# Patient Record
Sex: FEMALE | Race: WHITE | NOT HISPANIC OR LATINO | Employment: OTHER | RURAL
[De-identification: names, ages, dates, MRNs, and addresses within clinical notes are randomized per-mention and may not be internally consistent; named-entity substitution may affect disease eponyms.]

---

## 2021-04-20 ENCOUNTER — HISTORICAL (OUTPATIENT)
Dept: ADMINISTRATIVE | Facility: HOSPITAL | Age: 86
End: 2021-04-20

## 2021-04-20 LAB
BACTERIA #/AREA URNS HPF: ABNORMAL /HPF
BILIRUB UR QL STRIP: NEGATIVE MG/DL
CLARITY UR: CLEAR
CLARITY UR: CLEAR
COLOR UR: YELLOW
COLOR UR: YELLOW
GLUCOSE UR STRIP-MCNC: NORMAL MG/DL
KETONES UR STRIP-SCNC: NEGATIVE MG/DL
LEUKOCYTE ESTERASE UR QL STRIP: ABNORMAL LEU/UL
NITRITE UR QL STRIP: NEGATIVE
PH UR STRIP: 6.5 PH UNITS (ref 5–8)
PROT UR QL STRIP: NEGATIVE MG/DL
RBC # UR STRIP: NEGATIVE ERY/UL
RBC #/AREA URNS HPF: ABNORMAL /HPF (ref 0–3)
SP GR UR STRIP: 1.02 (ref 1–1.03)
UROBILINOGEN UR STRIP-ACNC: 0.2 MG/DL
WBC #/AREA URNS HPF: ABNORMAL /HPF (ref 0–5)

## 2021-06-14 ENCOUNTER — OFFICE VISIT (OUTPATIENT)
Dept: FAMILY MEDICINE | Facility: CLINIC | Age: 86
End: 2021-06-14
Payer: MEDICARE

## 2021-06-14 VITALS
SYSTOLIC BLOOD PRESSURE: 132 MMHG | OXYGEN SATURATION: 96 % | BODY MASS INDEX: 32.65 KG/M2 | HEART RATE: 71 BPM | TEMPERATURE: 97 F | DIASTOLIC BLOOD PRESSURE: 80 MMHG | HEIGHT: 65 IN | WEIGHT: 196 LBS

## 2021-06-14 DIAGNOSIS — G47.09 OTHER INSOMNIA: ICD-10-CM

## 2021-06-14 DIAGNOSIS — R30.0 DYSURIA: Primary | ICD-10-CM

## 2021-06-14 DIAGNOSIS — I10 ESSENTIAL HYPERTENSION: ICD-10-CM

## 2021-06-14 DIAGNOSIS — E78.5 HYPERLIPIDEMIA, UNSPECIFIED HYPERLIPIDEMIA TYPE: ICD-10-CM

## 2021-06-14 DIAGNOSIS — M15.9 OSTEOARTHRITIS OF MULTIPLE JOINTS, UNSPECIFIED OSTEOARTHRITIS TYPE: ICD-10-CM

## 2021-06-14 PROBLEM — M19.90 OSTEOARTHRITIS: Status: ACTIVE | Noted: 2021-06-14

## 2021-06-14 LAB
BACTERIA #/AREA URNS HPF: ABNORMAL /HPF
BILIRUB UR QL STRIP: NEGATIVE
CLARITY UR: ABNORMAL
COLOR UR: YELLOW
GLUCOSE UR STRIP-MCNC: NEGATIVE MG/DL
KETONES UR STRIP-SCNC: NEGATIVE MG/DL
LEUKOCYTE ESTERASE UR QL STRIP: ABNORMAL
NITRITE UR QL STRIP: POSITIVE
PH UR STRIP: 6 PH UNITS
PROT UR QL STRIP: NEGATIVE
RBC # UR STRIP: ABNORMAL /UL
RBC #/AREA URNS HPF: ABNORMAL /HPF
SP GR UR STRIP: 1.01
SQUAMOUS #/AREA URNS LPF: ABNORMAL /LPF
UROBILINOGEN UR STRIP-ACNC: 1 MG/DL
WBC #/AREA URNS HPF: ABNORMAL /HPF
YEAST #/AREA URNS HPF: ABNORMAL /HPF

## 2021-06-14 PROCEDURE — 87077 CULTURE AEROBIC IDENTIFY: CPT | Mod: ,,, | Performed by: CLINICAL MEDICAL LABORATORY

## 2021-06-14 PROCEDURE — 81001 URINALYSIS AUTO W/SCOPE: CPT | Mod: ,,, | Performed by: CLINICAL MEDICAL LABORATORY

## 2021-06-14 PROCEDURE — 87186 CULTURE, URINE: ICD-10-PCS | Mod: ,,, | Performed by: CLINICAL MEDICAL LABORATORY

## 2021-06-14 PROCEDURE — 87086 CULTURE, URINE: ICD-10-PCS | Mod: ,,, | Performed by: CLINICAL MEDICAL LABORATORY

## 2021-06-14 PROCEDURE — 81001 URINALYSIS, REFLEX TO URINE CULTURE: ICD-10-PCS | Mod: ,,, | Performed by: CLINICAL MEDICAL LABORATORY

## 2021-06-14 PROCEDURE — 99213 OFFICE O/P EST LOW 20 MIN: CPT | Mod: ,,, | Performed by: FAMILY MEDICINE

## 2021-06-14 PROCEDURE — 87186 SC STD MICRODIL/AGAR DIL: CPT | Mod: ,,, | Performed by: CLINICAL MEDICAL LABORATORY

## 2021-06-14 PROCEDURE — 87086 URINE CULTURE/COLONY COUNT: CPT | Mod: ,,, | Performed by: CLINICAL MEDICAL LABORATORY

## 2021-06-14 PROCEDURE — 99213 PR OFFICE/OUTPT VISIT, EST, LEVL III, 20-29 MIN: ICD-10-PCS | Mod: ,,, | Performed by: FAMILY MEDICINE

## 2021-06-14 PROCEDURE — 87077 CULTURE, URINE: ICD-10-PCS | Mod: ,,, | Performed by: CLINICAL MEDICAL LABORATORY

## 2021-06-14 RX ORDER — CLONAZEPAM 0.5 MG/1
TABLET ORAL
COMMUNITY
Start: 2021-05-18 | End: 2021-09-20 | Stop reason: SDUPTHER

## 2021-06-14 RX ORDER — VALSARTAN 80 MG/1
80 TABLET ORAL DAILY
COMMUNITY
Start: 2021-05-18 | End: 2021-06-16

## 2021-06-14 RX ORDER — CYCLOSPORINE 0.5 MG/ML
EMULSION OPHTHALMIC
COMMUNITY
Start: 2021-03-29 | End: 2023-01-01

## 2021-06-14 RX ORDER — NEBIVOLOL HYDROCHLORIDE 5 MG/1
5 TABLET ORAL DAILY
COMMUNITY
Start: 2021-06-08 | End: 2021-07-13

## 2021-06-14 RX ORDER — EZETIMIBE 10 MG/1
10 TABLET ORAL DAILY
COMMUNITY
Start: 2021-02-12 | End: 2022-05-23

## 2021-06-14 RX ORDER — HYDROCODONE BITARTRATE AND ACETAMINOPHEN 5; 325 MG/1; MG/1
1 TABLET ORAL
COMMUNITY
Start: 2021-02-19 | End: 2021-07-01 | Stop reason: ALTCHOICE

## 2021-06-14 RX ORDER — MONTELUKAST SODIUM 10 MG/1
10 TABLET ORAL NIGHTLY
COMMUNITY
Start: 2021-06-08 | End: 2021-07-13

## 2021-06-14 RX ORDER — FLUTICASONE PROPIONATE 50 MCG
SPRAY, SUSPENSION (ML) NASAL
COMMUNITY
Start: 2021-05-18 | End: 2021-10-11

## 2021-06-14 RX ORDER — CEFDINIR 300 MG/1
300 CAPSULE ORAL 2 TIMES DAILY
Qty: 20 CAPSULE | Refills: 0 | Status: SHIPPED | OUTPATIENT
Start: 2021-06-14 | End: 2021-06-24

## 2021-06-15 ENCOUNTER — TELEPHONE (OUTPATIENT)
Dept: FAMILY MEDICINE | Facility: CLINIC | Age: 86
End: 2021-06-15

## 2021-06-17 LAB
UA COMPLETE W REFLEX CULTURE PNL UR: ABNORMAL
UA COMPLETE W REFLEX CULTURE PNL UR: ABNORMAL

## 2021-06-17 RX ORDER — CIPROFLOXACIN 500 MG/1
500 TABLET ORAL 2 TIMES DAILY
Qty: 20 TABLET | Refills: 0 | Status: SHIPPED | OUTPATIENT
Start: 2021-06-17 | End: 2021-07-12

## 2021-06-28 DIAGNOSIS — N39.0 URINARY TRACT INFECTION WITHOUT HEMATURIA, SITE UNSPECIFIED: Primary | ICD-10-CM

## 2021-06-30 ENCOUNTER — OFFICE VISIT (OUTPATIENT)
Dept: FAMILY MEDICINE | Facility: CLINIC | Age: 86
End: 2021-06-30
Payer: MEDICARE

## 2021-06-30 VITALS
DIASTOLIC BLOOD PRESSURE: 78 MMHG | WEIGHT: 193.38 LBS | HEART RATE: 79 BPM | OXYGEN SATURATION: 96 % | HEIGHT: 65 IN | SYSTOLIC BLOOD PRESSURE: 150 MMHG | RESPIRATION RATE: 18 BRPM | TEMPERATURE: 97 F | BODY MASS INDEX: 32.22 KG/M2

## 2021-06-30 DIAGNOSIS — B02.9 HERPES ZOSTER WITHOUT COMPLICATION: Primary | ICD-10-CM

## 2021-06-30 DIAGNOSIS — I10 ESSENTIAL HYPERTENSION: ICD-10-CM

## 2021-06-30 DIAGNOSIS — M15.9 OSTEOARTHRITIS OF MULTIPLE JOINTS, UNSPECIFIED OSTEOARTHRITIS TYPE: ICD-10-CM

## 2021-06-30 PROCEDURE — 99213 PR OFFICE/OUTPT VISIT, EST, LEVL III, 20-29 MIN: ICD-10-PCS | Mod: ,,, | Performed by: NURSE PRACTITIONER

## 2021-06-30 PROCEDURE — 99213 OFFICE O/P EST LOW 20 MIN: CPT | Mod: ,,, | Performed by: NURSE PRACTITIONER

## 2021-06-30 RX ORDER — MOMETASONE FUROATE 1 MG/G
CREAM TOPICAL DAILY
Qty: 60 G | Refills: 1 | Status: SHIPPED | OUTPATIENT
Start: 2021-06-30 | End: 2021-07-12 | Stop reason: SDUPTHER

## 2021-06-30 RX ORDER — GABAPENTIN 100 MG/1
100 CAPSULE ORAL 3 TIMES DAILY
Qty: 90 CAPSULE | Refills: 1 | Status: SHIPPED | OUTPATIENT
Start: 2021-06-30 | End: 2022-01-18 | Stop reason: SDUPTHER

## 2021-06-30 RX ORDER — PREDNISONE 10 MG/1
10 TABLET ORAL DAILY
Qty: 40 TABLET | Refills: 0 | Status: SHIPPED | OUTPATIENT
Start: 2021-06-30 | End: 2021-07-12

## 2021-06-30 RX ORDER — VALACYCLOVIR HYDROCHLORIDE 1 G/1
1000 TABLET, FILM COATED ORAL 2 TIMES DAILY
Qty: 60 TABLET | Refills: 11 | Status: SHIPPED | OUTPATIENT
Start: 2021-06-30 | End: 2021-09-20

## 2021-07-01 RX ORDER — HYDROCODONE BITARTRATE AND ACETAMINOPHEN 5; 325 MG/1; MG/1
1 TABLET ORAL EVERY 8 HOURS PRN
Qty: 21 TABLET | Refills: 0 | Status: SHIPPED | OUTPATIENT
Start: 2021-07-01 | End: 2021-07-08

## 2021-07-12 ENCOUNTER — OFFICE VISIT (OUTPATIENT)
Dept: FAMILY MEDICINE | Facility: CLINIC | Age: 86
End: 2021-07-12
Payer: MEDICARE

## 2021-07-12 VITALS
BODY MASS INDEX: 32.12 KG/M2 | WEIGHT: 192.81 LBS | TEMPERATURE: 98 F | HEIGHT: 65 IN | HEART RATE: 87 BPM | OXYGEN SATURATION: 96 % | SYSTOLIC BLOOD PRESSURE: 137 MMHG | DIASTOLIC BLOOD PRESSURE: 89 MMHG

## 2021-07-12 DIAGNOSIS — M15.9 OSTEOARTHRITIS OF MULTIPLE JOINTS, UNSPECIFIED OSTEOARTHRITIS TYPE: ICD-10-CM

## 2021-07-12 DIAGNOSIS — I10 ESSENTIAL HYPERTENSION: ICD-10-CM

## 2021-07-12 DIAGNOSIS — B02.9 HERPES ZOSTER WITHOUT COMPLICATION: ICD-10-CM

## 2021-07-12 PROCEDURE — 99213 PR OFFICE/OUTPT VISIT, EST, LEVL III, 20-29 MIN: ICD-10-PCS | Mod: ,,, | Performed by: FAMILY MEDICINE

## 2021-07-12 PROCEDURE — 99213 OFFICE O/P EST LOW 20 MIN: CPT | Mod: ,,, | Performed by: FAMILY MEDICINE

## 2021-07-12 RX ORDER — MOMETASONE FUROATE 1 MG/G
CREAM TOPICAL
Qty: 60 G | Refills: 2 | Status: SHIPPED | OUTPATIENT
Start: 2021-07-12 | End: 2022-01-18

## 2021-07-12 RX ORDER — HYDROCODONE BITARTRATE AND ACETAMINOPHEN 5; 325 MG/1; MG/1
TABLET ORAL
Qty: 20 TABLET | Refills: 0 | Status: SHIPPED | OUTPATIENT
Start: 2021-07-12 | End: 2021-09-20

## 2021-07-12 RX ORDER — CAMPHOR 0.45 %
GEL (GRAM) TOPICAL
Qty: 118 ML | Refills: 2 | Status: SHIPPED | OUTPATIENT
Start: 2021-07-12 | End: 2022-01-18

## 2021-08-09 ENCOUNTER — OFFICE VISIT (OUTPATIENT)
Dept: FAMILY MEDICINE | Facility: CLINIC | Age: 86
End: 2021-08-09
Payer: MEDICARE

## 2021-08-09 DIAGNOSIS — M15.9 OSTEOARTHRITIS OF MULTIPLE JOINTS, UNSPECIFIED OSTEOARTHRITIS TYPE: ICD-10-CM
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
## 2021-08-09 DIAGNOSIS — R53.1 WEAKNESS: ICD-10-CM

## 2021-08-09 DIAGNOSIS — I10 ESSENTIAL HYPERTENSION: ICD-10-CM

## 2021-08-09 DIAGNOSIS — B02.9 HERPES ZOSTER WITHOUT COMPLICATION: Primary | ICD-10-CM

## 2021-08-09 PROCEDURE — 99442 PR PHYSICIAN TELEPHONE EVALUATION 11-20 MIN: ICD-10-PCS | Mod: 95,,, | Performed by: FAMILY MEDICINE

## 2021-08-09 PROCEDURE — 99442 PR PHYSICIAN TELEPHONE EVALUATION 11-20 MIN: CPT | Mod: 95,,, | Performed by: FAMILY MEDICINE

## 2021-09-20 ENCOUNTER — OFFICE VISIT (OUTPATIENT)
Dept: FAMILY MEDICINE | Facility: CLINIC | Age: 86
End: 2021-09-20
Payer: MEDICARE

## 2021-09-20 VITALS
WEIGHT: 188.63 LBS | DIASTOLIC BLOOD PRESSURE: 76 MMHG | OXYGEN SATURATION: 96 % | SYSTOLIC BLOOD PRESSURE: 132 MMHG | HEIGHT: 65 IN | HEART RATE: 101 BPM | BODY MASS INDEX: 31.43 KG/M2 | TEMPERATURE: 97 F

## 2021-09-20 DIAGNOSIS — I10 ESSENTIAL HYPERTENSION: ICD-10-CM

## 2021-09-20 DIAGNOSIS — M15.9 OSTEOARTHRITIS OF MULTIPLE JOINTS, UNSPECIFIED OSTEOARTHRITIS TYPE: ICD-10-CM

## 2021-09-20 DIAGNOSIS — E78.5 HYPERLIPIDEMIA, UNSPECIFIED HYPERLIPIDEMIA TYPE: ICD-10-CM

## 2021-09-20 DIAGNOSIS — G47.09 OTHER INSOMNIA: ICD-10-CM

## 2021-09-20 DIAGNOSIS — E55.9 VITAMIN D DEFICIENCY: ICD-10-CM

## 2021-09-20 DIAGNOSIS — R30.0 DYSURIA: Primary | ICD-10-CM

## 2021-09-20 LAB
25(OH)D3 SERPL-MCNC: 22.8 NG/ML
ALBUMIN SERPL BCP-MCNC: 3.8 G/DL (ref 3.5–5)
ALBUMIN/GLOB SERPL: 1 {RATIO}
ALP SERPL-CCNC: 84 U/L (ref 55–142)
ALT SERPL W P-5'-P-CCNC: 71 U/L (ref 13–56)
ANION GAP SERPL CALCULATED.3IONS-SCNC: 13 MMOL/L (ref 7–16)
AST SERPL W P-5'-P-CCNC: 56 U/L (ref 15–37)
BACTERIA #/AREA URNS HPF: ABNORMAL /HPF
BASOPHILS # BLD AUTO: 0.04 K/UL (ref 0–0.2)
BASOPHILS NFR BLD AUTO: 0.5 % (ref 0–1)
BILIRUB SERPL-MCNC: 2 MG/DL (ref 0–1.2)
BILIRUB UR QL STRIP: NEGATIVE
BUN SERPL-MCNC: 17 MG/DL (ref 7–18)
BUN/CREAT SERPL: 18 (ref 6–20)
CALCIUM SERPL-MCNC: 9.5 MG/DL (ref 8.5–10.1)
CAOX CRY #/AREA URNS LPF: ABNORMAL /LPF
CHLORIDE SERPL-SCNC: 101 MMOL/L (ref 98–107)
CHOLEST SERPL-MCNC: 265 MG/DL (ref 0–200)
CHOLEST/HDLC SERPL: 3.7 {RATIO}
CLARITY UR: CLEAR
CO2 SERPL-SCNC: 27 MMOL/L (ref 21–32)
COLOR UR: YELLOW
CREAT SERPL-MCNC: 0.97 MG/DL (ref 0.55–1.02)
DIFFERENTIAL METHOD BLD: ABNORMAL
EOSINOPHIL # BLD AUTO: 0 K/UL (ref 0–0.5)
EOSINOPHIL NFR BLD AUTO: 0 % (ref 1–4)
ERYTHROCYTE [DISTWIDTH] IN BLOOD BY AUTOMATED COUNT: 13.8 % (ref 11.5–14.5)
GLOBULIN SER-MCNC: 3.8 G/DL (ref 2–4)
GLUCOSE SERPL-MCNC: 102 MG/DL (ref 74–106)
GLUCOSE UR STRIP-MCNC: NEGATIVE MG/DL
HCT VFR BLD AUTO: 48.4 % (ref 38–47)
HDLC SERPL-MCNC: 71 MG/DL (ref 40–60)
HGB BLD-MCNC: 17.4 G/DL (ref 12–16)
IMM GRANULOCYTES # BLD AUTO: 0.03 K/UL (ref 0–0.04)
IMM GRANULOCYTES NFR BLD: 0.3 % (ref 0–0.4)
KETONES UR STRIP-SCNC: NEGATIVE MG/DL
LDLC SERPL CALC-MCNC: 167 MG/DL
LDLC/HDLC SERPL: 2.4 {RATIO}
LEUKOCYTE ESTERASE UR QL STRIP: ABNORMAL
LYMPHOCYTES # BLD AUTO: 3.05 K/UL (ref 1–4.8)
LYMPHOCYTES NFR BLD AUTO: 35.5 % (ref 27–41)
MCH RBC QN AUTO: 37.6 PG (ref 27–31)
MCHC RBC AUTO-ENTMCNC: 36 G/DL (ref 32–36)
MCV RBC AUTO: 104.5 FL (ref 80–96)
MONOCYTES # BLD AUTO: 1.07 K/UL (ref 0–0.8)
MONOCYTES NFR BLD AUTO: 12.5 % (ref 2–6)
MPC BLD CALC-MCNC: 10.5 FL (ref 9.4–12.4)
NEUTROPHILS # BLD AUTO: 4.4 K/UL (ref 1.8–7.7)
NEUTROPHILS NFR BLD AUTO: 51.2 % (ref 53–65)
NITRITE UR QL STRIP: NEGATIVE
NONHDLC SERPL-MCNC: 194 MG/DL
NRBC # BLD AUTO: 0 X10E3/UL
NRBC, AUTO (.00): 0 %
PH UR STRIP: 6 PH UNITS
PLATELET # BLD AUTO: 173 K/UL (ref 150–400)
POTASSIUM SERPL-SCNC: 3.9 MMOL/L (ref 3.5–5.1)
PROT SERPL-MCNC: 7.6 G/DL (ref 6.4–8.2)
PROT UR QL STRIP: NEGATIVE
RBC # BLD AUTO: 4.63 M/UL (ref 4.2–5.4)
RBC # UR STRIP: NEGATIVE /UL
RBC #/AREA URNS HPF: ABNORMAL /HPF
SODIUM SERPL-SCNC: 137 MMOL/L (ref 136–145)
SP GR UR STRIP: 1.02
SQUAMOUS #/AREA URNS LPF: ABNORMAL /LPF
TRIGL SERPL-MCNC: 133 MG/DL (ref 35–150)
UROBILINOGEN UR STRIP-ACNC: 1 MG/DL
VLDLC SERPL-MCNC: 27 MG/DL
WBC # BLD AUTO: 8.59 K/UL (ref 4.5–11)
WBC #/AREA URNS HPF: ABNORMAL /HPF

## 2021-09-20 PROCEDURE — 80061 LIPID PANEL: CPT | Mod: ,,, | Performed by: CLINICAL MEDICAL LABORATORY

## 2021-09-20 PROCEDURE — 80053 COMPREHEN METABOLIC PANEL: CPT | Mod: ,,, | Performed by: CLINICAL MEDICAL LABORATORY

## 2021-09-20 PROCEDURE — 80061 LIPID PANEL: ICD-10-PCS | Mod: ,,, | Performed by: CLINICAL MEDICAL LABORATORY

## 2021-09-20 PROCEDURE — 81001 URINALYSIS AUTO W/SCOPE: CPT | Mod: ,,, | Performed by: CLINICAL MEDICAL LABORATORY

## 2021-09-20 PROCEDURE — 99214 PR OFFICE/OUTPT VISIT, EST, LEVL IV, 30-39 MIN: ICD-10-PCS | Mod: ,,, | Performed by: FAMILY MEDICINE

## 2021-09-20 PROCEDURE — 80053 COMPREHENSIVE METABOLIC PANEL: ICD-10-PCS | Mod: ,,, | Performed by: CLINICAL MEDICAL LABORATORY

## 2021-09-20 PROCEDURE — 85025 CBC WITH DIFFERENTIAL: ICD-10-PCS | Mod: ,,, | Performed by: CLINICAL MEDICAL LABORATORY

## 2021-09-20 PROCEDURE — 99214 OFFICE O/P EST MOD 30 MIN: CPT | Mod: ,,, | Performed by: FAMILY MEDICINE

## 2021-09-20 PROCEDURE — 81001 URINALYSIS, REFLEX TO URINE CULTURE: ICD-10-PCS | Mod: ,,, | Performed by: CLINICAL MEDICAL LABORATORY

## 2021-09-20 PROCEDURE — 85025 COMPLETE CBC W/AUTO DIFF WBC: CPT | Mod: ,,, | Performed by: CLINICAL MEDICAL LABORATORY

## 2021-09-20 PROCEDURE — 82306 VITAMIN D 25 HYDROXY: CPT | Mod: ,,, | Performed by: CLINICAL MEDICAL LABORATORY

## 2021-09-20 PROCEDURE — 82306 VITAMIN D: ICD-10-PCS | Mod: ,,, | Performed by: CLINICAL MEDICAL LABORATORY

## 2021-09-20 RX ORDER — CLONAZEPAM 0.5 MG/1
TABLET ORAL
Qty: 30 TABLET | Refills: 5 | Status: SHIPPED | OUTPATIENT
Start: 2021-09-20 | End: 2022-06-16 | Stop reason: ALTCHOICE

## 2021-09-20 RX ORDER — NEBIVOLOL 5 MG/1
5 TABLET ORAL DAILY
Qty: 90 TABLET | Refills: 2 | Status: SHIPPED | OUTPATIENT
Start: 2021-09-20 | End: 2022-06-16 | Stop reason: ALTCHOICE

## 2021-09-20 RX ORDER — VALSARTAN 80 MG/1
80 TABLET ORAL DAILY
Qty: 90 TABLET | Refills: 2 | Status: SHIPPED | OUTPATIENT
Start: 2021-09-20 | End: 2022-05-23 | Stop reason: SDUPTHER

## 2021-09-20 RX ORDER — CEFDINIR 300 MG/1
300 CAPSULE ORAL 2 TIMES DAILY
Qty: 20 CAPSULE | Refills: 0 | Status: SHIPPED | OUTPATIENT
Start: 2021-09-20 | End: 2021-09-30

## 2021-10-07 ENCOUNTER — TELEPHONE (OUTPATIENT)
Dept: FAMILY MEDICINE | Facility: CLINIC | Age: 86
End: 2021-10-07

## 2021-10-20 ENCOUNTER — TELEPHONE (OUTPATIENT)
Dept: FAMILY MEDICINE | Facility: CLINIC | Age: 86
End: 2021-10-20
Payer: MEDICARE

## 2021-10-20 DIAGNOSIS — Z12.31 ENCOUNTER FOR SCREENING MAMMOGRAM FOR MALIGNANT NEOPLASM OF BREAST: Primary | ICD-10-CM

## 2021-10-25 ENCOUNTER — APPOINTMENT (OUTPATIENT)
Dept: RADIOLOGY | Facility: CLINIC | Age: 86
End: 2021-10-25
Attending: FAMILY MEDICINE
Payer: MEDICARE

## 2021-10-25 ENCOUNTER — OFFICE VISIT (OUTPATIENT)
Dept: FAMILY MEDICINE | Facility: CLINIC | Age: 86
End: 2021-10-25
Payer: MEDICARE

## 2021-10-25 VITALS
HEART RATE: 89 BPM | WEIGHT: 187 LBS | DIASTOLIC BLOOD PRESSURE: 62 MMHG | SYSTOLIC BLOOD PRESSURE: 98 MMHG | TEMPERATURE: 97 F | BODY MASS INDEX: 31.12 KG/M2 | OXYGEN SATURATION: 96 %

## 2021-10-25 DIAGNOSIS — M54.50 CHRONIC LOW BACK PAIN WITHOUT SCIATICA, UNSPECIFIED BACK PAIN LATERALITY: ICD-10-CM

## 2021-10-25 DIAGNOSIS — I10 ESSENTIAL HYPERTENSION: ICD-10-CM

## 2021-10-25 DIAGNOSIS — N39.0 URINARY TRACT INFECTION WITHOUT HEMATURIA, SITE UNSPECIFIED: ICD-10-CM

## 2021-10-25 DIAGNOSIS — G89.29 CHRONIC LOW BACK PAIN WITHOUT SCIATICA, UNSPECIFIED BACK PAIN LATERALITY: ICD-10-CM

## 2021-10-25 DIAGNOSIS — R63.4 WEIGHT LOSS: ICD-10-CM

## 2021-10-25 DIAGNOSIS — R53.1 WEAKNESS: ICD-10-CM

## 2021-10-25 DIAGNOSIS — M15.9 OSTEOARTHRITIS OF MULTIPLE JOINTS, UNSPECIFIED OSTEOARTHRITIS TYPE: ICD-10-CM

## 2021-10-25 DIAGNOSIS — R30.0 DYSURIA: Primary | ICD-10-CM

## 2021-10-25 LAB
BACTERIA #/AREA URNS HPF: ABNORMAL /HPF
BILIRUB UR QL STRIP: NEGATIVE
CLARITY UR: ABNORMAL
COLOR UR: YELLOW
GLUCOSE UR STRIP-MCNC: NEGATIVE MG/DL
KETONES UR STRIP-SCNC: NEGATIVE MG/DL
LEUKOCYTE ESTERASE UR QL STRIP: ABNORMAL
MUCOUS THREADS #/AREA URNS HPF: ABNORMAL /HPF
NITRITE UR QL STRIP: NEGATIVE
PH UR STRIP: 6 PH UNITS
PROT UR QL STRIP: 30
RBC # UR STRIP: ABNORMAL /UL
RBC #/AREA URNS HPF: ABNORMAL /HPF
SP GR UR STRIP: 1.02
SQUAMOUS #/AREA URNS LPF: ABNORMAL /LPF
UROBILINOGEN UR STRIP-ACNC: 4 MG/DL
WBC #/AREA URNS HPF: ABNORMAL /HPF

## 2021-10-25 PROCEDURE — 81001 URINALYSIS AUTO W/SCOPE: CPT | Mod: ,,, | Performed by: CLINICAL MEDICAL LABORATORY

## 2021-10-25 PROCEDURE — 87077 CULTURE AEROBIC IDENTIFY: CPT | Mod: ,,, | Performed by: CLINICAL MEDICAL LABORATORY

## 2021-10-25 PROCEDURE — 87086 URINE CULTURE/COLONY COUNT: CPT | Mod: ,,, | Performed by: CLINICAL MEDICAL LABORATORY

## 2021-10-25 PROCEDURE — 87077 CULTURE, URINE: ICD-10-PCS | Mod: ,,, | Performed by: CLINICAL MEDICAL LABORATORY

## 2021-10-25 PROCEDURE — 81001 URINALYSIS, REFLEX TO URINE CULTURE: ICD-10-PCS | Mod: ,,, | Performed by: CLINICAL MEDICAL LABORATORY

## 2021-10-25 PROCEDURE — 87186 SC STD MICRODIL/AGAR DIL: CPT | Mod: ,,, | Performed by: CLINICAL MEDICAL LABORATORY

## 2021-10-25 PROCEDURE — 87186 CULTURE, URINE: ICD-10-PCS | Mod: ,,, | Performed by: CLINICAL MEDICAL LABORATORY

## 2021-10-25 PROCEDURE — 99214 OFFICE O/P EST MOD 30 MIN: CPT | Mod: 25,,, | Performed by: FAMILY MEDICINE

## 2021-10-25 PROCEDURE — 99214 PR OFFICE/OUTPT VISIT, EST, LEVL IV, 30-39 MIN: ICD-10-PCS | Mod: 25,,, | Performed by: FAMILY MEDICINE

## 2021-10-25 PROCEDURE — 96372 PR INJECTION,THERAP/PROPH/DIAG2ST, IM OR SUBCUT: ICD-10-PCS | Mod: ,,, | Performed by: FAMILY MEDICINE

## 2021-10-25 PROCEDURE — 96372 THER/PROPH/DIAG INJ SC/IM: CPT | Mod: ,,, | Performed by: FAMILY MEDICINE

## 2021-10-25 PROCEDURE — 87086 CULTURE, URINE: ICD-10-PCS | Mod: ,,, | Performed by: CLINICAL MEDICAL LABORATORY

## 2021-10-25 PROCEDURE — 71046 X-RAY EXAM CHEST 2 VIEWS: CPT | Mod: TC,RHCUB,FY | Performed by: FAMILY MEDICINE

## 2021-10-25 RX ORDER — CEFTRIAXONE 1 G/1
1 INJECTION, POWDER, FOR SOLUTION INTRAMUSCULAR; INTRAVENOUS
Status: COMPLETED | OUTPATIENT
Start: 2021-10-25 | End: 2021-10-25

## 2021-10-25 RX ORDER — CIPROFLOXACIN 500 MG/1
500 TABLET ORAL 2 TIMES DAILY
Qty: 14 TABLET | Refills: 0 | Status: SHIPPED | OUTPATIENT
Start: 2021-10-25 | End: 2021-11-09

## 2021-10-25 RX ADMIN — CEFTRIAXONE 1 G: 1 INJECTION, POWDER, FOR SOLUTION INTRAMUSCULAR; INTRAVENOUS at 10:10

## 2021-10-26 ENCOUNTER — CLINICAL SUPPORT (OUTPATIENT)
Dept: FAMILY MEDICINE | Facility: CLINIC | Age: 86
End: 2021-10-26
Payer: MEDICARE

## 2021-10-26 DIAGNOSIS — N39.0 URINARY TRACT INFECTION WITHOUT HEMATURIA, SITE UNSPECIFIED: Primary | ICD-10-CM

## 2021-10-26 PROCEDURE — 96372 THER/PROPH/DIAG INJ SC/IM: CPT | Mod: ,,, | Performed by: FAMILY MEDICINE

## 2021-10-26 PROCEDURE — 96372 PR INJECTION,THERAP/PROPH/DIAG2ST, IM OR SUBCUT: ICD-10-PCS | Mod: ,,, | Performed by: FAMILY MEDICINE

## 2021-10-26 RX ORDER — CEFTRIAXONE 1 G/1
1 INJECTION, POWDER, FOR SOLUTION INTRAMUSCULAR; INTRAVENOUS
Status: COMPLETED | OUTPATIENT
Start: 2021-10-26 | End: 2021-10-26

## 2021-10-26 RX ORDER — VALACYCLOVIR HYDROCHLORIDE 1 G/1
1000 TABLET, FILM COATED ORAL 2 TIMES DAILY
COMMUNITY
End: 2023-01-01

## 2021-10-26 RX ADMIN — CEFTRIAXONE 1 G: 1 INJECTION, POWDER, FOR SOLUTION INTRAMUSCULAR; INTRAVENOUS at 10:10

## 2021-10-28 ENCOUNTER — TELEPHONE (OUTPATIENT)
Dept: FAMILY MEDICINE | Facility: CLINIC | Age: 86
End: 2021-10-28
Payer: MEDICARE

## 2021-10-28 LAB — UA COMPLETE W REFLEX CULTURE PNL UR: ABNORMAL

## 2021-10-28 RX ORDER — NITROFURANTOIN 25; 75 MG/1; MG/1
100 CAPSULE ORAL 2 TIMES DAILY
Qty: 20 CAPSULE | Refills: 0 | Status: SHIPPED | OUTPATIENT
Start: 2021-10-28 | End: 2021-11-09 | Stop reason: SDUPTHER

## 2021-11-08 DIAGNOSIS — N39.0 URINARY TRACT INFECTION WITHOUT HEMATURIA, SITE UNSPECIFIED: Primary | ICD-10-CM

## 2021-11-09 RX ORDER — NITROFURANTOIN 25; 75 MG/1; MG/1
100 CAPSULE ORAL 2 TIMES DAILY
Qty: 14 CAPSULE | Refills: 0 | Status: SHIPPED | OUTPATIENT
Start: 2021-11-09 | End: 2022-01-18

## 2021-11-29 DIAGNOSIS — N39.0 URINARY TRACT INFECTION WITHOUT HEMATURIA, SITE UNSPECIFIED: Primary | ICD-10-CM

## 2021-11-30 ENCOUNTER — TELEPHONE (OUTPATIENT)
Dept: FAMILY MEDICINE | Facility: CLINIC | Age: 86
End: 2021-11-30
Payer: MEDICARE

## 2022-01-01 ENCOUNTER — OFFICE VISIT (OUTPATIENT)
Dept: FAMILY MEDICINE | Facility: CLINIC | Age: 87
End: 2022-01-01
Payer: MEDICARE

## 2022-01-01 ENCOUNTER — DOCUMENTATION ONLY (OUTPATIENT)
Dept: FAMILY MEDICINE | Facility: CLINIC | Age: 87
End: 2022-01-01
Payer: MEDICARE

## 2022-01-01 VITALS
WEIGHT: 175 LBS | DIASTOLIC BLOOD PRESSURE: 75 MMHG | SYSTOLIC BLOOD PRESSURE: 143 MMHG | HEART RATE: 78 BPM | OXYGEN SATURATION: 95 % | BODY MASS INDEX: 29.16 KG/M2 | TEMPERATURE: 98 F | HEIGHT: 65 IN

## 2022-01-01 VITALS
HEART RATE: 110 BPM | SYSTOLIC BLOOD PRESSURE: 155 MMHG | TEMPERATURE: 98 F | BODY MASS INDEX: 29.99 KG/M2 | WEIGHT: 180 LBS | HEIGHT: 65 IN | OXYGEN SATURATION: 95 % | DIASTOLIC BLOOD PRESSURE: 95 MMHG

## 2022-01-01 VITALS
OXYGEN SATURATION: 95 % | BODY MASS INDEX: 30.16 KG/M2 | WEIGHT: 181 LBS | TEMPERATURE: 97 F | DIASTOLIC BLOOD PRESSURE: 70 MMHG | HEIGHT: 65 IN | SYSTOLIC BLOOD PRESSURE: 123 MMHG | HEART RATE: 79 BPM

## 2022-01-01 DIAGNOSIS — N39.0 URINARY TRACT INFECTION WITHOUT HEMATURIA, SITE UNSPECIFIED: Primary | ICD-10-CM

## 2022-01-01 DIAGNOSIS — I10 ESSENTIAL HYPERTENSION: ICD-10-CM

## 2022-01-01 DIAGNOSIS — R82.71 ASYMPTOMATIC BACTERIURIA: ICD-10-CM

## 2022-01-01 DIAGNOSIS — R30.0 DYSURIA: Primary | ICD-10-CM

## 2022-01-01 DIAGNOSIS — F32.A DEPRESSION, UNSPECIFIED DEPRESSION TYPE: Primary | ICD-10-CM

## 2022-01-01 LAB
BACTERIA #/AREA URNS HPF: ABNORMAL /HPF
BILIRUB SERPL-MCNC: NEGATIVE MG/DL
BILIRUB SERPL-MCNC: NEGATIVE MG/DL
BILIRUB UR QL STRIP: NEGATIVE
BLOOD URINE, POC: ABNORMAL
BLOOD URINE, POC: NEGATIVE
CLARITY UR: CLEAR
COLOR UR: YELLOW
COLOR, POC UA: YELLOW
COLOR, POC UA: YELLOW
GLUCOSE UR QL STRIP: NEGATIVE
GLUCOSE UR QL STRIP: NEGATIVE
GLUCOSE UR STRIP-MCNC: NORMAL MG/DL
KETONES UR QL STRIP: NEGATIVE
KETONES UR QL STRIP: NEGATIVE
KETONES UR STRIP-SCNC: NEGATIVE MG/DL
LEUKOCYTE ESTERASE UR QL STRIP: ABNORMAL
LEUKOCYTE ESTERASE URINE, POC: ABNORMAL
LEUKOCYTE ESTERASE URINE, POC: NORMAL
MUCOUS, UA: ABNORMAL /LPF
NITRITE UR QL STRIP: NEGATIVE
NITRITE, POC UA: NEGATIVE
NITRITE, POC UA: NEGATIVE
PH UR STRIP: 6 PH UNITS
PH, POC UA: 6
PROT UR QL STRIP: NEGATIVE
PROTEIN, POC: ABNORMAL
PROTEIN, POC: NEGATIVE
RBC # UR STRIP: NEGATIVE /UL
RBC #/AREA URNS HPF: <1 /HPF
SP GR UR STRIP: 1.01
SPECIFIC GRAVITY, POC UA: 1.01
SPECIFIC GRAVITY, POC UA: 1.01
SQUAMOUS #/AREA URNS LPF: ABNORMAL /HPF
UA COMPLETE W REFLEX CULTURE PNL UR: NORMAL
UROBILINOGEN UR STRIP-ACNC: NORMAL MG/DL
UROBILINOGEN, POC UA: 1
UROBILINOGEN, POC UA: 1
WBC #/AREA URNS HPF: 20 /HPF

## 2022-01-01 PROCEDURE — 99215 PR OFFICE/OUTPT VISIT, EST, LEVL V, 40-54 MIN: ICD-10-PCS | Mod: ,,, | Performed by: STUDENT IN AN ORGANIZED HEALTH CARE EDUCATION/TRAINING PROGRAM

## 2022-01-01 PROCEDURE — 81003 URINALYSIS AUTO W/O SCOPE: CPT | Mod: RHCUB | Performed by: NURSE PRACTITIONER

## 2022-01-01 PROCEDURE — 81001 URINALYSIS AUTO W/SCOPE: CPT | Mod: ,,, | Performed by: CLINICAL MEDICAL LABORATORY

## 2022-01-01 PROCEDURE — 81001 URINALYSIS: ICD-10-PCS | Mod: ,,, | Performed by: CLINICAL MEDICAL LABORATORY

## 2022-01-01 PROCEDURE — 87086 CULTURE, URINE: ICD-10-PCS | Mod: ,,, | Performed by: CLINICAL MEDICAL LABORATORY

## 2022-01-01 PROCEDURE — 99213 PR OFFICE/OUTPT VISIT, EST, LEVL III, 20-29 MIN: ICD-10-PCS | Mod: ,,, | Performed by: NURSE PRACTITIONER

## 2022-01-01 PROCEDURE — 87086 URINE CULTURE/COLONY COUNT: CPT | Mod: ,,, | Performed by: CLINICAL MEDICAL LABORATORY

## 2022-01-01 PROCEDURE — 99213 OFFICE O/P EST LOW 20 MIN: CPT | Mod: ,,, | Performed by: STUDENT IN AN ORGANIZED HEALTH CARE EDUCATION/TRAINING PROGRAM

## 2022-01-01 PROCEDURE — 99213 PR OFFICE/OUTPT VISIT, EST, LEVL III, 20-29 MIN: ICD-10-PCS | Mod: ,,, | Performed by: STUDENT IN AN ORGANIZED HEALTH CARE EDUCATION/TRAINING PROGRAM

## 2022-01-01 PROCEDURE — 81003 URINALYSIS AUTO W/O SCOPE: CPT | Mod: RHCUB | Performed by: STUDENT IN AN ORGANIZED HEALTH CARE EDUCATION/TRAINING PROGRAM

## 2022-01-01 PROCEDURE — 99215 OFFICE O/P EST HI 40 MIN: CPT | Mod: ,,, | Performed by: STUDENT IN AN ORGANIZED HEALTH CARE EDUCATION/TRAINING PROGRAM

## 2022-01-01 PROCEDURE — 99213 OFFICE O/P EST LOW 20 MIN: CPT | Mod: ,,, | Performed by: NURSE PRACTITIONER

## 2022-01-01 RX ORDER — CARVEDILOL 12.5 MG/1
12.5 TABLET ORAL 2 TIMES DAILY
Qty: 180 TABLET | Refills: 0 | Status: SHIPPED | OUTPATIENT
Start: 2022-01-01 | End: 2023-01-01

## 2022-01-01 RX ORDER — CLONAZEPAM 0.5 MG/1
0.5 TABLET ORAL
COMMUNITY
End: 2023-01-01

## 2022-01-01 RX ORDER — FLUTICASONE PROPIONATE 50 MCG
SPRAY, SUSPENSION (ML) NASAL
Qty: 48 G | Refills: 3 | Status: SHIPPED | OUTPATIENT
Start: 2022-01-01 | End: 2023-01-01

## 2022-01-01 RX ORDER — NITROFURANTOIN 25; 75 MG/1; MG/1
100 CAPSULE ORAL 2 TIMES DAILY
Qty: 14 CAPSULE | Refills: 0 | Status: SHIPPED | OUTPATIENT
Start: 2022-01-01 | End: 2022-01-01

## 2022-01-18 ENCOUNTER — OFFICE VISIT (OUTPATIENT)
Dept: FAMILY MEDICINE | Facility: CLINIC | Age: 87
End: 2022-01-18
Payer: MEDICARE

## 2022-01-18 ENCOUNTER — TELEPHONE (OUTPATIENT)
Dept: FAMILY MEDICINE | Facility: CLINIC | Age: 87
End: 2022-01-18
Payer: MEDICARE

## 2022-01-18 VITALS
OXYGEN SATURATION: 99 % | TEMPERATURE: 97 F | DIASTOLIC BLOOD PRESSURE: 74 MMHG | HEIGHT: 65 IN | SYSTOLIC BLOOD PRESSURE: 124 MMHG | HEART RATE: 72 BPM | RESPIRATION RATE: 20 BRPM | WEIGHT: 189 LBS | BODY MASS INDEX: 31.49 KG/M2

## 2022-01-18 DIAGNOSIS — I10 ESSENTIAL HYPERTENSION: ICD-10-CM

## 2022-01-18 DIAGNOSIS — B02.9 HERPES ZOSTER WITHOUT COMPLICATION: ICD-10-CM

## 2022-01-18 DIAGNOSIS — M15.9 OSTEOARTHRITIS OF MULTIPLE JOINTS, UNSPECIFIED OSTEOARTHRITIS TYPE: ICD-10-CM

## 2022-01-18 DIAGNOSIS — E55.9 VITAMIN D DEFICIENCY: ICD-10-CM

## 2022-01-18 DIAGNOSIS — E78.5 HYPERLIPIDEMIA, UNSPECIFIED HYPERLIPIDEMIA TYPE: ICD-10-CM

## 2022-01-18 DIAGNOSIS — Z00.00 ENCOUNTER FOR SUBSEQUENT ANNUAL WELLNESS VISIT (AWV) IN MEDICARE PATIENT: Primary | ICD-10-CM

## 2022-01-18 LAB
25(OH)D3 SERPL-MCNC: 70.5 NG/ML
ALBUMIN SERPL BCP-MCNC: 3.2 G/DL (ref 3.5–5)
ALBUMIN/GLOB SERPL: 0.9 {RATIO}
ALP SERPL-CCNC: 69 U/L (ref 55–142)
ALT SERPL W P-5'-P-CCNC: 30 U/L (ref 13–56)
ANION GAP SERPL CALCULATED.3IONS-SCNC: 5 MMOL/L (ref 7–16)
AST SERPL W P-5'-P-CCNC: 27 U/L (ref 15–37)
BACTERIA #/AREA URNS HPF: ABNORMAL /HPF
BASOPHILS # BLD AUTO: 0.02 K/UL (ref 0–0.2)
BASOPHILS NFR BLD AUTO: 0.3 % (ref 0–1)
BILIRUB SERPL-MCNC: 1.8 MG/DL (ref 0–1.2)
BILIRUB UR QL STRIP: NEGATIVE
BUN SERPL-MCNC: 11 MG/DL (ref 7–18)
BUN/CREAT SERPL: 13 (ref 6–20)
CALCIUM SERPL-MCNC: 9.1 MG/DL (ref 8.5–10.1)
CHLORIDE SERPL-SCNC: 103 MMOL/L (ref 98–107)
CHOLEST SERPL-MCNC: 204 MG/DL (ref 0–200)
CHOLEST/HDLC SERPL: 3.6 {RATIO}
CLARITY UR: CLEAR
CO2 SERPL-SCNC: 34 MMOL/L (ref 21–32)
COLOR UR: YELLOW
CREAT SERPL-MCNC: 0.83 MG/DL (ref 0.55–1.02)
DIFFERENTIAL METHOD BLD: ABNORMAL
EOSINOPHIL # BLD AUTO: 0 K/UL (ref 0–0.5)
EOSINOPHIL NFR BLD AUTO: 0 % (ref 1–4)
ERYTHROCYTE [DISTWIDTH] IN BLOOD BY AUTOMATED COUNT: 11.9 % (ref 11.5–14.5)
GLOBULIN SER-MCNC: 3.7 G/DL (ref 2–4)
GLUCOSE SERPL-MCNC: 98 MG/DL (ref 74–106)
GLUCOSE UR STRIP-MCNC: NEGATIVE MG/DL
HCT VFR BLD AUTO: 47.2 % (ref 38–47)
HDLC SERPL-MCNC: 57 MG/DL (ref 40–60)
HGB BLD-MCNC: 16.7 G/DL (ref 12–16)
IMM GRANULOCYTES # BLD AUTO: 0.01 K/UL (ref 0–0.04)
IMM GRANULOCYTES NFR BLD: 0.2 % (ref 0–0.4)
KETONES UR STRIP-SCNC: NEGATIVE MG/DL
LDLC SERPL CALC-MCNC: 124 MG/DL
LDLC/HDLC SERPL: 2.2 {RATIO}
LEUKOCYTE ESTERASE UR QL STRIP: ABNORMAL
LYMPHOCYTES # BLD AUTO: 1.91 K/UL (ref 1–4.8)
LYMPHOCYTES NFR BLD AUTO: 32.4 % (ref 27–41)
MACROCYTES BLD QL SMEAR: ABNORMAL
MCH RBC QN AUTO: 37.8 PG (ref 27–31)
MCHC RBC AUTO-ENTMCNC: 35.4 G/DL (ref 32–36)
MCV RBC AUTO: 106.8 FL (ref 80–96)
MONOCYTES # BLD AUTO: 0.83 K/UL (ref 0–0.8)
MONOCYTES NFR BLD AUTO: 14.1 % (ref 2–6)
MPC BLD CALC-MCNC: 10.6 FL (ref 9.4–12.4)
NEUTROPHILS # BLD AUTO: 3.12 K/UL (ref 1.8–7.7)
NEUTROPHILS NFR BLD AUTO: 53 % (ref 53–65)
NITRITE UR QL STRIP: NEGATIVE
NONHDLC SERPL-MCNC: 147 MG/DL
NRBC # BLD AUTO: 0 X10E3/UL
NRBC, AUTO (.00): 0 %
PH UR STRIP: 6 PH UNITS
PLATELET # BLD AUTO: 137 K/UL (ref 150–400)
PLATELET MORPHOLOGY: ABNORMAL
POTASSIUM SERPL-SCNC: 4.6 MMOL/L (ref 3.5–5.1)
PROT SERPL-MCNC: 6.9 G/DL (ref 6.4–8.2)
PROT UR QL STRIP: NEGATIVE
RBC # BLD AUTO: 4.42 M/UL (ref 4.2–5.4)
RBC # UR STRIP: ABNORMAL /UL
RBC #/AREA URNS HPF: ABNORMAL /HPF
SODIUM SERPL-SCNC: 137 MMOL/L (ref 136–145)
SP GR UR STRIP: 1.01
SQUAMOUS #/AREA URNS LPF: ABNORMAL /LPF
TRIGL SERPL-MCNC: 114 MG/DL (ref 35–150)
UROBILINOGEN UR STRIP-ACNC: 0.2 MG/DL
VLDLC SERPL-MCNC: 23 MG/DL
WBC # BLD AUTO: 5.89 K/UL (ref 4.5–11)
WBC #/AREA URNS HPF: ABNORMAL /HPF

## 2022-01-18 PROCEDURE — 87086 URINE CULTURE/COLONY COUNT: CPT | Mod: GZ,,, | Performed by: CLINICAL MEDICAL LABORATORY

## 2022-01-18 PROCEDURE — 81001 URINALYSIS: ICD-10-PCS | Mod: ,,, | Performed by: CLINICAL MEDICAL LABORATORY

## 2022-01-18 PROCEDURE — 87086 CULTURE, URINE: ICD-10-PCS | Mod: GZ,,, | Performed by: CLINICAL MEDICAL LABORATORY

## 2022-01-18 PROCEDURE — G0439 PPPS, SUBSEQ VISIT: HCPCS | Mod: ,,, | Performed by: FAMILY MEDICINE

## 2022-01-18 PROCEDURE — 82306 VITAMIN D 25 HYDROXY: CPT | Mod: ,,, | Performed by: CLINICAL MEDICAL LABORATORY

## 2022-01-18 PROCEDURE — 85025 COMPLETE CBC W/AUTO DIFF WBC: CPT | Mod: ,,, | Performed by: CLINICAL MEDICAL LABORATORY

## 2022-01-18 PROCEDURE — 81001 URINALYSIS AUTO W/SCOPE: CPT | Mod: ,,, | Performed by: CLINICAL MEDICAL LABORATORY

## 2022-01-18 PROCEDURE — 80053 COMPREHEN METABOLIC PANEL: CPT | Mod: ,,, | Performed by: CLINICAL MEDICAL LABORATORY

## 2022-01-18 PROCEDURE — 85025 CBC WITH DIFFERENTIAL: ICD-10-PCS | Mod: ,,, | Performed by: CLINICAL MEDICAL LABORATORY

## 2022-01-18 PROCEDURE — 80061 LIPID PANEL: ICD-10-PCS | Mod: ,,, | Performed by: CLINICAL MEDICAL LABORATORY

## 2022-01-18 PROCEDURE — 80061 LIPID PANEL: CPT | Mod: ,,, | Performed by: CLINICAL MEDICAL LABORATORY

## 2022-01-18 PROCEDURE — 82306 VITAMIN D: ICD-10-PCS | Mod: ,,, | Performed by: CLINICAL MEDICAL LABORATORY

## 2022-01-18 PROCEDURE — G0439 PR MEDICARE ANNUAL WELLNESS SUBSEQUENT VISIT: ICD-10-PCS | Mod: ,,, | Performed by: FAMILY MEDICINE

## 2022-01-18 PROCEDURE — 80053 COMPREHENSIVE METABOLIC PANEL: ICD-10-PCS | Mod: ,,, | Performed by: CLINICAL MEDICAL LABORATORY

## 2022-01-18 RX ORDER — NITROFURANTOIN 25; 75 MG/1; MG/1
100 CAPSULE ORAL 2 TIMES DAILY
Qty: 14 CAPSULE | Refills: 0 | Status: SHIPPED | OUTPATIENT
Start: 2022-01-18 | End: 2022-03-29

## 2022-01-18 RX ORDER — MONTELUKAST SODIUM 10 MG/1
10 TABLET ORAL NIGHTLY
Qty: 90 TABLET | Refills: 1 | Status: SHIPPED | OUTPATIENT
Start: 2022-01-18 | End: 2023-01-01

## 2022-01-18 RX ORDER — GABAPENTIN 100 MG/1
100 CAPSULE ORAL 3 TIMES DAILY
Qty: 90 CAPSULE | Refills: 1 | Status: SHIPPED | OUTPATIENT
Start: 2022-01-18 | End: 2023-01-01 | Stop reason: ALTCHOICE

## 2022-01-18 NOTE — PROGRESS NOTES
[facility]     PATIENT NAME: Tosin Cortez   : 10/10/1932    AGE: 89 y.o. DATE: 2022   MRN: [id number]        Reason for Visit / Chief Complaint: Medicare AWV (Medicare Subs AWV )        Tosin Cortez presents for a Subsequent Medicare AWV today.     The following components were reviewed and updated:    Medical/Social/Family History:  Past Medical History:   Diagnosis Date    Hypertension         Family History   Problem Relation Age of Onset    Hypertension Mother     Cancer Mother     Heart disease Mother     Diabetes Mother     Diabetes Father     Cancer Sister         Social History     Tobacco Use   Smoking Status Never Smoker   Smokeless Tobacco Never Used      Social History     Substance and Sexual Activity   Alcohol Use Never       Family History   Problem Relation Age of Onset    Hypertension Mother     Cancer Mother     Heart disease Mother     Diabetes Mother     Diabetes Father     Cancer Sister        Past Surgical History:   Procedure Laterality Date    CATARACT EXTRACTION      CHOLECYSTECTOMY      HYSTERECTOMY      JOINT REPLACEMENT           · Allergies and Current Medications   Review of patient's allergies indicates:  No Known Allergies    Current Outpatient Medications:     clonazePAM (KLONOPIN) 0.5 MG tablet, take ONE-HALF TO one tablet BY MOUTH AT BEDTIME AS NEEDED, Disp: 30 tablet, Rfl: 5    fluticasone propionate (FLONASE) 50 mcg/actuation nasal spray, SPRAY one SPRAY in each nostril TWICE DAILY, Disp: 48 g, Rfl: 3    montelukast (SINGULAIR) 10 mg tablet, TAKE ONE TABLET BY MOUTH AT BEDTIME, Disp: 90 tablet, Rfl: 1    nebivoloL (BYSTOLIC) 5 MG Tab, Take 1 tablet (5 mg total) by mouth once daily. (Patient taking differently: Take 5 mg by mouth nightly.), Disp: 90 tablet, Rfl: 2    RESTASIS 0.05 % ophthalmic emulsion, INSTILL ONE DROP BOTH EYES TWICE DAILY, Disp: , Rfl:     valsartan (DIOVAN) 80 MG tablet, Take 1 tablet (80 mg total)  by mouth once daily., Disp: 90 tablet, Rfl: 2    diphenhydrAMINE HCL (BENADRYL) 2 % Gel, Apply to affected area 2-3 times per day (Patient not taking: Reported on 1/18/2022), Disp: 118 mL, Rfl: 2    ezetimibe (ZETIA) 10 mg tablet, Take 10 mg by mouth once daily., Disp: , Rfl:     gabapentin (NEURONTIN) 100 MG capsule, Take 1 capsule (100 mg total) by mouth 3 (three) times daily. (Patient not taking: Reported on 1/18/2022), Disp: 90 capsule, Rfl: 1    mometasone 0.1% (ELOCON) 0.1 % cream, Apply to affected area- 1-2 times per day (Patient not taking: Reported on 1/18/2022), Disp: 60 g, Rfl: 2    nitrofurantoin, macrocrystal-monohydrate, (MACROBID) 100 MG capsule, Take 1 capsule (100 mg total) by mouth 2 (two) times daily. (Patient not taking: Reported on 1/18/2022), Disp: 14 capsule, Rfl: 0    valACYclovir (VALTREX) 1000 MG tablet, Take 1,000 mg by mouth 2 (two) times daily., Disp: , Rfl:     · Health Risk Assessment   Fall Risk: Yes. Education given.   Obesity: BMI 31.45. Education given     Advance Directive:  Does not have an advanced directive. Verbal education and written education included in today's AVS.   Depression: PHQ9 1    HTN:  yes, DASH diet, exercise, weight management, med compliance, home BP monitoring, and follow-up discusse       Tobacco use: Denies  STI: Na   Alcohol misuse: Denies   Statin Use: No      · Health Risk Assessment  What is your age?: 80 or older  Are you male or female?: Female  During the past four weeks, how much have you been bothered by emotional problems such as feeling anxious, depressed, irritable, sad, or downhearted and blue?: Not at all  During the past five weeks, has your physical and/or emotional health limited your social activities with family, friends, neighbors, or groups?: Not at all  During the past four weeks, how much bodily pain have you generally had?: Moderate pain  During the past four weeks, was someone available to help if you needed and wanted help?:  Yes, as much as I wanted  During the past four weeks, what was the hardest physical activity you could do for at least two minutes?: Very light  Can you get to places out of walking distance without help?  (For example, can you travel alone on buses or taxis, or drive your own car?): Yes  Can you go shopping for groceries or clothes without someone's help?: No  Can you prepare your own meals?: Yes  Can you do your own housework without help?: No  Because of any health problems, do you need the help of another person with your personal care needs such as eating, bathing, dressing, or getting around the house?: No  Can you handle your own money without help?: Yes  During the past four weeks, how would you rate your health in general?: Good  How have things been going for you during the past four weeks?: Pretty well  Are you having difficulties driving your car?: No  Do you always fasten your seat belt when you are in a car?: Yes, usually  How often in the past four weeks have you been bothered by falling or dizzy when standing up?: Never  How often in the past four weeks have you been bothered by sexual problems?: Never  How often in the past four weeks have you been bothered by trouble eating well?: Never  How often in the past four weeks have you been bothered by teeth or denture problems?: Never  How often in the past four weeks have you been bothered with problems using the telephone?: Never  How often in the past four weeks have you been bothered by tiredness or fatigue?: Seldom  Have you fallen two or more times in the past year?: No  Are you afraid of falling?: Yes  Are you a smoker?: No  During the past four weeks, how many drinks of wine, beer, or other alcoholic beverages did you have?: No alcohol at all  Do you exercise for about 20 minutes three or more days a week?: No, I usually do not exercise this much  Have you been given any information to help you with hazards in your house that might hurt you?:  No  Have you been given any information to help you with keeping track of your medications?: No  How often do you have trouble taking medicines the way you've been told to take them?: I always take them as prescribed  How confident are you that you can control and manage most of your health problems?: Somewhat confident  What is your race? (Check all that apply.):     · Health Maintenance   Last eye exam: 2021 will request records   Last CV screen with lipids: 09/20/2021   Diabetes screening with fasting glucose or A1c: 09/20/2021   Colonoscopy: 11/17/2017   Flu Vaccine: Will request records   Pneumonia vaccines: Will request records   COVID vaccine: Moderna 01/14/2021 02/12/2021 11/01/2021   Hep B vaccine: NA   DEXA: 12/13/2019   Last pap/pelvic: Pt had hysterectomy   Last Mammogram: 12/15/2021   Last PSA screen: NA   AAA screening: NA (once in lifetime for males 65-75 who have smoked > 100 cigarettes in lifetime)  HIV Screeing: NA  Hepatitis C Screen: NA  Low Dose CT Scan: NA    Health Maintenance Topics with due status: Not Due       Topic Last Completion Date    Colonoscopy 11/17/2017    DEXA SCAN 12/03/2019    Lipid Panel 09/20/2021    Mammogram 12/15/2021     Health Maintenance Due   Topic Date Due    Shingles Vaccine (1 of 2) Never done    Pneumococcal Vaccines (Age 65+) (1 of 1 - PPSV23) Never done    Influenza Vaccine (1) Never done        Incontinence  Bowel: No   Bladder: Yes    Lab results available in Epic or see dates from [facility] above:   Lab Results   Component Value Date    CHOL 265 (H) 09/20/2021     Lab Results   Component Value Date    HDL 71 (H) 09/20/2021     Lab Results   Component Value Date    LDLCALC 167 09/20/2021     Lab Results   Component Value Date    TRIG 133 09/20/2021     Lab Results   Component Value Date    CHOLHDL 3.7 09/20/2021       No results found for: LABA1C, HGBA1C    Sodium   Date Value Ref Range Status   09/20/2021 137 136 - 145 mmol/L Final     Potassium   Date  "Value Ref Range Status   09/20/2021 3.9 3.5 - 5.1 mmol/L Final     Chloride   Date Value Ref Range Status   09/20/2021 101 98 - 107 mmol/L Final     CO2   Date Value Ref Range Status   09/20/2021 27 21 - 32 mmol/L Final     Glucose   Date Value Ref Range Status   09/20/2021 102 74 - 106 mg/dL Final     BUN   Date Value Ref Range Status   09/20/2021 17 7 - 18 mg/dL Final     Creatinine   Date Value Ref Range Status   09/20/2021 0.97 0.55 - 1.02 mg/dL Final     Calcium   Date Value Ref Range Status   09/20/2021 9.5 8.5 - 10.1 mg/dL Final     Total Protein   Date Value Ref Range Status   09/20/2021 7.6 6.4 - 8.2 g/dL Final     Albumin   Date Value Ref Range Status   09/20/2021 3.8 3.5 - 5.0 g/dL Final     Bilirubin, Total   Date Value Ref Range Status   09/20/2021 2.0 (H) >0.0 - 1.2 mg/dL Final     Alk Phos   Date Value Ref Range Status   09/20/2021 84 55 - 142 U/L Final     AST   Date Value Ref Range Status   09/20/2021 56 (H) 15 - 37 U/L Final     ALT   Date Value Ref Range Status   09/20/2021 71 (H) 13 - 56 U/L Final     Anion Gap   Date Value Ref Range Status   09/20/2021 13 7 - 16 mmol/L Final     eGFR   Date Value Ref Range Status   09/20/2021 58 (L) >=60 mL/min/1.73m² Final         No results found for: PSA (Delete this line if female pt)        · Care Team   PCP: Dr. Michelle    Eye specialist: Dr. Obrien         **See Completed Assessments for Annual Wellness visit within the encounter summary    The following assessments were completed & reviewed:  · Depression Screening  · Cognitive function Screening  · Timed Get Up Test  · Whisper Test  · Vision Screen  · Health Risk Assessment  · Checklist of ADLs and IADLs      Objective  Vitals:    01/18/22 1259   BP: 124/74   Pulse: 72   Resp: 20   Temp: 97.1 °F (36.2 °C)   SpO2: 99%   Weight: 85.7 kg (189 lb)   Height: 5' 5" (1.651 m)   PainSc: 0-No pain      Body mass index is 31.45 kg/m².  Ideal body weight: 57 kg (125 lb 10.6 oz)       Physical Exam      Assessment: "     1. Encounter for subsequent annual wellness visit (AWV) in Medicare patient    2. Body mass index 31.0-31.9, adult         Plan:    Referrals:       Advised to call office if does not hear from anyone with referral appt within 2-3 weeks to check on status of referral. Voiced understanding.      Discussed and provided with a screening schedule and personal prevention plan in accordance with USPSTF age appropriate recommendations and Medicare screening guidelines.   Education, counseling, and referrals were provided as needed.  After Visit Summary printed and given to patient which includes written education and a list of any referrals if indicated.     F/u plan for yearly AWV.    Signature: grupo Michelle MD

## 2022-01-18 NOTE — TELEPHONE ENCOUNTER
----- Message from Jerome Michelle MD sent at 1/18/2022  5:32 PM CST -----  Call pt labs results back and it is ok- cholesterol is down but borderline high- cont meds- also she has UTI again- sending meds. Plenty of water.       Patient notified and verbally understand. Yola Knight

## 2022-01-18 NOTE — PATIENT INSTRUCTIONS
"Counseling and Referral of Other Preventative  (Italic type indicates deductible and co-insurance are waived)    Patient Name: Tosin Cortez  Today's Date: 1/18/2022    Health Maintenance       Date Due Completion Date    Shingles Vaccine (1 of 2) Never done ---    Pneumococcal Vaccines (Age 65+) (1 of 1 - PPSV23) Never done ---    Influenza Vaccine (1) Never done ---    TETANUS VACCINE 01/18/2023 (Originally 10/10/1950) ---    Lipid Panel 09/20/2022 9/20/2021    Colonoscopy 11/17/2022 11/17/2017 (Done)    Override on 11/17/2017: Done    DEXA SCAN 12/03/2022 12/3/2019 (Done)    Override on 12/3/2019: Done    Mammogram 12/15/2022 12/15/2021        Orders Placed This Encounter   Procedures    Mammo Digital Diagnostic Bilat with Anthony   Patient Education       Preventive Health Care for Older Adults   The Basics   Written by the doctors and editors at [facility]   What is preventive health care? -- Preventive health care is any medical treatment or test that you have to try to prevent a health problem. Some of the most important preventive steps you can take are listed below.  · Lead a healthy lifestyle - People don't tend to think of lifestyle changes as a form of "treatment." But the truth is, lifestyle changes are often just as effective as, or even more effective than, any medicine you can take. If you exercise often, eat right, maintain a healthy weight, limit alcohol, and avoid smoking, you are less likely to develop all sorts of health problems than people who do not have these habits.  · Get all the right vaccines - Vaccines are shots that help protect you from certain diseases. They help prevent infections such as the flu and pneumonia. If you are 65 or older, you should get:  ? The flu vaccine once a year - Ask about getting the high-dose version, if it is available. In older people, the high-dose version works a little better than the standard dose version. But the standard dose is also helpful.  ? A pneumonia " "vaccine - Some people might need 2 vaccines for this.  ? The shingles vaccine once  ? A tetanus-diphtheria (Td) vaccine booster, probably once every 10 years. (A booster is a dose of a vaccine given to "jog" the immune system after you've gotten the first group of vaccine shots. Some experts think it's OK to have Td boosters less often.)   · Get screened when screening might help - Screening refers to any test that looks for early signs of a disease before the disease has started causing symptoms or problems. The most well-known screening tests are the ones that check for cancer, but other screening tests also exist.  ? All people age 65 and older should ask their doctor which forms of cancer screening might be appropriate for them. As you get older, some screening tests might no longer be needed. If your doctor suggests that you no longer need a screening test, such as a Pap test, mammogram, or colon cancer test, this does not mean that they think you are too old to care about. Rather, since many cancers take a long time to develop, screening as you get older might not be helpful and can even be harmful. That's because screening can lead to unnecessary treatment for findings that will never cause problems for you.  ? Men should get screened for a condition called "abdominal aortic aneurysm" once between the ages of 65 and 75 if they have ever smoked or have a close relative who  from or needed surgery for an abdominal aneurysm. This condition is a swelling of the main blood vessel that feeds the lower half of the body.  ? Women age 65 and older should get screened for osteoporosis (a bone thinning disorder).  · See your doctor or nurse regularly - During each visit, you will likely have several routine tests that are important in keeping track of your health. For instance, the doctor or nurse will probably check your blood pressure, body weight, and ask about your mood. They might also check your cholesterol " level, depending on what other health issues you have. Plus, the doctor or nurse will review your medicines, and might suggest stopping some that are less helpful and that can cause side effects as you get older.  · Take medicines that can prevent problems - Some people need to take medicines to keep from having heart attacks, strokes, or other problems. For example, many people in this age group also take calcium and vitamin D to reduce the risk of breaking a bone.  Why is my age important? -- As people get older, the way their body responds to medical problems changes. For instance, the body cannot fight infections as well. As a result, it is more important to get certain vaccines. The way people respond to medicines can also change as they get older. That's because the liver and kidneys, which break down or get rid of medicines, do not work as well as they once did.  Certain diseases also become more common as people age. Most forms of cancer and heart disease, for example, develop more often in older people than they do in younger people.  Keep in mind, though, that getting older does not have to mean that you get sicker or frail. Always check with your doctor about any changes in your body or your health. That way you can find out if the changes might be a problem, and if there are any treatments that might help.  What else can I do to stay as healthy as possible? -- You should:  · Take care to avoid falling - Here are some things that might help:  ? Make sure that all walkways in your home are well-lit, and clear of clutter, electrical cords, and loose rugs. Tuck electric cords out of the way and secure them to the wall or floor. Check that your loose rugs have nonskid backing, so they won't slip (figure 1).  ? Wear sturdy, comfortable shoes.  ? Try to stay active, because people who do some form of exercise are less likely to fall than people who don't.  ? Review the medicines you take with your doctor. Some  "medicines, such as sleeping pills, can increase your risk of falling and can be unsafe to use as you get older.  · Ask your doctor if it is still safe to drive - This is a tough question to ask, but it's an important one. As people get older, they sometimes have vision and hearing loss, and they react more slowly to things. These changes can increase the risk of car accidents.  · Talk to your doctor or nurse if you have trouble controlling your bladder or bowels - Not being able to control your bladder or bowels is called being "incontinent." If you have this problem, don't be embarrassed to bring it up at the doctor's office. Many treatments are available.  · Keep an up-to-date medicine list - Always bring your medicine list with you when you see the doctor or nurse. You can find an example of this kind of list at the following website: www.fda.gov/drugs/resources-you-drugs/my-medicine-record.  · Get help with bills or meals if you need it - If you cannot afford to pay all your bills or have trouble making meals for yourself, find out about services in your area that can help. This website is a good place to start: www.eldercare.[facility].gov.  · Stay socially connected - Social connection is important to good health. Let your doctor or nurse know if you spend almost all your time alone. They can help you find ways to meet new people and become involved in new activities.  Tell your doctor if you are being hurt, neglected, or cheated -- If one of your family members or someone who is supposed to take care of you is mistreating you, stealing money, or taking advantage of you in any way, tell your doctor. They can get you the help you need.  All topics are updated as new evidence becomes available and our peer review process is complete.  This topic retrieved from [employer] on: Sep 21, 2021.  Topic 03485 Version 20.0  Release: 29.4.2 - C29.263  © 2021 UpToDate, Inc. and/or its affiliates. All rights reserved.  figure 1: How " to avoid falling at home     This picture shows some of the things that can cause a fall in your home. Look around and remove any loose rugs, electrical cords, clutter, or furniture that could trip you.  Graphic 12652 Version 1.0    Consumer Information Use and Disclaimer   This information is not specific medical advice and does not replace information you receive from your health care provider. This is only a brief summary of general information. It does NOT include all information about conditions, illnesses, injuries, tests, procedures, treatments, therapies, discharge instructions or life-style choices that may apply to you. You must talk with your health care provider for complete information about your health and treatment options. This information should not be used to decide whether or not to accept your health care provider's advice, instructions or recommendations. Only your health care provider has the knowledge and training to provide advice that is right for you. The use of this information is governed by the [employer] End User License Agreement, available at https://www.Mint/en/solutions/Redgage/about/betty.The use of [employer] content is governed by the [employer] Terms of Use. ©2021 UpToDate, Inc. All rights reserved.  Copyright   © 2021 UpToDate, Inc. and/or its affiliates. All rights reserved.  Patient Education       Advance Directives   The Basics   Written by the doctors and editors at [facility]   What are advance directives? -- Advance directives are legal documents that allow you to spell out ahead of time what of types medical care you would want if you ever became unable to speak for yourself. These documents can help ensure that you get the care you want even if you have an unexpected serious illness or accident. The documents can also make things easier for the people who will need to make decisions for you if you ever become unable to make them for yourself.  Are there different  kinds of advance directives? -- Yes. The most useful kinds of advance directives are:  · Health care proxy (also called the durable power of  for health care) - The health care proxy document allows you to choose someone to make medical decisions for you if you become unable to speak for yourself. The benefit of having this document is that it makes your choice of a decision-maker clear to your doctors and family members. When you choose a health care proxy, it is important to talk to the person you choose about the things that you do or don't want. That way your decision-maker knows what to do later on if they ever have to speak for you.  · Living will - A living will is the document that tells health care providers what type of care you want if you become unable to speak for yourself. For instance, a living will allows you to record in writing whether you would want a feeding tube put in if you had a serious illness or accident.  · Do not resuscitate/do not intubate order (also called a DNR/DNI) - If you decide you do not want your heart restarted if it stops and you do not want a breathing tube put in if you stop breathing, you can ask for a DNR/DNI. This is a form that must be signed by a doctor. It tells all your health care providers that you have decided you do not want these treatments.  Advance directives work best when they are part of a team effort that includes not only the person making the decisions, but also doctors, emergency health workers, and places like hospitals and nursing homes.  The Physician Orders for Life Sustaining Treatment (POLST) is a form for people who already have a serious illness or are very weak and likely to need medical help. The POLST form spells out exactly what care should be given, and not given, based on your choices and wishes. It is signed by your doctor, and you can keep a copy at home to be used in the event of an emergency. A copy is also kept on file at any  "hospital or other place where you might get medical care. Not every state has a POLST program. To find out if your state has one, you can go online to www.polst.org.  How do I choose a health care proxy? -- Choose someone who:  · You know and trust  · Can separate their own wishes from yours  · You know would carry out your wishes if that became necessary  · Could be easily reached if they were needed  · Could handle it if other family members or loved ones wanted you to get treated differently than you would want  Some people choose a second person as an alternate proxy, in case their first choice cannot be reached at the time decisions need to be made.  Who should have an advance directive? -- Advance directives are a good idea for anyone, but they are especially important if:  · You are older than 65.  · You have a serious life-threatening illness, such as advanced cancer, or end-stage heart or liver failure.  · You want to make sure you can choose the person you would like as your health care proxy (decision-maker).  What kinds of decisions will I need to make? -- Your advance directives can have as much or as little detail as you want. But many people who have advance directives record their wishes about the following treatments:  · Breathing tubes - If you stop breathing or are having a very hard time breathing, you can get attached to a machine that will help you breathe. For that to happen, you will have to be "intubated." That means that a tube will be put down your throat and into your lungs. Then the tube will be connected to a breathing machine. When the tube is in place, you will not be able to talk, at least at first. Plus, you will probably be sedated, meaning that you are on medicines that make you sleep.  Sometimes a breathing machine is needed only for a short time. For instance, some people need the breathing machine just while they recover from a lung infection. When deciding about a breathing " "tube, consider whether you would want it at all, want it only for a short time, or want it no matter what. Also, keep in mind that any time a breathing machine is used, it is hard to know for sure if and when it will be able to be disconnected.  · Cardiopulmonary resuscitation (CPR) - If your heart stops beating suddenly, doctors might be able to restart it by pumping on your chest, putting in a breathing tube and pushing air into your lungs, giving you an electric shock (called "defibrillation"), and/or giving you special medicines. Some people recover completely after having their heart restarted. Others have permanent brain damage from a lack of blood flow to the brain; this is most likely in people who have an advanced, serious illness.  · Feeding tubes - If you become unable to eat, you can have a tube put into your stomach or intestines that can deliver nutrients. A feeding tube can keep a person's body going while they heal and gets strong. But it can also keep a person alive for a long time even if there is no chance the person will recover.  Can I change my mind? -- Yes. You can change your mind at any time. If you sign an advance directive and you decide you want a different kind of treatment or you no longer want the health care proxy you chose, all you have to do is tell your doctor or nurse about your new decision. If you want to name a new health care proxy or want to record new wishes, you can draw up new documents.  How can I draw up an advance directive? -- The following table lists resources that can help you learn more about making your own advance directives (table 1).  All topics are updated as new evidence becomes available and our peer review process is complete.  This topic retrieved from [employer] on: Sep 21, 2021.  Topic 83948 Version 13.0  Release: 29.4.2 - C29.263  © 2021 UpToDate, Inc. and/or its affiliates. All rights reserved.  table 1: Resources that can help you make advance " directives     Address  Phone number  Website    [facility]  [address] [place] [address] Toll-free: [phone]) [facility]  [phone]] http://assets.aar.org/external_sites/ caregiving/multimedia/EG_AdvanceDirectives.html    Aging with Dignity (Five Wishes form)  [address]  [place] [address] Toll-free: [phone]  [phone]] www.agingwithdignity.org    [employer]    Toll-free: [phone] www.caringinfo.org    [employer]  [employer]  [address] [place] [address] [phone] www.[employer]    Graphic 87137 Version 7.0  Consumer Information Use and Disclaimer   This information is not specific medical advice and does not replace information you receive from your health care provider. This is only a brief summary of general information. It does NOT include all information about conditions, illnesses, injuries, tests, procedures, treatments, therapies, discharge instructions or life-style choices that may apply to you. You must talk with your health care provider for complete information about your health and treatment options. This information should not be used to decide whether or not to accept your health care provider's advice, instructions or recommendations. Only your health care provider has the knowledge and training to provide advice that is right for you. The use of this information is governed by the [employer] End User License Agreement, available at https://www.Hatch.bigclix.com/en/solutions/[employer]/about/betty.The use of [employer]ight content is governed by the [employer] Terms of Use. ©2021 UpToDate, Inc. All rights reserved.  Copyright   © 2021 UpToDate, Inc. and/or its affiliates. All rights reserved.  Patient Education       Body Mass Index, Adult   About this topic   Body mass index is often called your BMI. It is a number that is calculated from your height and weight. Many people use it as a way to see if they are  overweight. BMI is an indirect measure of your body fat. Often, the more body fat or higher your BMI, the more likely it is you will have health problems like:  · Heart disease  · High blood pressure  · Type 2 diabetes  · Fatty liver disease  · Joint and back pain  · Obstructive sleep apnea  · Asthma  The right weight or BMI for you depends on a few things, like gender, age, and height. Finding out your BMI is easy to do. Your doctor will use this tool to learn more about your risk of having health problems.  BMI does not include things like your habits, where you live, or family history. BMI also does not discriminate between body fat and muscle weight. Some people with a normal BMI are still not healthy. Other people with a high BMI may be healthy. Most of the time, a person with a higher BMI is less healthy and will need more care in their lifetime. Ask your doctor for their opinion of your total health during a well visit or physical.  Being overweight or having a high BMI can hurt many parts of your body. Learn about your BMI and how your weight changes your health risks. Then you can make changes to keep yourself as healthy as you can.  General   By using a range, the BMI measurement tries to take into account many shapes and sizes of bodies. Most often the BMI categories are:  · Underweight = Less than 18.5  · Normal weight = Between 18.5 and 24.9  · Overweight = Between 25 and 29.9  · Obese = BMI of 30 or higher  There are a few ways to calculate your BMI. You will need to know how tall you are and how much you weigh to find your BMI. Note whether you are measuring in inches and pounds or meters and kilograms.  · Use a BMI calculator found on the internet. You may have to select your gender and then enter your height and weight. Be sure to use the correct description for your measurements: inches, meters, pounds, or kilograms. The calculator will tell you your BMI, based on the numbers you enter.  · Use a  BMI table. You may be able to find a BMI table on the internet, at your library, gym, or doctor's office. You find your height and weight on a table. These go along with a certain BMI or BMI range.  · Calculate your BMI. Example: man who is 6 feet (1.83 meters) tall and weighs 210 pounds (95 kg)  What to Do Nonmetric Unit of Measure Example Metric Unit of Measure Example   Measure your height. Multiple that number by itself. This is your height squared. 6 feet = 72 inches  72 inches x 72 inches = 5,184 square inches 1.83 meters  1.83 m x 1.83 m = 3.3489 square meters   Divide your weight by your height squared. 210 pounds ÷ 5,184 = [id number] 95.5 kg ÷ 3.3489 = 28.5   Multiply by 703 if using inches and pounds to calculate BMI [id number] x 703=28.5 Not needed   Find BMI BMI is 28.5 BMI is 28.5   What problems could happen?   Your BMI is more likely to overestimate how much body fat you have if you are an athlete or are very muscular. Your BMI may underestimate how much body fat you have if you are older or have lost a lot of muscle.  Where can I learn more?   Centers for Disease Control and Prevention  http://www.cdc.gov/healthyweight/assessing/bmi/adult_bmi/index.html   Last Reviewed Date   2021-10-11  Consumer Information Use and Disclaimer   This information is not specific medical advice and does not replace information you receive from your health care provider. This is only a brief summary of general information. It does NOT include all information about conditions, illnesses, injuries, tests, procedures, treatments, therapies, discharge instructions or life-style choices that may apply to you. You must talk with your health care provider for complete information about your health and treatment options. This information should not be used to decide whether or not to accept your health care providers advice, instructions or recommendations. Only your health care provider has the knowledge and training to  provide advice that is right for you.  Copyright   Copyright © 2021 UpToDate, Inc. and its affiliates and/or licensors. All rights reserved.  Patient Education       Preventing Falls ED   General Information   You came to the Emergency Department (ED) and the doctors are concerned about your risk for falling. You may be at a higher risk of falling based on your age or your illness. Some medicines make your risk of falling higher, as does adding new medicines or being in an area that is not familiar.  What care is needed at home?   · Call your regular doctor to let them know you were in the ED. Make a follow-up appointment if you were told to. Ask your doctor if you need to take vitamin D to help keep your bones strong.  · Make your home safer. Get rid of things that might make you trip or slip. These are things like loose rugs, electrical cords, or clutter. Add grab bars, a shower seat, and handrails.  · Wear sturdy shoes that fit well. Shoes should fit well, have a low heel, and the soles of the shoe should not be slippery. Walking in socks or with bare feet can raise your chance for falling.  · Stay active. Walk, garden, swim, or do something active on a regular basis. These activities may prevent you from getting hurt if you do fall. They also help with your strength and balance.  · Use a cane, walker, or other safety device. Be sure it is the right size for you and that you know how to use it safely. Be sure to wear your eyeglasses if they have been ordered for you.  · Get up slowly after you sit or lie down. Try to change positions slowly.  When do I need to get emergency help?   · Call an ambulance right away if:   ? You are hurt or cannot get up on your own.  ? You are not able to walk on your own.  ? You fall and hit your head and are on blood thinners.  ? You have severe neck pain after a fall.  ? You cannot stop any bleeding with 5 minutes of direct pressure.  ? You lose consciousness after a fall.  ? You  have clear fluid coming from your ears, nose, or mouth.  · Return to the ED if:   ? You have severe pain.  ? You have a cut that may need stitches.  ? You have headaches, dizziness, confusion, or ringing in your ears after your fall.  When do I need to call the doctor?   · You notice a lack of strength or trouble standing up.  · You are worried you may fall again.  · You are dizzy or feel unsteady when you walk.  · You have new or worsening symptoms.  Last Reviewed Date   2021-05-05  Consumer Information Use and Disclaimer   This information is not specific medical advice and does not replace information you receive from your health care provider. This is only a brief summary of general information. It does NOT include all information about conditions, illnesses, injuries, tests, procedures, treatments, therapies, discharge instructions or life-style choices that may apply to you. You must talk with your health care provider for complete information about your health and treatment options. This information should not be used to decide whether or not to accept your health care providers advice, instructions or recommendations. Only your health care provider has the knowledge and training to provide advice that is right for you.  Copyright   Copyright © 2021 [employer] and its affiliates and/or licensors. All rights reserved.  Patient Education       Exercise and Aging   General   Exercise can help older adults prevent falls and stay independent longer. Exercise may also help:  · You have stronger muscles and bones and better balance  · You lose weight and have more energy  · Make your heart and lungs stronger  · Lower your chance of health problems like heart disease, high blood sugar, or breast or colon cancer  · Control conditions like high blood pressure and diabetes  · You manage stress and get better sleep  · Your mood, self-esteem, and self-image  · How you think, plan, and pay attention  There are four  types of exercise: endurance, strength, balance, and flexibility.  · Endurance exercises get your heart rate up and keep it up for a while. Most people should get at least 30 minutes of exercise that gets your heart rate up on 5 or more days each week. Walking, swimming, biking, or going up and down stairs are kinds of exercises to get your heart rate up. You do not have to do all 30 minutes at one time. Try doing 10 minutes 3 times a day.  · Strength exercises build muscle. Lifting weights or doing knee bends are kinds of strength exercises.  · Balance exercises help to prevent falls. Raise up on your toes or stand on one leg as a kind of balance exercise.  · Flexibility exercises move your joints through a full range of motion and stretch your muscles. Bend forward in a chair to stretch your back, do stretches, or bend and extend your arms or legs as a kind of flexibility exercise. Try doing 10 minutes twice a week.  Plan to include all these exercise types in your exercise program. Always check with your doctor before you start a new exercise program.  Some people do not like formal exercise classes, but there are many ways to work your muscles each day. Here are some things you can do.  · Use steps instead of elevators.  · Park far away in parking lots to walk farther.  · Use the time while you wait. Do knee bends as you hold onto the kitchen counter while you wait for your coffee to brew.  · When you unload groceries, lift the bags or a container of milk a few times to exercise your arms and legs.  · Walk the long way when you go somewhere.  · After you walk to the bathroom, walk a few laps around the house before sitting down.  · Try doing different standing exercises after you wash your hands each time in the bathroom.  · Do balance exercises while you brush your teeth.  · Do an exercise or get up and walk during TV commercials.  · Don't forget to exercise small muscle groups like your hands, fingers, ankles,  toes, and neck. Wiggle, bend, flex, and rotate these joints from left to right and back to front to help to keep these joints flexible.  When do I need to call the doctor?   Stop exercising and talk to your doctor if you have any of these problems:  · Dizziness  · Shortness of breath  · Pain or pressure in the chest, arms, throat, jaw, or back  · Nausea or vomiting  · Blood clots  · Infection  · Joint swelling  · Open wounds  · Recent hip, back, or eye surgery  · New problems that start during exercise  Helpful tips   · If you have a health problem like heart disease or diabetes, talk with your doctor about the best exercises for you.  · Always warm up before you stretch. Heated muscles stretch much easier than cool muscles. Try to walk or bike at an easy pace for a few minutes to warm up your muscles.  · Slow your pace again after you exercise to cool down and bring your heart rate down slowly.  · Be sure you do not hold your breath when you exercise because it can raise your blood pressure. If you tend to hold your breath, try to count out loud when you exercise.  · Never bounce when doing stretches. Use slow and steady motions and hold your stretch for 20 to 30 seconds.  · Have a routine. Doing exercises before a meal may be a good way to get into a routine.  · Set small goals for yourself when you start to exercise. Use a chart to see how much you are doing. Ask someone to exercise with you.  · Exercise may be slightly uncomfortable, but you should not have sharp pains. If you do get sharp pains, stop what you are doing. If the sharp pains continue, call your doctor.  · Drink plenty of fluids without caffeine when you exercise and afterwards. Avoid outdoor exercise if it is too cold or too hot.  · Wear the right clothes and shoes. Try layers of clothes, so that you can take them off if you get too hot. Shoes should fit well and support your feet.  Where can I learn more?   National [place] on  Aging  https://www.karina.nih.gov/health/publication/exercise-physical-activity/introduction   Last Reviewed Date   2021-03-18  Consumer Information Use and Disclaimer   This information is not specific medical advice and does not replace information you receive from your health care provider. This is only a brief summary of general information. It does NOT include all information about conditions, illnesses, injuries, tests, procedures, treatments, therapies, discharge instructions or life-style choices that may apply to you. You must talk with your health care provider for complete information about your health and treatment options. This information should not be used to decide whether or not to accept your health care providers advice, instructions or recommendations. Only your health care provider has the knowledge and training to provide advice that is right for you.  Copyright   Copyright © 2021 [employer] and its affiliates and/or licensors. All rights reserved.  Patient Education       Heart Healthy Diet   General   With a heart healthy food plan, you will learn to make better food choices. This diet may help you lower your blood cholesterol level, manage your blood pressure, and lower your risk for heart problems. Smaller portions may also be helpful.  Sodium is a type of mineral found in many foods. It helps keep the balance of fluids in your body. Too much sodium can raise your blood pressure. It can also make you take on extra water. This is called edema. Pay careful attention to how much salt or sodium is in your food. You may need to avoid salt or eat foods with less sodium.  Cholesterol is a fat-like, waxy substance in your blood. It is normal to have some cholesterol in your blood because your body makes it. You also get extra cholesterol from all animal products. These are foods like meats, eggs, and dairy products. Too much cholesterol in your blood can block or damage your blood vessels. This  can lead to a heart attack or stroke.  Fats in your food have calories which give energy. Not all fats are bad. Some fats are healthy, like the fat found in fish, nuts, and olive oil. These are called unsaturated fats. They help manage body functions and lower cholesterol levels. Learn about the best fats to use in your diet and where to use them. Eating too much fat may make you more likely to weigh more than is healthy. This raises your risk of many heart problems.  Fiber is found in plants. Meat and dairy products do not have fiber in them. Fiber can help you lower your unhealthy cholesterol level. You may need more water as you eat more fiber so you do not get hard stools.  What lifestyle changes are needed?   Eat a healthy diet and workout often. Try to use as many calories as you take in each day.  What changes to diet are needed?   · Eat oily fish at least 2 times a week. These are fish like tuna, salmon, and mackerel.  · Limit sodium to no more than 2,300 mg of sodium per day. This is about 1 teaspoon (5 grams) of table salt. Use little or no salt when making food. Try other spices or seasoning instead.  · Limit how much cholesterol you eat to less than 300 mg per day. You can do this by having lean meats. Also eat lots of fruits, vegetables, and fat-free and low-fat dairy products.  · Limit how much trans fats you eat. Trans fats are found in many processed foods like stick margarine, shortening, and some fried foods. Also, lower how much hydrogenated fats you eat. They are used to make pastries, biscuits, cookies, crackers, chips, and many snack foods.  · Have no more than 1 drink per day of beer, wine, and mixed drinks (alcohol).  Who should use this diet?   A heart healthy diet is good for everyone.  What foods are good to eat?   · Grains: Try to eat 6 to 8 servings of whole grain, high fiber foods each day. These are whole grain bread, cereals, brown rice, or pasta.  · Fruits and vegetables: Eat 4 to 5  servings each day. Try to pick many kinds and colors. Try to eat more that are fresh or frozen. Look for low sodium or salt-free if you choose canned. Rinse canned items before cooking or eating. Dried peas, beans, and lentils are also good.  · Dairy: Choose low fat (1%) or fat-free milk. Eat nonfat or low-fat products.  · Protein: Try to eat more low fat or lean meats like chicken and turkey. Eat less red meat and eat more fish, eggs, egg whites, and beans instead.  · Fats: Use good fats found in fish, nuts, and avocados. Try using olive oil, canola oil, and low-sodium and low-fat salad dressing and mayonnaise. Use corn, safflower, sunflower, and soybean oils.  · Condiments: Use low-sodium or salt-free broths, soups, soy sauce, and condiments. Pepper, herbs, spices, vinegar, lemon or lime juices are great for seasoning. Sugar, cocoa powder, honey, syrup, and jams may be eaten in small amounts.  · Sweets: Low-fat, trans fat-free cookies, cakes, and pies; matt crackers; animal crackers; low-fat fig bars; and nelida snaps.     What foods should be limited or avoided?   · Grains: Salted breads, rolls, crackers, quick breads, self-rising flours, biscuit mixes, regular bread crumbs, instant hot cereals, commercially-prepared rice, pasta, stuffing mixes  · Fruits and vegetables: Commercially-prepared potatoes and vegetable mixes, regular canned vegetables and juices, vegetables frozen with sauce or pickled vegetables, processed fruits with added sugar or salt  · Dairy: Whole milk, malted milk, chocolate milk, buttermilk  · Protein: Smoked, cured, salted, or canned meat, fish, or poultry such as pfeiffer and sausages  · Fats: Cut back on solid fats like butter, lard, and margarine.  · Condiments and snacks: Salted and canned peas, beans, and olives; salted snack foods; fried foods; soda, juices, or other sweetened drinks; commercially-softened water. Miso, salsa, ketchup, barbecue sauce, Worcestershire sauce, soy sauce, and  teriyaki sauce are also high in salt.  · Sweets: High-fat baked goods such as muffins, donuts, pastries, commercial baked goods  Helpful tips   · When you go to a grocery store, have a list or a meal plan. Do not shop when you are hungry to avoid cravings for foods.  · You need to know about the sodium and fat content of the food you eat. Read food labels with care. They will show you how much of each is in a serving. This amount is given as a percentage of the total amount you need each day. Reading the labels will help you make healthy food choices.     · Avoid fast foods.  · Watch your portions when eating out. Split an order or bring home half for another meal.     · Talk to a dietitian for help.  Where can I learn more?   American Academy of [employer]icians  https://familydoctor.org/diet-and-exercise-for-a-healthy-heart/   American Heart Association  https://www.heart.org/en/healthy-living/healthy-eating/eat-smart/nutrition-basics/aha-diet-and-lifestyle-recommendations   NHS  https://www.nhs.uk/live-well/eat-well/   Last Reviewed Date   2020-03-27  Consumer Information Use and Disclaimer   This information is not specific medical advice and does not replace information you receive from your health care provider. This is only a brief summary of general information. It does NOT include all information about conditions, illnesses, injuries, tests, procedures, treatments, therapies, discharge instructions or life-style choices that may apply to you. You must talk with your health care provider for complete information about your health and treatment options. This information should not be used to decide whether or not to accept your health care providers advice, instructions or recommendations. Only your health care provider has the knowledge and training to provide advice that is right for you.  Copyright   Copyright © 2021 [employer] and its affiliates and/or licensors. All rights reserved.  Take medicine  as directed. if symptoms persists or worsen needs to go to er immediately.

## 2022-01-20 LAB — UA COMPLETE W REFLEX CULTURE PNL UR: NORMAL

## 2022-03-11 DIAGNOSIS — Z71.89 COMPLEX CARE COORDINATION: ICD-10-CM

## 2022-03-29 ENCOUNTER — OFFICE VISIT (OUTPATIENT)
Dept: FAMILY MEDICINE | Facility: CLINIC | Age: 87
End: 2022-03-29
Payer: MEDICARE

## 2022-03-29 VITALS
HEIGHT: 65 IN | TEMPERATURE: 97 F | OXYGEN SATURATION: 96 % | DIASTOLIC BLOOD PRESSURE: 80 MMHG | WEIGHT: 186.81 LBS | HEART RATE: 88 BPM | SYSTOLIC BLOOD PRESSURE: 148 MMHG | BODY MASS INDEX: 31.13 KG/M2 | RESPIRATION RATE: 18 BRPM

## 2022-03-29 DIAGNOSIS — R30.0 DYSURIA: ICD-10-CM

## 2022-03-29 DIAGNOSIS — N39.0 URINARY TRACT INFECTION WITHOUT HEMATURIA, SITE UNSPECIFIED: Primary | ICD-10-CM

## 2022-03-29 LAB
BACTERIA #/AREA URNS HPF: ABNORMAL /HPF
BILIRUB UR QL STRIP: NEGATIVE
CLARITY UR: CLEAR
COLOR UR: YELLOW
GLUCOSE UR STRIP-MCNC: NEGATIVE MG/DL
KETONES UR STRIP-SCNC: NEGATIVE MG/DL
LEUKOCYTE ESTERASE UR QL STRIP: ABNORMAL
NITRITE UR QL STRIP: NEGATIVE
PH UR STRIP: 6 PH UNITS
PROT UR QL STRIP: NEGATIVE
RBC # UR STRIP: NEGATIVE /UL
RBC #/AREA URNS HPF: ABNORMAL /HPF
SP GR UR STRIP: 1.02
SQUAMOUS #/AREA URNS LPF: ABNORMAL /LPF
UROBILINOGEN UR STRIP-ACNC: 1 MG/DL
WBC #/AREA URNS HPF: ABNORMAL /HPF

## 2022-03-29 PROCEDURE — 81001 URINALYSIS AUTO W/SCOPE: CPT | Mod: ,,, | Performed by: CLINICAL MEDICAL LABORATORY

## 2022-03-29 PROCEDURE — 99214 PR OFFICE/OUTPT VISIT, EST, LEVL IV, 30-39 MIN: ICD-10-PCS | Mod: ,,, | Performed by: NURSE PRACTITIONER

## 2022-03-29 PROCEDURE — 81001 URINALYSIS: ICD-10-PCS | Mod: ,,, | Performed by: CLINICAL MEDICAL LABORATORY

## 2022-03-29 PROCEDURE — 99214 OFFICE O/P EST MOD 30 MIN: CPT | Mod: ,,, | Performed by: NURSE PRACTITIONER

## 2022-03-29 RX ORDER — CEPHALEXIN 500 MG/1
500 CAPSULE ORAL EVERY 12 HOURS
Qty: 14 CAPSULE | Refills: 0 | Status: SHIPPED | OUTPATIENT
Start: 2022-03-29 | End: 2022-04-05

## 2022-03-30 NOTE — PROGRESS NOTES
jNew Clinic Note    Tosin Cortez is a 89 y.o. female presents to the clinic with c/o dysuria, denies low back pain, states hx of uti and wanted to come to clinic before it gets worst    Chief complaints    Chief Complaint   Patient presents with    Dysuria        Subjective:    HPI       Current Outpatient Medications:     clonazePAM (KLONOPIN) 0.5 MG tablet, take ONE-HALF TO one tablet BY MOUTH AT BEDTIME AS NEEDED, Disp: 30 tablet, Rfl: 5    ezetimibe (ZETIA) 10 mg tablet, Take 10 mg by mouth once daily., Disp: , Rfl:     fluticasone propionate (FLONASE) 50 mcg/actuation nasal spray, SPRAY one SPRAY in each nostril TWICE DAILY, Disp: 48 g, Rfl: 3    gabapentin (NEURONTIN) 100 MG capsule, Take 1 capsule (100 mg total) by mouth 3 (three) times daily., Disp: 90 capsule, Rfl: 1    montelukast (SINGULAIR) 10 mg tablet, Take 1 tablet (10 mg total) by mouth nightly., Disp: 90 tablet, Rfl: 1    nebivoloL (BYSTOLIC) 5 MG Tab, Take 1 tablet (5 mg total) by mouth once daily. (Patient taking differently: Take 5 mg by mouth nightly.), Disp: 90 tablet, Rfl: 2    RESTASIS 0.05 % ophthalmic emulsion, INSTILL ONE DROP BOTH EYES TWICE DAILY, Disp: , Rfl:     valACYclovir (VALTREX) 1000 MG tablet, Take 1,000 mg by mouth 2 (two) times daily., Disp: , Rfl:     valsartan (DIOVAN) 80 MG tablet, Take 1 tablet (80 mg total) by mouth once daily., Disp: 90 tablet, Rfl: 2    cephALEXin (KEFLEX) 500 MG capsule, Take 1 capsule (500 mg total) by mouth every 12 (twelve) hours. for 7 days, Disp: 14 capsule, Rfl: 0     Past Surgical History:   Procedure Laterality Date    CATARACT EXTRACTION      CHOLECYSTECTOMY      HYSTERECTOMY      JOINT REPLACEMENT          Social History     Socioeconomic History    Marital status:     Number of children: 4   Occupational History    Occupation: Retired   Tobacco Use    Smoking status: Never Smoker    Smokeless tobacco: Never Used   Substance and Sexual Activity    Alcohol use:  "Never    Drug use: Never    Sexual activity: Not Currently     Partners: Male        Review of Systems   Constitutional: Negative.  Negative for fatigue and fever.   Respiratory: Negative.    Cardiovascular: Negative.    Gastrointestinal: Negative.    Endocrine: Negative.    Genitourinary: Positive for dysuria and urgency.   Psychiatric/Behavioral: Negative.    All other systems reviewed and are negative.       Objective:  BP (!) 148/80   Pulse 88   Temp 97.2 °F (36.2 °C)   Resp 18   Ht 5' 5" (1.651 m)   Wt 84.7 kg (186 lb 12.8 oz)   SpO2 96%   BMI 31.09 kg/m²      Physical Exam  Constitutional:       Appearance: Normal appearance.   Cardiovascular:      Rate and Rhythm: Normal rate and regular rhythm.      Pulses: Normal pulses.      Heart sounds: Normal heart sounds.   Pulmonary:      Effort: Pulmonary effort is normal.      Breath sounds: Normal breath sounds.   Abdominal:      General: Abdomen is flat.      Palpations: Abdomen is soft.      Tenderness: There is no abdominal tenderness.   Musculoskeletal:      Cervical back: Normal range of motion.      Comments: Walk with walker   Skin:     General: Skin is warm and dry.   Neurological:      General: No focal deficit present.      Mental Status: She is alert and oriented to person, place, and time.   Psychiatric:         Mood and Affect: Mood normal.         Behavior: Behavior normal.          Assessment and Plan:  1. Urinary tract infection without hematuria, site unspecified    2. Dysuria         Urinary tract infection without hematuria, site unspecified  -     cephALEXin (KEFLEX) 500 MG capsule; Take 1 capsule (500 mg total) by mouth every 12 (twelve) hours. for 7 days  Dispense: 14 capsule; Refill: 0    Dysuria  -     Urinalysis  -     cephALEXin (KEFLEX) 500 MG capsule; Take 1 capsule (500 mg total) by mouth every 12 (twelve) hours. for 7 days  Dispense: 14 capsule; Refill: 0  -     Urinalysis, Microscopic         Active Problem List with Overview " Notes    Diagnosis Date Noted    Weight loss 10/25/2021    Chronic low back pain without sciatica 10/25/2021    Vitamin D deficiency 09/20/2021    Weakness 08/09/2021    Herpes zoster without complication 07/12/2021    Essential hypertension 06/14/2021    Other insomnia 06/14/2021    Hyperlipemia 06/14/2021    Dysuria 06/14/2021    Osteoarthritis 06/14/2021        Follow up if symptoms worsen or fail to improve.   Patient Instructions   1. Drink fluids  2. Continue meds  3. Follow up if not improved

## 2022-04-25 ENCOUNTER — OFFICE VISIT (OUTPATIENT)
Dept: FAMILY MEDICINE | Facility: CLINIC | Age: 87
End: 2022-04-25
Payer: MEDICARE

## 2022-04-25 DIAGNOSIS — G47.00 INSOMNIA, UNSPECIFIED TYPE: Primary | ICD-10-CM

## 2022-04-25 PROCEDURE — 99213 PR OFFICE/OUTPT VISIT, EST, LEVL III, 20-29 MIN: ICD-10-PCS | Mod: ,,, | Performed by: NURSE PRACTITIONER

## 2022-04-25 PROCEDURE — 99213 OFFICE O/P EST LOW 20 MIN: CPT | Mod: ,,, | Performed by: NURSE PRACTITIONER

## 2022-05-23 ENCOUNTER — OFFICE VISIT (OUTPATIENT)
Dept: FAMILY MEDICINE | Facility: CLINIC | Age: 87
End: 2022-05-23
Payer: MEDICARE

## 2022-05-23 VITALS
BODY MASS INDEX: 31.32 KG/M2 | SYSTOLIC BLOOD PRESSURE: 159 MMHG | HEIGHT: 65 IN | TEMPERATURE: 97 F | OXYGEN SATURATION: 96 % | WEIGHT: 188 LBS | DIASTOLIC BLOOD PRESSURE: 98 MMHG | HEART RATE: 94 BPM

## 2022-05-23 DIAGNOSIS — R31.9 URINARY TRACT INFECTION WITH HEMATURIA, SITE UNSPECIFIED: ICD-10-CM

## 2022-05-23 DIAGNOSIS — R30.0 DYSURIA: Primary | ICD-10-CM

## 2022-05-23 DIAGNOSIS — N39.0 URINARY TRACT INFECTION WITH HEMATURIA, SITE UNSPECIFIED: ICD-10-CM

## 2022-05-23 DIAGNOSIS — I10 ESSENTIAL HYPERTENSION: ICD-10-CM

## 2022-05-23 DIAGNOSIS — G47.00 INSOMNIA, UNSPECIFIED TYPE: ICD-10-CM

## 2022-05-23 LAB
BILIRUB SERPL-MCNC: NORMAL MG/DL
BLOOD URINE, POC: NORMAL
COLOR, POC UA: YELLOW
GLUCOSE UR QL STRIP: NORMAL
KETONES UR QL STRIP: NORMAL
LEUKOCYTE ESTERASE URINE, POC: NORMAL
NITRITE, POC UA: NORMAL
PH, POC UA: 6
PROTEIN, POC: NORMAL
SPECIFIC GRAVITY, POC UA: 1.02
UROBILINOGEN, POC UA: 1

## 2022-05-23 PROCEDURE — 99213 PR OFFICE/OUTPT VISIT, EST, LEVL III, 20-29 MIN: ICD-10-PCS | Mod: ,,, | Performed by: NURSE PRACTITIONER

## 2022-05-23 PROCEDURE — 81003 URINALYSIS AUTO W/O SCOPE: CPT | Mod: RHCUB | Performed by: NURSE PRACTITIONER

## 2022-05-23 PROCEDURE — 99213 OFFICE O/P EST LOW 20 MIN: CPT | Mod: ,,, | Performed by: NURSE PRACTITIONER

## 2022-05-23 RX ORDER — CEPHALEXIN 500 MG/1
500 CAPSULE ORAL EVERY 12 HOURS
Qty: 14 CAPSULE | Refills: 0 | Status: SHIPPED | OUTPATIENT
Start: 2022-05-23 | End: 2022-05-30

## 2022-05-23 RX ORDER — ZOLPIDEM TARTRATE 5 MG/1
5 TABLET ORAL NIGHTLY PRN
Qty: 30 TABLET | Refills: 1 | Status: SHIPPED | OUTPATIENT
Start: 2022-05-23 | End: 2023-01-01

## 2022-05-23 RX ORDER — VALSARTAN 80 MG/1
80 TABLET ORAL DAILY
Qty: 90 TABLET | Refills: 2 | Status: SHIPPED | OUTPATIENT
Start: 2022-05-23 | End: 2023-01-01

## 2022-05-23 NOTE — PROGRESS NOTES
Subjective:       Patient ID: Tosin Cortez is a 89 y.o. female.    Chief Complaint: Back Pain    88 y/o female reports dysuria x 2-3 days. History of frequent UTIs, this is similar to those symptoms. Also reports ongoing insomnia despite klonopin, other regimens have failed previously. Has previously requested ambien in the past and would like to try low dose.     Review of Systems   Constitutional: Negative.    Respiratory: Negative.    Cardiovascular: Negative.    Musculoskeletal: Negative.    Neurological: Negative.    Psychiatric/Behavioral: Negative.    All other systems reviewed and are negative.        Objective:      Physical Exam  Vitals and nursing note reviewed.   Constitutional:       Appearance: Normal appearance. She is normal weight.   HENT:      Head: Normocephalic and atraumatic.      Mouth/Throat:      Mouth: Mucous membranes are moist.      Pharynx: Oropharynx is clear.   Eyes:      Extraocular Movements: Extraocular movements intact.      Conjunctiva/sclera: Conjunctivae normal.      Pupils: Pupils are equal, round, and reactive to light.   Cardiovascular:      Rate and Rhythm: Normal rate and regular rhythm.      Pulses: Normal pulses.      Heart sounds: Normal heart sounds.   Pulmonary:      Effort: Pulmonary effort is normal.      Breath sounds: Normal breath sounds.   Abdominal:      General: Abdomen is flat. Bowel sounds are normal.      Palpations: Abdomen is soft.   Musculoskeletal:         General: Normal range of motion.      Cervical back: Normal range of motion and neck supple.      Comments: At baseline for patient, ambulatory with walker   Skin:     General: Skin is warm and dry.      Capillary Refill: Capillary refill takes less than 2 seconds.   Neurological:      General: No focal deficit present.      Mental Status: She is alert and oriented to person, place, and time. Mental status is at baseline.   Psychiatric:         Mood and Affect: Mood normal.         Behavior: Behavior  normal.         Thought Content: Thought content normal.         Judgment: Judgment normal.         Assessment:       Problem List Items Addressed This Visit        Cardiac/Vascular    Essential hypertension    Relevant Medications    valsartan (DIOVAN) 80 MG tablet       Renal/    Dysuria - Primary    Relevant Orders    POCT URINALYSIS W/O SCOPE (Completed)      Other Visit Diagnoses     Insomnia, unspecified type        Relevant Medications    zolpidem (AMBIEN) 5 MG Tab    Urinary tract infection with hematuria, site unspecified        Relevant Medications    cephALEXin (KEFLEX) 500 MG capsule          Plan:       Initiate antibiotics for UTI, review urine culture to ensure appropriate coverage  Will try low dose ambien for insomnia instead of klonopin, patient instructed to be very cautious with medication   Refill blood pressure medication, check blood pressures at home since BP is elevated during visit. If continues to trend high then follow back up

## 2022-06-03 ENCOUNTER — TELEPHONE (OUTPATIENT)
Dept: FAMILY MEDICINE | Facility: CLINIC | Age: 87
End: 2022-06-03
Payer: MEDICARE

## 2022-06-03 RX ORDER — CLOPIDOGREL BISULFATE 75 MG/1
75 TABLET ORAL DAILY
COMMUNITY
Start: 2022-06-03 | End: 2023-01-01 | Stop reason: SDUPTHER

## 2022-06-03 RX ORDER — CARVEDILOL 12.5 MG/1
12.5 TABLET ORAL 2 TIMES DAILY
COMMUNITY
Start: 2022-06-03 | End: 2022-01-01 | Stop reason: SDUPTHER

## 2022-06-03 RX ORDER — ATORVASTATIN CALCIUM 80 MG/1
80 TABLET, FILM COATED ORAL DAILY
COMMUNITY
Start: 2022-06-03 | End: 2023-01-01 | Stop reason: SDUPTHER

## 2022-06-03 RX ORDER — FAMOTIDINE 20 MG/1
20 TABLET, FILM COATED ORAL DAILY
COMMUNITY
Start: 2022-06-03 | End: 2023-01-01 | Stop reason: SDUPTHER

## 2022-06-03 NOTE — TELEPHONE ENCOUNTER
6/3/22-spoke with patient this afternoon. Reports she is doing fine but still having a problem with her feet going to sleep.denies headaches or weak spells/blurred vision. States the physical therapist was at her home now doing paperwork. States she is using her walker with ambulation. States her appetite is not as good but she is drinking fluids. Reviewed all discharge medications. States she has picked up all new meds and started them. Informed of her f/u appt.Instructed to bring all meds to follow up visit and to call office for questions/concerns. Also to seek medical attention for any new or worsening sx. [place]. [email or web address]

## 2022-06-16 ENCOUNTER — OFFICE VISIT (OUTPATIENT)
Dept: FAMILY MEDICINE | Facility: CLINIC | Age: 87
End: 2022-06-16
Payer: MEDICARE

## 2022-06-16 VITALS
TEMPERATURE: 98 F | BODY MASS INDEX: 30.82 KG/M2 | WEIGHT: 185 LBS | DIASTOLIC BLOOD PRESSURE: 73 MMHG | SYSTOLIC BLOOD PRESSURE: 146 MMHG | OXYGEN SATURATION: 94 % | HEART RATE: 78 BPM | HEIGHT: 65 IN

## 2022-06-16 DIAGNOSIS — I63.532 CEREBROVASCULAR ACCIDENT (CVA) DUE TO STENOSIS OF LEFT POSTERIOR CEREBRAL ARTERY: ICD-10-CM

## 2022-06-16 DIAGNOSIS — Z09 HOSPITAL DISCHARGE FOLLOW-UP: Primary | ICD-10-CM

## 2022-06-16 PROBLEM — R09.89 PULMONARY AIR TRAPPING: Status: ACTIVE | Noted: 2022-06-16

## 2022-06-16 PROBLEM — R29.898 RIGHT ARM WEAKNESS: Status: ACTIVE | Noted: 2022-05-30

## 2022-06-16 PROBLEM — R20.2 PARESTHESIA OF RIGHT UPPER AND LOWER EXTREMITY: Status: ACTIVE | Noted: 2022-05-30

## 2022-06-16 PROCEDURE — 99495 TCM SERVICES (MODERATE COMPLEXITY): ICD-10-PCS | Mod: ,,, | Performed by: NURSE PRACTITIONER

## 2022-06-16 PROCEDURE — 99495 TRANSJ CARE MGMT MOD F2F 14D: CPT | Mod: ,,, | Performed by: NURSE PRACTITIONER

## 2022-06-16 RX ORDER — ASPIRIN 81 MG/1
81 TABLET ORAL DAILY PRN
COMMUNITY
End: 2023-01-01

## 2022-06-16 RX ORDER — CHOLECALCIFEROL (VITAMIN D3) 25 MCG
1000 TABLET ORAL DAILY
COMMUNITY
End: 2023-01-01

## 2022-06-16 RX ORDER — AMOXICILLIN 500 MG
2 CAPSULE ORAL DAILY
COMMUNITY
End: 2023-01-01

## 2022-06-16 NOTE — PROGRESS NOTES
"Subjective:       Patient ID: Tosin Cortez is a 89 y.o. female.    Chief Complaint: Transitional Care    89 y/o  female here for follow up post discharge from hospital admission from [facility] for CVA. She was admitted on 5/30/2022 and discharged on 6/2/2022. 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 Reports that she had right arm and leg weakness and tingling at onset of stroke, feels that all symptoms have seem to resolve except for residual feeling that right arm feels "asleep".  Patient has long history of non compliance with medication, specifically antihyperlipidemics. She reports today that she has been taking all of her medications since discharge from hospital and verbalizes understanding of importance to adhere to regimen. Was started on ASA, plavix, and lipitor during admission to [facility]. Started physical therapy since discharge and has been doing well. Is suppose to follow up with neurology stroke clinic on the 5th floor at [facility] this week for follow up.     Review of Systems   Constitutional: Negative.    HENT: Negative.    Eyes: Negative.    Respiratory: Negative.    Cardiovascular: Negative.    Gastrointestinal: Negative.    Endocrine: Negative.    Musculoskeletal: Negative.    Integumentary:  Negative.   Neurological: Positive for numbness. Negative for dizziness, vertigo, tremors, seizures, syncope, facial asymmetry, speech difficulty, weakness, light-headedness, headaches, disturbances in coordination, memory loss and coordination difficulties.   Psychiatric/Behavioral: Negative.    All other systems reviewed and are negative.        Objective:      Physical Exam  Vitals and nursing note reviewed.   Constitutional:       Appearance: Normal appearance. She is normal weight.   HENT:      Head: Normocephalic and atraumatic.      Mouth/Throat:      Mouth: Mucous membranes are moist.      Pharynx: Oropharynx is clear.   Eyes:      Extraocular " Movements: Extraocular movements intact.      Conjunctiva/sclera: Conjunctivae normal.      Pupils: Pupils are equal, round, and reactive to light.   Cardiovascular:      Rate and Rhythm: Normal rate and regular rhythm.      Pulses: Normal pulses.      Heart sounds: Normal heart sounds.   Pulmonary:      Effort: Pulmonary effort is normal.      Breath sounds: Normal breath sounds.   Abdominal:      General: Abdomen is flat. Bowel sounds are normal.      Palpations: Abdomen is soft.   Musculoskeletal:         General: Normal range of motion.      Cervical back: Normal range of motion and neck supple.   Skin:     General: Skin is warm and dry.      Capillary Refill: Capillary refill takes less than 2 seconds.   Neurological:      General: No focal deficit present.      Mental Status: She is alert and oriented to person, place, and time. Mental status is at baseline.      GCS: GCS eye subscore is 4. GCS verbal subscore is 5. GCS motor subscore is 6.      Cranial Nerves: No dysarthria or facial asymmetry.      Sensory: Sensory deficit (right upper extremity, altered sensation) present.   Psychiatric:         Mood and Affect: Mood normal.         Behavior: Behavior normal.         Thought Content: Thought content normal.         Judgment: Judgment normal.         Assessment:       Problem List Items Addressed This Visit        Neuro    Cerebrovascular accident (CVA) due to stenosis of left posterior cerebral artery      Other Visit Diagnoses     Hospital discharge follow-up    -  Primary          Plan:       Discharge medications reconciled with current meds  Continue ASA, plavix, pepcid, and lipitor  Follow up with neurology stroke clinic as scheduled

## 2022-07-14 ENCOUNTER — OFFICE VISIT (OUTPATIENT)
Dept: FAMILY MEDICINE | Facility: CLINIC | Age: 87
End: 2022-07-14
Payer: MEDICARE

## 2022-07-14 VITALS
TEMPERATURE: 97 F | WEIGHT: 182 LBS | BODY MASS INDEX: 30.32 KG/M2 | DIASTOLIC BLOOD PRESSURE: 80 MMHG | SYSTOLIC BLOOD PRESSURE: 150 MMHG | HEIGHT: 65 IN | OXYGEN SATURATION: 99 % | RESPIRATION RATE: 18 BRPM | HEART RATE: 68 BPM

## 2022-07-14 DIAGNOSIS — H61.23 BILATERAL IMPACTED CERUMEN: Primary | ICD-10-CM

## 2022-07-14 PROCEDURE — 99213 OFFICE O/P EST LOW 20 MIN: CPT | Mod: ,,, | Performed by: NURSE PRACTITIONER

## 2022-07-14 PROCEDURE — 99213 PR OFFICE/OUTPT VISIT, EST, LEVL III, 20-29 MIN: ICD-10-PCS | Mod: ,,, | Performed by: NURSE PRACTITIONER

## 2022-07-14 NOTE — PROGRESS NOTES
THERON Ibarra   [place] [address] [place] [address], [place]  [address]     PATIENT NAME: Tosin Cortez  : 10/10/1932  DATE: 22  MRN: [id number]      Billing Provider: THERON Ibarra  Level of Service:   Patient PCP Information     Provider PCP Type    Primary Doctor No General          Reason for Visit / Chief Complaint: Otalgia, right ear ache, and Insomnia       Update PCP  Update Chief Complaint         History of Present Illness / Problem Focused Workflow     Tosin Cortez presents to the clinic with Otalgia, right ear ache, and Insomnia     89 y/o female presents for right ear pain, tried OTC drops without relief.       Review of Systems     Review of Systems   Constitutional: Negative.    HENT: Positive for ear pain.    Eyes: Negative.    Respiratory: Negative.    Cardiovascular: Negative.    Gastrointestinal: Negative.    Endocrine: Negative.    Musculoskeletal: Negative.    Integumentary:  Negative.   Neurological: Negative.    Psychiatric/Behavioral: Negative.    All other systems reviewed and are negative.       Medical / Social / Family History     Past Medical History:   Diagnosis Date    Hypertension        Past Surgical History:   Procedure Laterality Date    CATARACT EXTRACTION      CHOLECYSTECTOMY      HYSTERECTOMY      JOINT REPLACEMENT         Social History  Ms. Cortez  reports that she has never smoked. She has never used smokeless tobacco. She reports that she does not drink alcohol and does not use drugs.    Family History  Ms.'s Cortez family history includes Cancer in her mother and sister; Diabetes in her father and mother; Heart disease in her mother; Hypertension in her mother.    Medications and Allergies     Medications  Outpatient Medications Marked as Taking for the 22 encounter (Office Visit) with THERON Ibarra   Medication Sig Dispense Refill    aspirin (ECOTRIN) 81 MG EC tablet Take 81 mg by mouth once daily at 6am.      atorvastatin  (LIPITOR) 80 MG tablet Take 80 mg by mouth once daily.      clopidogreL (PLAVIX) 75 mg tablet Take 75 mg by mouth once daily.      famotidine (PEPCID) 20 MG tablet Take 20 mg by mouth once daily.      omega-3 fatty acids/fish oil (FISH OIL-OMEGA-3 FATTY ACIDS) 300-1,000 mg capsule Take 2 g by mouth once daily at 6am.      RESTASIS 0.05 % ophthalmic emulsion INSTILL ONE DROP BOTH EYES TWICE DAILY      valsartan (DIOVAN) 80 MG tablet Take 1 tablet (80 mg total) by mouth once daily. 90 tablet 2    vitamin D (VITAMIN D3) 1000 units Tab Take 1,000 Units by mouth once daily at 6am.      vitamin E 100 UNIT capsule Take 100 Units by mouth once daily at 6am.      zolpidem (AMBIEN) 5 MG Tab Take 1 tablet (5 mg total) by mouth nightly as needed (insomnia). 30 tablet 1       Allergies  Review of patient's allergies indicates:  No Known Allergies    Physical Examination     Vitals:    07/14/22 0956   BP: (!) 150/80   Pulse:    Resp:    Temp:      Physical Exam  Vitals and nursing note reviewed.   Constitutional:       Appearance: Normal appearance. She is normal weight.   HENT:      Head: Normocephalic and atraumatic.      Right Ear: Hearing and external ear normal. There is impacted cerumen.      Left Ear: Hearing and external ear normal. There is impacted cerumen.      Ears:      Comments: Reassessment of ears after bilateral irrigation, copious amount of cerumen removed from both ears, patient reports marked relief of pain and irritation. TM intact, normal. Ear canals normal and clear     Mouth/Throat:      Mouth: Mucous membranes are moist.      Pharynx: Oropharynx is clear.   Eyes:      Extraocular Movements: Extraocular movements intact.      Conjunctiva/sclera: Conjunctivae normal.      Pupils: Pupils are equal, round, and reactive to light.   Cardiovascular:      Rate and Rhythm: Normal rate and regular rhythm.      Pulses: Normal pulses.      Heart sounds: Normal heart sounds.   Pulmonary:      Effort: Pulmonary  effort is normal.      Breath sounds: Normal breath sounds.   Abdominal:      General: Abdomen is flat. Bowel sounds are normal.      Palpations: Abdomen is soft.   Musculoskeletal:         General: Normal range of motion.      Cervical back: Normal range of motion and neck supple.   Skin:     General: Skin is warm and dry.      Capillary Refill: Capillary refill takes less than 2 seconds.   Neurological:      General: No focal deficit present.      Mental Status: She is alert and oriented to person, place, and time. Mental status is at baseline.      Gait: Gait (ambulatory with walker at baseline) normal.      Comments: Ambulatory with walker at baseline   Psychiatric:         Mood and Affect: Mood normal.         Behavior: Behavior normal.         Thought Content: Thought content normal.         Judgment: Judgment normal.              Assessment and Plan (including Health Maintenance)      Problem List  Smart [employer]  Document Outside HM   :    Plan:   Bilateral impacted cerumen  -     Ear wax removal       Ear wax removed with full relief of symptoms. No further treatment necessary    Health Maintenance Due   Topic Date Due    Shingles Vaccine (1 of 2) Never done    Pneumococcal Vaccines (Age 65+) (1 - PCV) Never done    COVID-19 Vaccine (4 - Booster for Moderna series) 03/01/2022       Problem List Items Addressed This Visit    None     Visit Diagnoses     Bilateral impacted cerumen    -  Primary    Relevant Orders    Ear wax removal (Completed)          Health Maintenance Topics with due status: Not Due       Topic Last Completion Date    Colonoscopy 11/17/2017    DEXA Scan 12/03/2019    Mammogram 12/15/2021    Lipid Panel 05/30/2022    Influenza Vaccine Not Due       Future Appointments   Date Time Provider Department Center   1/31/2023  1:00 PM AWV NURSE, [facility] PRIMARY CARE [facility] LAUREN Lambert        There are no Patient Instructions on file for this visit.  No follow-ups on file.     Signature:   THERON Ibarra      Date of encounter: 7/14/22

## 2022-08-16 NOTE — PATIENT INSTRUCTIONS
Refill Coreg with refills for HR and BP   Restart this medication today when you pick it up  Begin antibiotic regimen for treatment of UTI  If still having insomnia, can return and we can discuss switching back to low dose Ambien

## 2022-08-16 NOTE — PROGRESS NOTES
"   THERON Ibarra   [place] [address] [place]  [address]     PATIENT NAME: Tosin Cortez  : 10/10/1932  DATE: 22  MRN: [id number]      Billing Provider: THERON Ibarra  Level of Service:   Patient PCP Information     Provider PCP Type    Primary Doctor No General          Reason for Visit / Chief Complaint: No chief complaint on file.       Update PCP  Update Chief Complaint         History of Present Illness / Problem Focused Workflow     Tosin Cortez presents to the clinic with No chief complaint on file.     88 y/o  female presents for dysuria and frequency started a few nights ago. Upon examination, HR and BP were slightly elevated. Patient reports that she was out of medicine in the Coreg bottle, had no refills, so she just threw away the empty bottle. Did not try to get anymore because she thought she was "finished" with the medicine. I explained to her the importance of making sure she gets medication refills or can call the office and ask if it is a medicine that she needs to always take and we will make sure she receives refills. May consider home health referral for medication management to ensure compliance.  She also has ongoing issues with insomnia, she reports feeling "shaky, like a crawling and jittery feeling sometimes when trying to sleep at night". Will treat UTI to decrease the urinary frequency symptoms, and hopefully this will reduce the amount of times that she has to get up during the night, as well as treat BP and HR, which may alleviate the jittery feeling that seemed to start after abruptly stopping Coreg. If symptoms persist despite these interventions, in adjunct to taking routine klonopin, may consider changing back to low dose Ambien that she reports helped in the past.         Review of Systems     Review of Systems   Constitutional: Negative.    HENT: Negative.    Eyes: Negative.    Respiratory: Negative.    Cardiovascular: " Negative.    Gastrointestinal: Negative.    Endocrine: Negative.    Genitourinary: Positive for dysuria and frequency.   Musculoskeletal: Negative.    Integumentary:  Negative.   Neurological: Positive for weakness, disturbances in coordination and coordination difficulties.   Hematological: Negative.    Psychiatric/Behavioral: Negative.    All other systems reviewed and are negative.       Medical / Social / Family History     Past Medical History:   Diagnosis Date    Hypertension        Past Surgical History:   Procedure Laterality Date    CATARACT EXTRACTION      CHOLECYSTECTOMY      HYSTERECTOMY      JOINT REPLACEMENT         Social History  Ms. Cortez  reports that she has never smoked. She has never used smokeless tobacco. She reports that she does not drink alcohol and does not use drugs.    Family History  Ms.'cliff Cortez family history includes Cancer in her mother and sister; Diabetes in her father and mother; Heart disease in her mother; Hypertension in her mother.    Medications and Allergies     Medications  Outpatient Medications Marked as Taking for the 8/16/22 encounter (Office Visit) with THERON Ibarra   Medication Sig Dispense Refill    aspirin (ECOTRIN) 81 MG EC tablet Take 81 mg by mouth once daily at 6am.      atorvastatin (LIPITOR) 80 MG tablet Take 80 mg by mouth once daily.      clonazePAM (KLONOPIN) 0.5 MG tablet Take 0.5 mg by mouth. 1/2 to one tablet at HS as needed      clopidogreL (PLAVIX) 75 mg tablet Take 75 mg by mouth once daily.      famotidine (PEPCID) 20 MG tablet Take 20 mg by mouth once daily.      omega-3 fatty acids/fish oil (FISH OIL-OMEGA-3 FATTY ACIDS) 300-1,000 mg capsule Take 2 g by mouth once daily at 6am.      RESTASIS 0.05 % ophthalmic emulsion INSTILL ONE DROP BOTH EYES TWICE DAILY      valsartan (DIOVAN) 80 MG tablet Take 1 tablet (80 mg total) by mouth once daily. 90 tablet 2    vitamin D (VITAMIN D3) 1000 units Tab Take 1,000 Units by mouth once  daily at 6am.      vitamin E 100 UNIT capsule Take 100 Units by mouth once daily at 6am.         Allergies  Review of patient's allergies indicates:  No Known Allergies    Physical Examination     Vitals:    08/16/22 1206   BP: (!) 155/95   Pulse:    Temp:      Physical Exam  Vitals and nursing note reviewed.   Constitutional:       Appearance: Normal appearance. She is normal weight.   HENT:      Head: Normocephalic and atraumatic.      Mouth/Throat:      Mouth: Mucous membranes are moist.      Pharynx: Oropharynx is clear.   Eyes:      Extraocular Movements: Extraocular movements intact.      Conjunctiva/sclera: Conjunctivae normal.      Pupils: Pupils are equal, round, and reactive to light.   Cardiovascular:      Rate and Rhythm: Regular rhythm. Tachycardia present.      Pulses: Normal pulses.      Heart sounds: Normal heart sounds.   Pulmonary:      Effort: Pulmonary effort is normal.      Breath sounds: Normal breath sounds.   Abdominal:      General: Abdomen is flat. Bowel sounds are normal.      Palpations: Abdomen is soft.   Musculoskeletal:         General: Normal range of motion.      Cervical back: Normal range of motion and neck supple.   Skin:     General: Skin is warm and dry.      Capillary Refill: Capillary refill takes less than 2 seconds.   Neurological:      General: No focal deficit present.      Mental Status: She is alert and oriented to person, place, and time. Mental status is at baseline.      Motor: Weakness (residual weakness on right upper and lower extremity from recent CVA) present.   Psychiatric:         Mood and Affect: Mood normal.         Behavior: Behavior normal.         Thought Content: Thought content normal.         Judgment: Judgment normal.              Assessment and Plan (including Health Maintenance)      Problem List  Smart Sets  Document Outside HM   :    Plan:   Dysuria  -     POCT URINALYSIS W/O SCOPE  -     nitrofurantoin, macrocrystal-monohydrate, (MACROBID) 100 MG  capsule; Take 1 capsule (100 mg total) by mouth 2 (two) times daily. For Urinary Tract Infection for 7 days  Dispense: 14 capsule; Refill: 0    Essential hypertension  -     carvediloL (COREG) 12.5 MG tablet; Take 1 tablet (12.5 mg total) by mouth 2 (two) times daily. (Patient not taking: Reported on 8/16/2022)  Dispense: 180 tablet; Refill: 0           Health Maintenance Due   Topic Date Due    Shingles Vaccine (1 of 2) Never done    Pneumococcal Vaccines (Age 65+) (1 - PCV) Never done    COVID-19 Vaccine (4 - Booster for Moderna series) 03/01/2022       Problem List Items Addressed This Visit        Cardiac/Vascular    Essential hypertension    Relevant Medications    carvediloL (COREG) 12.5 MG tablet       Renal/    Dysuria - Primary    Relevant Medications    nitrofurantoin, macrocrystal-monohydrate, (MACROBID) 100 MG capsule    Other Relevant Orders    POCT URINALYSIS W/O SCOPE (Completed)          Health Maintenance Topics with due status: Not Due       Topic Last Completion Date    Colonoscopy 11/17/2017    DEXA Scan 12/03/2019    Mammogram 12/15/2021    Lipid Panel 05/30/2022    Influenza Vaccine Not Due       Future Appointments   Date Time Provider Department Center   1/31/2023  1:00 PM AWV NURSE, [facility] PRIMARY CARE [facility] LAUREN Lambert        Patient Instructions   Refill Coreg with refills for HR and BP   Restart this medication today when you pick it up  Begin antibiotic regimen for treatment of UTI  If still having insomnia, can return and we can discuss switching back to low dose Ambien     Follow up if symptoms worsen or fail to improve.     Signature:  THERON Ibarra      Date of encounter: 8/16/22

## 2022-09-08 NOTE — PROGRESS NOTES
"Subjective:       Patient ID: Tosin Cortez is a 89 y.o. female.    Chief Complaint: Urinary Tract Infection (Pt. Does not know which meds she is on, she will bring them all with her to her next visit)    HPI    Ms. Cortez is an 89-year-old woman with the medical problems listed below who comes to clinic to get her urine checked. She was recently treated for a UTI . She is asymptomatic today.       She walks with a walker; has been using this for 3-4 years. Denies any recent falls    Hx of CVA; sees neurologist.     Her last brother  this year.     Not currently driving.     Interested in senior services.       123/70   No falls.    Son lives with her.            Objective:    /70   Pulse 79   Temp 97.2 °F (36.2 °C) (Oral)   Ht 5' 5" (1.651 m)   Wt 82.1 kg (181 lb)   SpO2 95%   BMI 30.12 kg/m²     Physical Exam    Gen: well-groomed, well-dressed. No apparent distress  HEENT:  NCAT, symmetric  Pulm: normal work of breathing, no accessory muscle use;  Neuro: CN II-XII grossly normal   Psych: pleasant affect, congruent mood. Linear thought; good eye contact  SKIN: no rashes present on face, neck, hands    Assessment / Plan:       Problem List Items Addressed This Visit          Renal/    Asymptomatic bacteriuria     Recentl treated for UTI, though she states she was asymptomatic at the time.  She continue to feel well, without symptoms, and was told to come to clinic for urine evaluation. Will obtain urine culture today to investigate for the presence of  asymptomatic bacteriuria.         Relevant Orders    Urinalysis (Completed)    Urine Culture High Risk (Completed)    Urinalysis, Microscopic (Completed)    Urinary tract infection without hematuria - Primary    Relevant Orders    POCT URINALYSIS W/O SCOPE (Completed)               "

## 2022-09-11 PROBLEM — N39.0 URINARY TRACT INFECTION WITHOUT HEMATURIA: Status: ACTIVE | Noted: 2022-01-01

## 2022-09-11 PROBLEM — R82.71 ASYMPTOMATIC BACTERIURIA: Status: ACTIVE | Noted: 2022-01-01

## 2022-09-11 NOTE — ASSESSMENT & PLAN NOTE
Recentl treated for UTI, though she states she was asymptomatic at the time.  She continue to feel well, without symptoms, and was told to come to clinic for urine evaluation. Will obtain urine culture today to investigate for the presence of  asymptomatic bacteriuria.

## 2022-09-14 NOTE — PROGRESS NOTES
Have tried numerous times to reach pt. By phone to discuss urine results-no answer. Lab letter mailed to pt.

## 2022-11-21 PROBLEM — F32.A DEPRESSION: Status: ACTIVE | Noted: 2022-01-01

## 2022-11-21 NOTE — PATIENT INSTRUCTIONS
- Make an appointment with your neurologist Dr. Miranda to discuss your hand and feet tingling    - Bring a family member to your next appointment.

## 2022-11-21 NOTE — PROGRESS NOTES
"Subjective:       Patient ID: Tosin Cortez is a 89 y.o. female.    Chief Complaint: Consult (C/o "hand and foot are falling asleep"  R ft./hand)    HPI    89-year-old woman with the medical problems listed below who comes to clinic generally feeling bad. She does not have any specific somatic complaints but reports poor sleep, social isolation, and sadness.    She lost her last brother in July. She is the last remaining sibling her family. She does feel like she may be depressed. This will be the first [place] she has not cooked for her family. Tired of watching television. Has anhedonia and doesn't like to read anymore. Used to sing in [facility] choir but no longer doing that.    Doesn't have any fever or cough. Denies any urinary problems.    Hasn't been able to go to [place] due to covid.    Her cell is [phone]  Son is trevin - emergency contact on file.      Objective:    BP (!) 143/75   Pulse 78   Temp 97.6 °F (36.4 °C) (Oral)   Ht 5' 5" (1.651 m)   Wt 79.4 kg (175 lb)   SpO2 95%   BMI 29.12 kg/m²     Physical Exam      Gen: well-groomed, well-dressed. No apparent distress  HEENT:  NCAT, symmetric  Pulm: normal work of breathing, no accessory muscle use;  Neuro: CN II-XII grossly normal; slow gait; walking with walker  Psych: pleasant affect, congruent mood. Linear thought; good eye contact  SKIN: no rashes present on face, neck, hands    Assessment:       Problem List Items Addressed This Visit          Psychiatric    Depression - Primary     Reports anhedonia and sadness. Discussed referral to senior services program. Referral placed today, and she will have the opportunity for consultation with geriatric psychiatrist; she would likely benefit from SSRI.            Face to face encounter time 30 minutes.    Non face to face encounter time 15 minutes.  Reviewing separate obtained history, counseling and educating the patient, family and/or other caregivers, ordering medications, tests or procedures; " referring and communicating with other health care professionals; documenting clinical information in the electronic medical record; care coordination; independently interpreting results and communicating results to the patient, family, and/or caregivers.    Total time for visit  45 minutes

## 2022-11-21 NOTE — ASSESSMENT & PLAN NOTE
Reports anhedonia and sadness. Discussed referral to senior services program. Referral placed today, and she will have the opportunity for consultation with geriatric psychiatrist; she would likely benefit from SSRI.

## 2022-12-12 PROBLEM — N39.0 URINARY TRACT INFECTION WITHOUT HEMATURIA: Status: RESOLVED | Noted: 2022-01-01 | Resolved: 2022-01-01

## 2023-01-01 ENCOUNTER — OFFICE VISIT (OUTPATIENT)
Dept: FAMILY MEDICINE | Facility: CLINIC | Age: 88
End: 2023-01-01
Payer: MEDICARE

## 2023-01-01 ENCOUNTER — HOSPITAL ENCOUNTER (INPATIENT)
Facility: HOSPITAL | Age: 88
LOS: 1 days | Discharge: HOME | DRG: 305 | End: 2023-04-05
Attending: HOSPITALIST | Admitting: HOSPITALIST
Payer: MEDICARE

## 2023-01-01 ENCOUNTER — CLINICAL SUPPORT (OUTPATIENT)
Dept: PSYCHIATRY | Facility: HOSPITAL | Age: 88
End: 2023-01-01
Attending: PSYCHIATRY & NEUROLOGY
Payer: MEDICARE

## 2023-01-01 ENCOUNTER — DOCUMENTATION ONLY (OUTPATIENT)
Dept: PSYCHIATRY | Facility: HOSPITAL | Age: 88
End: 2023-01-01
Payer: MEDICARE

## 2023-01-01 ENCOUNTER — TELEPHONE (OUTPATIENT)
Dept: FAMILY MEDICINE | Facility: CLINIC | Age: 88
End: 2023-01-01
Payer: MEDICARE

## 2023-01-01 ENCOUNTER — HOSPITAL ENCOUNTER (INPATIENT)
Facility: HOSPITAL | Age: 88
LOS: 22 days | Discharge: HOME HEALTH | DRG: 949 | End: 2023-08-04
Attending: HOSPITALIST | Admitting: HOSPITALIST
Payer: MEDICARE

## 2023-01-01 ENCOUNTER — PATIENT OUTREACH (OUTPATIENT)
Dept: ADMINISTRATIVE | Facility: CLINIC | Age: 88
End: 2023-01-01

## 2023-01-01 ENCOUNTER — LAB REQUISITION (OUTPATIENT)
Dept: LAB | Facility: HOSPITAL | Age: 88
End: 2023-01-01
Attending: NURSE PRACTITIONER
Payer: MEDICARE

## 2023-01-01 VITALS
RESPIRATION RATE: 16 BRPM | SYSTOLIC BLOOD PRESSURE: 132 MMHG | TEMPERATURE: 98 F | HEIGHT: 65 IN | WEIGHT: 173.38 LBS | OXYGEN SATURATION: 97 % | HEART RATE: 100 BPM | DIASTOLIC BLOOD PRESSURE: 74 MMHG | BODY MASS INDEX: 28.89 KG/M2

## 2023-01-01 VITALS
DIASTOLIC BLOOD PRESSURE: 68 MMHG | BODY MASS INDEX: 27.77 KG/M2 | HEART RATE: 78 BPM | WEIGHT: 166.69 LBS | RESPIRATION RATE: 20 BRPM | HEIGHT: 65 IN | TEMPERATURE: 98 F | OXYGEN SATURATION: 95 % | SYSTOLIC BLOOD PRESSURE: 125 MMHG

## 2023-01-01 VITALS
HEART RATE: 98 BPM | TEMPERATURE: 96 F | OXYGEN SATURATION: 95 % | SYSTOLIC BLOOD PRESSURE: 189 MMHG | DIASTOLIC BLOOD PRESSURE: 99 MMHG | BODY MASS INDEX: 28.66 KG/M2 | WEIGHT: 172 LBS | HEIGHT: 65 IN

## 2023-01-01 VITALS
TEMPERATURE: 97 F | HEIGHT: 65 IN | WEIGHT: 174 LBS | HEART RATE: 96 BPM | DIASTOLIC BLOOD PRESSURE: 88 MMHG | OXYGEN SATURATION: 97 % | BODY MASS INDEX: 28.99 KG/M2 | SYSTOLIC BLOOD PRESSURE: 140 MMHG

## 2023-01-01 VITALS
DIASTOLIC BLOOD PRESSURE: 57 MMHG | SYSTOLIC BLOOD PRESSURE: 138 MMHG | HEART RATE: 76 BPM | BODY MASS INDEX: 27.22 KG/M2 | HEIGHT: 65 IN | TEMPERATURE: 98 F | OXYGEN SATURATION: 95 % | RESPIRATION RATE: 18 BRPM | WEIGHT: 163.38 LBS

## 2023-01-01 VITALS
HEART RATE: 63 BPM | TEMPERATURE: 96 F | SYSTOLIC BLOOD PRESSURE: 155 MMHG | DIASTOLIC BLOOD PRESSURE: 87 MMHG | RESPIRATION RATE: 20 BRPM

## 2023-01-01 VITALS
HEIGHT: 65 IN | BODY MASS INDEX: 24.49 KG/M2 | OXYGEN SATURATION: 93 % | DIASTOLIC BLOOD PRESSURE: 81 MMHG | WEIGHT: 147 LBS | TEMPERATURE: 98 F | HEART RATE: 69 BPM | SYSTOLIC BLOOD PRESSURE: 183 MMHG

## 2023-01-01 VITALS
DIASTOLIC BLOOD PRESSURE: 80 MMHG | HEART RATE: 107 BPM | TEMPERATURE: 98 F | RESPIRATION RATE: 16 BRPM | SYSTOLIC BLOOD PRESSURE: 131 MMHG | HEIGHT: 65 IN | WEIGHT: 168 LBS | BODY MASS INDEX: 27.99 KG/M2 | OXYGEN SATURATION: 91 %

## 2023-01-01 VITALS
DIASTOLIC BLOOD PRESSURE: 68 MMHG | RESPIRATION RATE: 20 BRPM | SYSTOLIC BLOOD PRESSURE: 131 MMHG | TEMPERATURE: 97 F | HEART RATE: 63 BPM

## 2023-01-01 DIAGNOSIS — S73.004A: ICD-10-CM

## 2023-01-01 DIAGNOSIS — R31.9 URINARY TRACT INFECTION WITH HEMATURIA, SITE UNSPECIFIED: Primary | ICD-10-CM

## 2023-01-01 DIAGNOSIS — D69.6 THROMBOCYTOPENIA, UNSPECIFIED: ICD-10-CM

## 2023-01-01 DIAGNOSIS — I63.532 CEREBROVASCULAR ACCIDENT (CVA) DUE TO STENOSIS OF LEFT POSTERIOR CEREBRAL ARTERY: ICD-10-CM

## 2023-01-01 DIAGNOSIS — R30.0 DYSURIA: ICD-10-CM

## 2023-01-01 DIAGNOSIS — E78.5 HYPERLIPIDEMIA, UNSPECIFIED HYPERLIPIDEMIA TYPE: Primary | ICD-10-CM

## 2023-01-01 DIAGNOSIS — F01.A0 MAJOR NEUROCOGNITIVE DISORDER, DUE TO VASCULAR DISEASE, WITHOUT BEHAVIORAL DISTURBANCE, MILD: Chronic | ICD-10-CM

## 2023-01-01 DIAGNOSIS — N39.0 URINARY TRACT INFECTION WITH HEMATURIA, SITE UNSPECIFIED: Primary | ICD-10-CM

## 2023-01-01 DIAGNOSIS — Z00.00 ENCOUNTER FOR SUBSEQUENT ANNUAL WELLNESS VISIT (AWV) IN MEDICARE PATIENT: Primary | ICD-10-CM

## 2023-01-01 DIAGNOSIS — I10 ESSENTIAL HYPERTENSION: ICD-10-CM

## 2023-01-01 DIAGNOSIS — G47.09 OTHER INSOMNIA: ICD-10-CM

## 2023-01-01 DIAGNOSIS — R26.81 GAIT INSTABILITY: ICD-10-CM

## 2023-01-01 DIAGNOSIS — F01.A0 MAJOR NEUROCOGNITIVE DISORDER, DUE TO VASCULAR DISEASE, WITHOUT BEHAVIORAL DISTURBANCE, MILD: ICD-10-CM

## 2023-01-01 DIAGNOSIS — F32.0 CURRENT MILD EPISODE OF MAJOR DEPRESSIVE DISORDER WITHOUT PRIOR EPISODE: Primary | ICD-10-CM

## 2023-01-01 DIAGNOSIS — R63.4 WEIGHT LOSS: ICD-10-CM

## 2023-01-01 DIAGNOSIS — Z09 HOSPITAL DISCHARGE FOLLOW-UP: Primary | ICD-10-CM

## 2023-01-01 DIAGNOSIS — R53.1 WEAKNESS: ICD-10-CM

## 2023-01-01 DIAGNOSIS — F32.0 CURRENT MILD EPISODE OF MAJOR DEPRESSIVE DISORDER WITHOUT PRIOR EPISODE: Primary | Chronic | ICD-10-CM

## 2023-01-01 DIAGNOSIS — R30.0 DYSURIA: Primary | ICD-10-CM

## 2023-01-01 DIAGNOSIS — N39.0 URINARY TRACT INFECTION WITHOUT HEMATURIA, SITE UNSPECIFIED: ICD-10-CM

## 2023-01-01 DIAGNOSIS — R05.9 COUGH, UNSPECIFIED TYPE: ICD-10-CM

## 2023-01-01 DIAGNOSIS — E78.5 HYPERLIPIDEMIA, UNSPECIFIED HYPERLIPIDEMIA TYPE: ICD-10-CM

## 2023-01-01 DIAGNOSIS — R00.0 TACHYCARDIA: ICD-10-CM

## 2023-01-01 DIAGNOSIS — I10 UNCONTROLLED HYPERTENSION: ICD-10-CM

## 2023-01-01 DIAGNOSIS — I10 HYPERTENSION, UNSPECIFIED TYPE: Primary | ICD-10-CM

## 2023-01-01 DIAGNOSIS — Z91.148 MEDICATION NON-COMPLIANCE DUE TO EXCESSIVE PILL BURDEN: ICD-10-CM

## 2023-01-01 DIAGNOSIS — Z71.89 COMPLEX CARE COORDINATION: ICD-10-CM

## 2023-01-01 DIAGNOSIS — R54 FRAILTY SYNDROME IN GERIATRIC PATIENT: ICD-10-CM

## 2023-01-01 DIAGNOSIS — R20.2 PARESTHESIA OF RIGHT UPPER AND LOWER EXTREMITY: ICD-10-CM

## 2023-01-01 DIAGNOSIS — I10 HYPERTENSION: ICD-10-CM

## 2023-01-01 DIAGNOSIS — I70.0 AORTIC ATHEROSCLEROSIS: ICD-10-CM

## 2023-01-01 DIAGNOSIS — F32.0 MAJOR DEPRESSIVE DISORDER, SINGLE EPISODE, MILD: Primary | ICD-10-CM

## 2023-01-01 DIAGNOSIS — F32.A DEPRESSION, UNSPECIFIED DEPRESSION TYPE: ICD-10-CM

## 2023-01-01 DIAGNOSIS — R54 FRAILTY SYNDROME IN GERIATRIC PATIENT: Primary | ICD-10-CM

## 2023-01-01 DIAGNOSIS — R19.7 DIARRHEA, UNSPECIFIED: ICD-10-CM

## 2023-01-01 DIAGNOSIS — S80.812A ABRASION OF LEFT LOWER EXTREMITY, INITIAL ENCOUNTER: ICD-10-CM

## 2023-01-01 DIAGNOSIS — F01.A0 MILD MAJOR NEUROCOGNITIVE DISORDER DUE TO VASCULAR DISEASE WITHOUT BEHAVIORAL DISTURBANCE: ICD-10-CM

## 2023-01-01 DIAGNOSIS — E55.9 VITAMIN D DEFICIENCY: ICD-10-CM

## 2023-01-01 LAB
25(OH)D3 SERPL-MCNC: 31.7 NG/ML
ALBUMIN SERPL BCP-MCNC: 3.5 G/DL (ref 3.5–5)
ALBUMIN SERPL BCP-MCNC: 3.5 G/DL (ref 3.5–5)
ALBUMIN SERPL BCP-MCNC: 4 G/DL (ref 3.5–5)
ALBUMIN/GLOB SERPL: 1 {RATIO}
ALBUMIN/GLOB SERPL: 1.1 {RATIO}
ALBUMIN/GLOB SERPL: 1.1 {RATIO}
ALP SERPL-CCNC: 54 U/L (ref 55–142)
ALP SERPL-CCNC: 56 U/L (ref 55–142)
ALP SERPL-CCNC: 68 U/L (ref 55–142)
ALT SERPL W P-5'-P-CCNC: 19 U/L (ref 13–56)
ALT SERPL W P-5'-P-CCNC: 22 U/L (ref 13–56)
ALT SERPL W P-5'-P-CCNC: 25 U/L (ref 13–56)
ANION GAP SERPL CALCULATED.3IONS-SCNC: 10 MMOL/L (ref 7–16)
ANION GAP SERPL CALCULATED.3IONS-SCNC: 11 MMOL/L (ref 7–16)
ANION GAP SERPL CALCULATED.3IONS-SCNC: 11 MMOL/L (ref 7–16)
ANION GAP SERPL CALCULATED.3IONS-SCNC: 3 MMOL/L (ref 7–16)
ANION GAP SERPL CALCULATED.3IONS-SCNC: 4 MMOL/L (ref 7–16)
ANION GAP SERPL CALCULATED.3IONS-SCNC: 5 MMOL/L (ref 7–16)
ANION GAP SERPL CALCULATED.3IONS-SCNC: 9 MMOL/L (ref 7–16)
AST SERPL W P-5'-P-CCNC: 19 U/L (ref 15–37)
AST SERPL W P-5'-P-CCNC: 22 U/L (ref 15–37)
AST SERPL W P-5'-P-CCNC: 25 U/L (ref 15–37)
BACTERIA #/AREA URNS HPF: ABNORMAL /HPF
BASOPHILS # BLD AUTO: 0.01 K/UL (ref 0–0.2)
BASOPHILS # BLD AUTO: 0.02 K/UL (ref 0–0.2)
BASOPHILS NFR BLD AUTO: 0.1 % (ref 0–1)
BASOPHILS NFR BLD AUTO: 0.2 % (ref 0–1)
BASOPHILS NFR BLD AUTO: 0.4 % (ref 0–1)
BILIRUB SERPL-MCNC: 1.6 MG/DL (ref ?–1.2)
BILIRUB SERPL-MCNC: 1.8 MG/DL (ref ?–1.2)
BILIRUB SERPL-MCNC: 1.9 MG/DL (ref ?–1.2)
BILIRUB SERPL-MCNC: NORMAL MG/DL
BILIRUB UR QL STRIP: NEGATIVE
BLOOD URINE, POC: NORMAL
BUN SERPL-MCNC: 10 MG/DL (ref 7–18)
BUN SERPL-MCNC: 11 MG/DL (ref 7–18)
BUN SERPL-MCNC: 12 MG/DL (ref 7–18)
BUN SERPL-MCNC: 15 MG/DL (ref 7–18)
BUN SERPL-MCNC: 7 MG/DL (ref 7–18)
BUN SERPL-MCNC: 9 MG/DL (ref 7–18)
BUN SERPL-MCNC: 9 MG/DL (ref 7–18)
BUN/CREAT SERPL: 11 (ref 6–20)
BUN/CREAT SERPL: 12 (ref 6–20)
BUN/CREAT SERPL: 12 (ref 6–20)
BUN/CREAT SERPL: 15 (ref 6–20)
BUN/CREAT SERPL: 18 (ref 6–20)
BUN/CREAT SERPL: 21 (ref 6–20)
BUN/CREAT SERPL: 23 (ref 6–20)
BUN/CREAT SERPL: 25 (ref 6–20)
BUN/CREAT SERPL: 28 (ref 6–20)
C COLI+JEJ+UPSA DNA STL QL NAA+NON-PROBE: NEGATIVE
C DIFF TOX A+B STL IA-ACNC: NEGATIVE
CALCIUM SERPL-MCNC: 8.2 MG/DL (ref 8.5–10.1)
CALCIUM SERPL-MCNC: 8.3 MG/DL (ref 8.5–10.1)
CALCIUM SERPL-MCNC: 8.5 MG/DL (ref 8.5–10.1)
CALCIUM SERPL-MCNC: 8.6 MG/DL (ref 8.5–10.1)
CALCIUM SERPL-MCNC: 8.7 MG/DL (ref 8.5–10.1)
CALCIUM SERPL-MCNC: 9 MG/DL (ref 8.5–10.1)
CALCIUM SERPL-MCNC: 9.1 MG/DL (ref 8.5–10.1)
CALCIUM SERPL-MCNC: 9.1 MG/DL (ref 8.5–10.1)
CALCIUM SERPL-MCNC: 9.3 MG/DL (ref 8.5–10.1)
CAOX CRY URNS QL MICRO: ABNORMAL /HPF
CHLORIDE SERPL-SCNC: 100 MMOL/L (ref 98–107)
CHLORIDE SERPL-SCNC: 103 MMOL/L (ref 98–107)
CHLORIDE SERPL-SCNC: 105 MMOL/L (ref 98–107)
CHLORIDE SERPL-SCNC: 92 MMOL/L (ref 98–107)
CHLORIDE SERPL-SCNC: 98 MMOL/L (ref 98–107)
CHOLEST SERPL-MCNC: 117 MG/DL (ref 0–200)
CHOLEST/HDLC SERPL: 1.8 {RATIO}
CLARITY UR: ABNORMAL
CLARITY UR: ABNORMAL
CLARITY UR: CLEAR
CLARITY UR: CLEAR
CO2 SERPL-SCNC: 29 MMOL/L (ref 21–32)
CO2 SERPL-SCNC: 31 MMOL/L (ref 21–32)
CO2 SERPL-SCNC: 31 MMOL/L (ref 21–32)
CO2 SERPL-SCNC: 37 MMOL/L (ref 21–32)
CO2 SERPL-SCNC: 40 MMOL/L (ref 21–32)
CO2 SERPL-SCNC: 41 MMOL/L (ref 21–32)
CO2 SERPL-SCNC: 42 MMOL/L (ref 21–32)
COLOR UR: YELLOW
COLOR, POC UA: NORMAL
CREAT SERPL-MCNC: 0.32 MG/DL (ref 0.55–1.02)
CREAT SERPL-MCNC: 0.57 MG/DL (ref 0.55–1.02)
CREAT SERPL-MCNC: 0.59 MG/DL (ref 0.55–1.02)
CREAT SERPL-MCNC: 0.63 MG/DL (ref 0.55–1.02)
CREAT SERPL-MCNC: 0.65 MG/DL (ref 0.55–1.02)
CREAT SERPL-MCNC: 0.74 MG/DL (ref 0.55–1.02)
CREAT SERPL-MCNC: 0.77 MG/DL (ref 0.55–1.02)
CREAT SERPL-MCNC: 0.83 MG/DL (ref 0.55–1.02)
CREAT SERPL-MCNC: 0.86 MG/DL (ref 0.55–1.02)
DIFFERENTIAL METHOD BLD: ABNORMAL
E COLI SXT1 STL QL IA: NEGATIVE
E COLI SXT2 STL QL IA: NEGATIVE
EGFR (NO RACE VARIABLE) (RUSH/TITUS): 64 ML/MIN/1.73M²
EGFR (NO RACE VARIABLE) (RUSH/TITUS): 67 ML/MIN/1.73M²
EGFR (NO RACE VARIABLE) (RUSH/TITUS): 73 ML/MIN/1.73M²
EGFR (NO RACE VARIABLE) (RUSH/TITUS): 77 ML/MIN/1.73M2
EGFR (NO RACE VARIABLE) (RUSH/TITUS): 84 ML/MIN/1.73M2
EGFR (NO RACE VARIABLE) (RUSH/TITUS): 84 ML/MIN/1.73M2
EGFR (NO RACE VARIABLE) (RUSH/TITUS): 86 ML/MIN/1.73M2
EGFR (NO RACE VARIABLE) (RUSH/TITUS): 86 ML/MIN/1.73M2
EGFR (NO RACE VARIABLE) (RUSH/TITUS): 99 ML/MIN/1.73M²
EOSINOPHIL # BLD AUTO: 0 K/UL (ref 0–0.5)
EOSINOPHIL # BLD AUTO: 0.01 K/UL (ref 0–0.5)
EOSINOPHIL # BLD AUTO: 0.01 K/UL (ref 0–0.5)
EOSINOPHIL NFR BLD AUTO: 0 % (ref 1–4)
EOSINOPHIL NFR BLD AUTO: 0.1 % (ref 1–4)
EOSINOPHIL NFR BLD AUTO: 0.2 % (ref 1–4)
ERYTHROCYTE [DISTWIDTH] IN BLOOD BY AUTOMATED COUNT: 12.7 % (ref 11.5–14.5)
ERYTHROCYTE [DISTWIDTH] IN BLOOD BY AUTOMATED COUNT: 12.9 % (ref 11.5–14.5)
ERYTHROCYTE [DISTWIDTH] IN BLOOD BY AUTOMATED COUNT: 12.9 % (ref 11.5–14.5)
ERYTHROCYTE [DISTWIDTH] IN BLOOD BY AUTOMATED COUNT: 13 % (ref 11.5–14.5)
ERYTHROCYTE [DISTWIDTH] IN BLOOD BY AUTOMATED COUNT: 13.2 % (ref 11.5–14.5)
ERYTHROCYTE [DISTWIDTH] IN BLOOD BY AUTOMATED COUNT: 13.5 % (ref 11.5–14.5)
GLOBULIN SER-MCNC: 3.3 G/DL (ref 2–4)
GLOBULIN SER-MCNC: 3.4 G/DL (ref 2–4)
GLOBULIN SER-MCNC: 3.6 G/DL (ref 2–4)
GLUCOSE SERPL-MCNC: 101 MG/DL (ref 74–106)
GLUCOSE SERPL-MCNC: 102 MG/DL (ref 74–106)
GLUCOSE SERPL-MCNC: 102 MG/DL (ref 74–106)
GLUCOSE SERPL-MCNC: 105 MG/DL (ref 74–106)
GLUCOSE SERPL-MCNC: 105 MG/DL (ref 74–106)
GLUCOSE SERPL-MCNC: 119 MG/DL (ref 74–106)
GLUCOSE SERPL-MCNC: 90 MG/DL (ref 74–106)
GLUCOSE SERPL-MCNC: 93 MG/DL (ref 74–106)
GLUCOSE SERPL-MCNC: 96 MG/DL (ref 74–106)
GLUCOSE UR QL STRIP: NORMAL
GLUCOSE UR STRIP-MCNC: NEGATIVE MG/DL
GLUCOSE UR STRIP-MCNC: NEGATIVE MG/DL
GLUCOSE UR STRIP-MCNC: NORMAL MG/DL
GLUCOSE UR STRIP-MCNC: NORMAL MG/DL
HCT VFR BLD AUTO: 35.4 % (ref 38–47)
HCT VFR BLD AUTO: 37 % (ref 38–47)
HCT VFR BLD AUTO: 40.8 % (ref 38–47)
HCT VFR BLD AUTO: 42.9 % (ref 38–47)
HCT VFR BLD AUTO: 43.4 % (ref 38–47)
HCT VFR BLD AUTO: 44.9 % (ref 38–47)
HCT VFR BLD AUTO: 46.2 % (ref 38–47)
HCT VFR BLD AUTO: 48.2 % (ref 38–47)
HDLC SERPL-MCNC: 66 MG/DL (ref 40–60)
HGB BLD-MCNC: 11.6 G/DL (ref 12–16)
HGB BLD-MCNC: 11.9 G/DL (ref 12–16)
HGB BLD-MCNC: 13.3 G/DL (ref 12–16)
HGB BLD-MCNC: 14.3 G/DL (ref 12–16)
HGB BLD-MCNC: 14.3 G/DL (ref 12–16)
HGB BLD-MCNC: 15.8 G/DL (ref 12–16)
HGB BLD-MCNC: 16.1 G/DL (ref 12–16)
HGB BLD-MCNC: 16.8 G/DL (ref 12–16)
IMM GRANULOCYTES # BLD AUTO: 0.02 K/UL (ref 0–0.04)
IMM GRANULOCYTES NFR BLD: 0.3 % (ref 0–0.4)
KETONES UR QL STRIP: NORMAL
KETONES UR STRIP-SCNC: ABNORMAL MG/DL
KETONES UR STRIP-SCNC: NEGATIVE MG/DL
LDLC SERPL CALC-MCNC: 36 MG/DL
LEUKOCYTE ESTERASE UR QL STRIP: ABNORMAL
LEUKOCYTE ESTERASE URINE, POC: NORMAL
LYMPHOCYTES # BLD AUTO: 1.27 K/UL (ref 1–4.8)
LYMPHOCYTES # BLD AUTO: 1.53 K/UL (ref 1–4.8)
LYMPHOCYTES # BLD AUTO: 1.61 K/UL (ref 1–4.8)
LYMPHOCYTES # BLD AUTO: 1.7 K/UL (ref 1–4.8)
LYMPHOCYTES # BLD AUTO: 1.74 K/UL (ref 1–4.8)
LYMPHOCYTES # BLD AUTO: 1.75 K/UL (ref 1–4.8)
LYMPHOCYTES # BLD AUTO: 1.77 K/UL (ref 1–4.8)
LYMPHOCYTES # BLD AUTO: 1.83 K/UL (ref 1–4.8)
LYMPHOCYTES NFR BLD AUTO: 20.1 % (ref 27–41)
LYMPHOCYTES NFR BLD AUTO: 20.9 % (ref 27–41)
LYMPHOCYTES NFR BLD AUTO: 30.2 % (ref 27–41)
LYMPHOCYTES NFR BLD AUTO: 31.6 % (ref 27–41)
LYMPHOCYTES NFR BLD AUTO: 31.7 % (ref 27–41)
LYMPHOCYTES NFR BLD AUTO: 31.8 % (ref 27–41)
LYMPHOCYTES NFR BLD AUTO: 32.3 % (ref 27–41)
LYMPHOCYTES NFR BLD AUTO: 33.4 % (ref 27–41)
MAGNESIUM SERPL-MCNC: 2 MG/DL (ref 1.7–2.3)
MAGNESIUM SERPL-MCNC: 2.3 MG/DL (ref 1.7–2.3)
MCH RBC QN AUTO: 32.9 PG (ref 27–31)
MCH RBC QN AUTO: 33 PG (ref 27–31)
MCH RBC QN AUTO: 33.2 PG (ref 27–31)
MCH RBC QN AUTO: 33.2 PG (ref 27–31)
MCH RBC QN AUTO: 33.3 PG (ref 27–31)
MCH RBC QN AUTO: 33.4 PG (ref 27–31)
MCH RBC QN AUTO: 33.5 PG (ref 27–31)
MCH RBC QN AUTO: 33.5 PG (ref 27–31)
MCHC RBC AUTO-ENTMCNC: 32.2 G/DL (ref 32–36)
MCHC RBC AUTO-ENTMCNC: 32.6 G/DL (ref 32–36)
MCHC RBC AUTO-ENTMCNC: 32.8 G/DL (ref 32–36)
MCHC RBC AUTO-ENTMCNC: 32.9 G/DL (ref 32–36)
MCHC RBC AUTO-ENTMCNC: 33.3 G/DL (ref 32–36)
MCHC RBC AUTO-ENTMCNC: 34.8 G/DL (ref 32–36)
MCHC RBC AUTO-ENTMCNC: 34.9 G/DL (ref 32–36)
MCHC RBC AUTO-ENTMCNC: 35.2 G/DL (ref 32–36)
MCV RBC AUTO: 100 FL (ref 80–96)
MCV RBC AUTO: 102 FL (ref 80–96)
MCV RBC AUTO: 102 FL (ref 80–96)
MCV RBC AUTO: 103.4 FL (ref 80–96)
MCV RBC AUTO: 95.3 FL (ref 80–96)
MCV RBC AUTO: 95.3 FL (ref 80–96)
MCV RBC AUTO: 96 FL (ref 80–96)
MCV RBC AUTO: 99.1 FL (ref 80–96)
MONOCYTES # BLD AUTO: 0.68 K/UL (ref 0–0.8)
MONOCYTES # BLD AUTO: 0.74 K/UL (ref 0–0.8)
MONOCYTES # BLD AUTO: 0.76 K/UL (ref 0–0.8)
MONOCYTES # BLD AUTO: 0.8 K/UL (ref 0–0.8)
MONOCYTES # BLD AUTO: 0.81 K/UL (ref 0–0.8)
MONOCYTES # BLD AUTO: 0.81 K/UL (ref 0–0.8)
MONOCYTES # BLD AUTO: 0.82 K/UL (ref 0–0.8)
MONOCYTES # BLD AUTO: 0.97 K/UL (ref 0–0.8)
MONOCYTES NFR BLD AUTO: 11.1 % (ref 2–6)
MONOCYTES NFR BLD AUTO: 12.7 % (ref 2–6)
MONOCYTES NFR BLD AUTO: 13 % (ref 2–6)
MONOCYTES NFR BLD AUTO: 13.1 % (ref 2–6)
MONOCYTES NFR BLD AUTO: 14.4 % (ref 2–6)
MONOCYTES NFR BLD AUTO: 14.9 % (ref 2–6)
MONOCYTES NFR BLD AUTO: 15 % (ref 2–6)
MONOCYTES NFR BLD AUTO: 17.4 % (ref 2–6)
MPC BLD CALC-MCNC: 10 FL (ref 9.4–12.4)
MPC BLD CALC-MCNC: 10 FL (ref 9.4–12.4)
MPC BLD CALC-MCNC: 10.3 FL (ref 9.4–12.4)
MPC BLD CALC-MCNC: 10.4 FL (ref 9.4–12.4)
MPC BLD CALC-MCNC: 10.5 FL (ref 9.4–12.4)
MPC BLD CALC-MCNC: 9.7 FL (ref 9.4–12.4)
MUCOUS, UA: ABNORMAL /LPF
MUCOUS, UA: ABNORMAL /LPF
NEUTROPHILS # BLD AUTO: 2.66 K/UL (ref 1.8–7.7)
NEUTROPHILS # BLD AUTO: 2.8 K/UL (ref 1.8–7.7)
NEUTROPHILS # BLD AUTO: 2.84 K/UL (ref 1.8–7.7)
NEUTROPHILS # BLD AUTO: 2.93 K/UL (ref 1.8–7.7)
NEUTROPHILS # BLD AUTO: 3.08 K/UL (ref 1.8–7.7)
NEUTROPHILS # BLD AUTO: 3.1 K/UL (ref 1.8–7.7)
NEUTROPHILS # BLD AUTO: 4.24 K/UL (ref 1.8–7.7)
NEUTROPHILS # BLD AUTO: 4.94 K/UL (ref 1.8–7.7)
NEUTROPHILS NFR BLD AUTO: 50.7 % (ref 53–65)
NEUTROPHILS NFR BLD AUTO: 52.6 % (ref 53–65)
NEUTROPHILS NFR BLD AUTO: 53.3 % (ref 53–65)
NEUTROPHILS NFR BLD AUTO: 53.4 % (ref 53–65)
NEUTROPHILS NFR BLD AUTO: 54.2 % (ref 53–65)
NEUTROPHILS NFR BLD AUTO: 55 % (ref 53–65)
NEUTROPHILS NFR BLD AUTO: 67 % (ref 53–65)
NEUTROPHILS NFR BLD AUTO: 67.5 % (ref 53–65)
NITRITE UR QL STRIP: NEGATIVE
NITRITE, POC UA: NORMAL
NONHDLC SERPL-MCNC: 51 MG/DL
NOROVIRUS GI+II RNA STL QL NAA+NON-PROBE: NEGATIVE
NRBC # BLD AUTO: 0 X10E3/UL
NRBC, AUTO (.00): 0 %
PH UR STRIP: 5.5 PH UNITS
PH UR STRIP: 7 PH UNITS
PH, POC UA: 7
PLATELET # BLD AUTO: 100 K/UL (ref 150–400)
PLATELET # BLD AUTO: 108 K/UL (ref 150–400)
PLATELET # BLD AUTO: 117 K/UL (ref 150–400)
PLATELET # BLD AUTO: 141 K/UL (ref 150–400)
PLATELET # BLD AUTO: 144 K/UL (ref 150–400)
PLATELET # BLD AUTO: 145 K/UL (ref 150–400)
PLATELET # BLD AUTO: 191 K/UL (ref 150–400)
PLATELET # BLD AUTO: 94 K/UL (ref 150–400)
POTASSIUM SERPL-SCNC: 3.6 MMOL/L (ref 3.5–5.1)
POTASSIUM SERPL-SCNC: 3.9 MMOL/L (ref 3.5–5.1)
POTASSIUM SERPL-SCNC: 4 MMOL/L (ref 3.5–5.1)
POTASSIUM SERPL-SCNC: 4.2 MMOL/L (ref 3.5–5.1)
POTASSIUM SERPL-SCNC: 4.3 MMOL/L (ref 3.5–5.1)
POTASSIUM SERPL-SCNC: 4.3 MMOL/L (ref 3.5–5.1)
POTASSIUM SERPL-SCNC: 4.4 MMOL/L (ref 3.5–5.1)
POTASSIUM SERPL-SCNC: 4.6 MMOL/L (ref 3.5–5.1)
POTASSIUM SERPL-SCNC: 5 MMOL/L (ref 3.5–5.1)
PROT SERPL-MCNC: 6.8 G/DL (ref 6.4–8.2)
PROT SERPL-MCNC: 6.9 G/DL (ref 6.4–8.2)
PROT SERPL-MCNC: 7.6 G/DL (ref 6.4–8.2)
PROT UR QL STRIP: 20
PROT UR QL STRIP: 20
PROT UR QL STRIP: NEGATIVE
PROT UR QL STRIP: NEGATIVE
PROTEIN, POC: 30
RBC # BLD AUTO: 3.47 M/UL (ref 4.2–5.4)
RBC # BLD AUTO: 3.58 M/UL (ref 4.2–5.4)
RBC # BLD AUTO: 4 M/UL (ref 4.2–5.4)
RBC # BLD AUTO: 4.33 M/UL (ref 4.2–5.4)
RBC # BLD AUTO: 4.34 M/UL (ref 4.2–5.4)
RBC # BLD AUTO: 4.71 M/UL (ref 4.2–5.4)
RBC # BLD AUTO: 4.81 M/UL (ref 4.2–5.4)
RBC # BLD AUTO: 5.06 M/UL (ref 4.2–5.4)
RBC # UR STRIP: ABNORMAL /UL
RBC # UR STRIP: ABNORMAL /UL
RBC # UR STRIP: NEGATIVE /UL
RBC # UR STRIP: NEGATIVE /UL
RBC #/AREA URNS HPF: 3 /HPF
RBC #/AREA URNS HPF: 68 /HPF
RBC #/AREA URNS HPF: ABNORMAL /HPF
RBC #/AREA URNS HPF: ABNORMAL /HPF
RENAL EPI CELLS #/AREA UR COMP ASSIST: ABNORMAL /HPF
RVA RNA STL QL NAA+NON-PROBE: NEGATIVE
S ENT+BONG DNA STL QL NAA+NON-PROBE: NEGATIVE
SHIGELLA SPECIES NAT: NEGATIVE
SODIUM SERPL-SCNC: 127 MMOL/L (ref 136–145)
SODIUM SERPL-SCNC: 134 MMOL/L (ref 136–145)
SODIUM SERPL-SCNC: 136 MMOL/L (ref 136–145)
SODIUM SERPL-SCNC: 138 MMOL/L (ref 136–145)
SODIUM SERPL-SCNC: 139 MMOL/L (ref 136–145)
SODIUM SERPL-SCNC: 140 MMOL/L (ref 136–145)
SODIUM SERPL-SCNC: 140 MMOL/L (ref 136–145)
SP GR UR STRIP: 1.01
SP GR UR STRIP: 1.02
SPECIFIC GRAVITY, POC UA: 1.02
SQUAMOUS #/AREA URNS LPF: ABNORMAL /HPF
SQUAMOUS #/AREA URNS LPF: ABNORMAL /HPF
SQUAMOUS #/AREA URNS LPF: ABNORMAL /LPF
SQUAMOUS #/AREA URNS LPF: ABNORMAL /LPF
TRIGL SERPL-MCNC: 76 MG/DL (ref 35–150)
UA COMPLETE W REFLEX CULTURE PNL UR: ABNORMAL
UROBILINOGEN UR STRIP-ACNC: 0.2 MG/DL
UROBILINOGEN UR STRIP-ACNC: 0.2 MG/DL
UROBILINOGEN UR STRIP-ACNC: NORMAL MG/DL
UROBILINOGEN UR STRIP-ACNC: NORMAL MG/DL
UROBILINOGEN, POC UA: 0.2
V CHOL+PARA+VUL DNA STL QL NAA+NON-PROBE: NEGATIVE
VLDLC SERPL-MCNC: 15 MG/DL
WBC # BLD AUTO: 5.06 K/UL (ref 4.5–11)
WBC # BLD AUTO: 5.24 K/UL (ref 4.5–11)
WBC # BLD AUTO: 5.5 K/UL (ref 4.5–11)
WBC # BLD AUTO: 5.59 K/UL (ref 4.5–11)
WBC # BLD AUTO: 5.63 K/UL (ref 4.5–11)
WBC # BLD AUTO: 5.67 K/UL (ref 4.5–11)
WBC # BLD AUTO: 6.32 K/UL (ref 4.5–11)
WBC # BLD AUTO: 7.32 K/UL (ref 4.5–11)
WBC #/AREA URNS HPF: 45 /HPF
WBC #/AREA URNS HPF: >182 /HPF
WBC #/AREA URNS HPF: ABNORMAL /HPF
WBC #/AREA URNS HPF: ABNORMAL /HPF
WBC CLUMPS, UA: ABNORMAL /HPF
Y ENTEROCOL DNA STL QL NAA+NON-PROBE: NEGATIVE

## 2023-01-01 PROCEDURE — 27000221 HC OXYGEN, UP TO 24 HOURS

## 2023-01-01 PROCEDURE — 80053 COMPREHEN METABOLIC PANEL: CPT | Mod: ,,, | Performed by: CLINICAL MEDICAL LABORATORY

## 2023-01-01 PROCEDURE — 25000003 PHARM REV CODE 250: Performed by: NURSE PRACTITIONER

## 2023-01-01 PROCEDURE — 83735 ASSAY OF MAGNESIUM: CPT | Performed by: NURSE PRACTITIONER

## 2023-01-01 PROCEDURE — 90471 PR IMMUNIZ ADMIN,1 SINGLE/COMB VAC/TOXOID: ICD-10-PCS | Mod: AT,,, | Performed by: NURSE PRACTITIONER

## 2023-01-01 PROCEDURE — 80048 BASIC METABOLIC PNL TOTAL CA: CPT

## 2023-01-01 PROCEDURE — 97110 THERAPEUTIC EXERCISES: CPT

## 2023-01-01 PROCEDURE — 97166 OT EVAL MOD COMPLEX 45 MIN: CPT

## 2023-01-01 PROCEDURE — 97535 SELF CARE MNGMENT TRAINING: CPT

## 2023-01-01 PROCEDURE — 11000004 HC SNF PRIVATE

## 2023-01-01 PROCEDURE — 97530 THERAPEUTIC ACTIVITIES: CPT

## 2023-01-01 PROCEDURE — 25000003 PHARM REV CODE 250: Performed by: HOSPITALIST

## 2023-01-01 PROCEDURE — 80048 BASIC METABOLIC PNL TOTAL CA: CPT | Performed by: NURSE PRACTITIONER

## 2023-01-01 PROCEDURE — 81001 URINALYSIS AUTO W/SCOPE: CPT | Performed by: NURSE PRACTITIONER

## 2023-01-01 PROCEDURE — 90853 GROUP PSYCHOTHERAPY: CPT

## 2023-01-01 PROCEDURE — 81001 URINALYSIS AUTO W/SCOPE: CPT | Mod: ,,, | Performed by: CLINICAL MEDICAL LABORATORY

## 2023-01-01 PROCEDURE — 87493 C DIFF AMPLIFIED PROBE: CPT

## 2023-01-01 PROCEDURE — 99307 PR NURSING FAC CARE, SUBSEQ, IMPROVING: ICD-10-PCS | Mod: ,,, | Performed by: HOSPITALIST

## 2023-01-01 PROCEDURE — 99900035 HC TECH TIME PER 15 MIN (STAT)

## 2023-01-01 PROCEDURE — 99316 NF DSCHRG MGMT 30 MIN+: CPT | Mod: ,,, | Performed by: HOSPITALIST

## 2023-01-01 PROCEDURE — G0439 PR MEDICARE ANNUAL WELLNESS SUBSEQUENT VISIT: ICD-10-PCS | Mod: ,,, | Performed by: NURSE PRACTITIONER

## 2023-01-01 PROCEDURE — 97110 THERAPEUTIC EXERCISES: CPT | Mod: CQ

## 2023-01-01 PROCEDURE — 27000958

## 2023-01-01 PROCEDURE — 94761 N-INVAS EAR/PLS OXIMETRY MLT: CPT

## 2023-01-01 PROCEDURE — 87077 CULTURE AEROBIC IDENTIFY: CPT | Performed by: NURSE PRACTITIONER

## 2023-01-01 PROCEDURE — 80061 LIPID PANEL: CPT | Mod: ,,, | Performed by: CLINICAL MEDICAL LABORATORY

## 2023-01-01 PROCEDURE — G0378 HOSPITAL OBSERVATION PER HR: HCPCS

## 2023-01-01 PROCEDURE — 97116 GAIT TRAINING THERAPY: CPT

## 2023-01-01 PROCEDURE — 90715 PR TDAP VACCINE >7 YO, IM: ICD-10-PCS | Mod: AT,,, | Performed by: NURSE PRACTITIONER

## 2023-01-01 PROCEDURE — 99308 PR NURSING FAC CARE, SUBSEQ, MINOR COMPLIC: ICD-10-PCS | Mod: ,,, | Performed by: HOSPITALIST

## 2023-01-01 PROCEDURE — 85025 COMPLETE CBC W/AUTO DIFF WBC: CPT | Performed by: NURSE PRACTITIONER

## 2023-01-01 PROCEDURE — A4216 STERILE WATER/SALINE, 10 ML: HCPCS | Performed by: NURSE PRACTITIONER

## 2023-01-01 PROCEDURE — 96365 THER/PROPH/DIAG IV INF INIT: CPT | Mod: 59

## 2023-01-01 PROCEDURE — 25000003 PHARM REV CODE 250

## 2023-01-01 PROCEDURE — 97116 GAIT TRAINING THERAPY: CPT | Mod: CQ

## 2023-01-01 PROCEDURE — 82306 VITAMIN D: ICD-10-PCS | Mod: ,,, | Performed by: CLINICAL MEDICAL LABORATORY

## 2023-01-01 PROCEDURE — 99496 TRANSITIONAL CARE MANAGE SERVICE 7 DAY DISCHARGE: ICD-10-PCS | Mod: ,,, | Performed by: STUDENT IN AN ORGANIZED HEALTH CARE EDUCATION/TRAINING PROGRAM

## 2023-01-01 PROCEDURE — 93005 ELECTROCARDIOGRAM TRACING: CPT | Mod: RHCUB | Performed by: STUDENT IN AN ORGANIZED HEALTH CARE EDUCATION/TRAINING PROGRAM

## 2023-01-01 PROCEDURE — 83735 MAGNESIUM: ICD-10-PCS | Mod: ,,, | Performed by: CLINICAL MEDICAL LABORATORY

## 2023-01-01 PROCEDURE — 81003 URINALYSIS AUTO W/O SCOPE: CPT | Mod: RHCUB | Performed by: STUDENT IN AN ORGANIZED HEALTH CARE EDUCATION/TRAINING PROGRAM

## 2023-01-01 PROCEDURE — 85025 COMPLETE CBC W/AUTO DIFF WBC: CPT

## 2023-01-01 PROCEDURE — 87086 URINE CULTURE/COLONY COUNT: CPT | Performed by: NURSE PRACTITIONER

## 2023-01-01 PROCEDURE — 99900039 HC SLP GENERIC THERAPY SCREENING (STAT)

## 2023-01-01 PROCEDURE — 99417 PROLNG OP E/M EACH 15 MIN: CPT | Mod: ,,, | Performed by: STUDENT IN AN ORGANIZED HEALTH CARE EDUCATION/TRAINING PROGRAM

## 2023-01-01 PROCEDURE — 82306 VITAMIN D 25 HYDROXY: CPT | Mod: ,,, | Performed by: CLINICAL MEDICAL LABORATORY

## 2023-01-01 PROCEDURE — 80053 COMPREHEN METABOLIC PANEL: CPT | Performed by: NURSE PRACTITIONER

## 2023-01-01 PROCEDURE — 85025 COMPLETE CBC W/AUTO DIFF WBC: CPT | Mod: ,,, | Performed by: CLINICAL MEDICAL LABORATORY

## 2023-01-01 PROCEDURE — 87077 CULTURE, URINE: ICD-10-PCS | Mod: ,,, | Performed by: CLINICAL MEDICAL LABORATORY

## 2023-01-01 PROCEDURE — 93010 EKG 12-LEAD: ICD-10-PCS | Mod: ,,, | Performed by: INTERNAL MEDICINE

## 2023-01-01 PROCEDURE — 87186 SC STD MICRODIL/AGAR DIL: CPT | Mod: ,,, | Performed by: CLINICAL MEDICAL LABORATORY

## 2023-01-01 PROCEDURE — 87086 URINE CULTURE/COLONY COUNT: CPT | Mod: ,,, | Performed by: CLINICAL MEDICAL LABORATORY

## 2023-01-01 PROCEDURE — 96375 TX/PRO/DX INJ NEW DRUG ADDON: CPT

## 2023-01-01 PROCEDURE — 93010 ELECTROCARDIOGRAM REPORT: CPT | Mod: ,,, | Performed by: INTERNAL MEDICINE

## 2023-01-01 PROCEDURE — 99285 EMERGENCY DEPT VISIT HI MDM: CPT | Mod: EDII,,, | Performed by: NURSE PRACTITIONER

## 2023-01-01 PROCEDURE — 99285 EMERGENCY DEPT VISIT HI MDM: CPT | Mod: 25

## 2023-01-01 PROCEDURE — 80061 LIPID PANEL: ICD-10-PCS | Mod: ,,, | Performed by: CLINICAL MEDICAL LABORATORY

## 2023-01-01 PROCEDURE — 99215 OFFICE O/P EST HI 40 MIN: CPT | Mod: ,,, | Performed by: STUDENT IN AN ORGANIZED HEALTH CARE EDUCATION/TRAINING PROGRAM

## 2023-01-01 PROCEDURE — 99308 SBSQ NF CARE LOW MDM 20: CPT | Mod: ,,, | Performed by: HOSPITALIST

## 2023-01-01 PROCEDURE — 99496 TRANSJ CARE MGMT HIGH F2F 7D: CPT | Mod: ,,, | Performed by: STUDENT IN AN ORGANIZED HEALTH CARE EDUCATION/TRAINING PROGRAM

## 2023-01-01 PROCEDURE — 87077 CULTURE AEROBIC IDENTIFY: CPT | Mod: ,,, | Performed by: CLINICAL MEDICAL LABORATORY

## 2023-01-01 PROCEDURE — 99305 PR NURSING FACILITY CARE, INIT, MOD SEVERITY: ICD-10-PCS | Mod: AI,,, | Performed by: HOSPITALIST

## 2023-01-01 PROCEDURE — 80053 COMPREHENSIVE METABOLIC PANEL: ICD-10-PCS | Mod: ,,, | Performed by: CLINICAL MEDICAL LABORATORY

## 2023-01-01 PROCEDURE — 99238 HOSP IP/OBS DSCHRG MGMT 30/<: CPT | Mod: ,,, | Performed by: HOSPITALIST

## 2023-01-01 PROCEDURE — 99495 TCM SERVICES (MODERATE COMPLEXITY): ICD-10-PCS | Mod: ,,, | Performed by: NURSE PRACTITIONER

## 2023-01-01 PROCEDURE — 87077 CULTURE AEROBIC IDENTIFY: CPT | Performed by: HOSPITALIST

## 2023-01-01 PROCEDURE — 99417 PR PROLONGED SVC, OUTPT, W/WO DIRECT PT CONTACT,  EA ADDTL 15 MIN: ICD-10-PCS | Mod: ,,, | Performed by: STUDENT IN AN ORGANIZED HEALTH CARE EDUCATION/TRAINING PROGRAM

## 2023-01-01 PROCEDURE — 90715 TDAP VACCINE 7 YRS/> IM: CPT | Mod: AT,,, | Performed by: NURSE PRACTITIONER

## 2023-01-01 PROCEDURE — 97530 THERAPEUTIC ACTIVITIES: CPT | Mod: CQ

## 2023-01-01 PROCEDURE — 99495 TRANSJ CARE MGMT MOD F2F 14D: CPT | Mod: ,,, | Performed by: NURSE PRACTITIONER

## 2023-01-01 PROCEDURE — 99238 PR HOSPITAL DISCHARGE DAY,<30 MIN: ICD-10-PCS | Mod: ,,, | Performed by: HOSPITALIST

## 2023-01-01 PROCEDURE — 87086 CULTURE, URINE: ICD-10-PCS | Mod: ,,, | Performed by: CLINICAL MEDICAL LABORATORY

## 2023-01-01 PROCEDURE — G0439 PPPS, SUBSEQ VISIT: HCPCS | Mod: ,,, | Performed by: NURSE PRACTITIONER

## 2023-01-01 PROCEDURE — 99215 PR OFFICE/OUTPT VISIT, EST, LEVL V, 40-54 MIN: ICD-10-PCS | Mod: ,,, | Performed by: STUDENT IN AN ORGANIZED HEALTH CARE EDUCATION/TRAINING PROGRAM

## 2023-01-01 PROCEDURE — 99307 SBSQ NF CARE SF MDM 10: CPT | Mod: ,,, | Performed by: HOSPITALIST

## 2023-01-01 PROCEDURE — 87045 FECES CULTURE AEROBIC BACT: CPT

## 2023-01-01 PROCEDURE — 96374 THER/PROPH/DIAG INJ IV PUSH: CPT

## 2023-01-01 PROCEDURE — 90471 IMMUNIZATION ADMIN: CPT | Mod: AT,,, | Performed by: NURSE PRACTITIONER

## 2023-01-01 PROCEDURE — 96365 THER/PROPH/DIAG IV INF INIT: CPT

## 2023-01-01 PROCEDURE — 99214 PR OFFICE/OUTPT VISIT, EST, LEVL IV, 30-39 MIN: ICD-10-PCS | Mod: ,,, | Performed by: STUDENT IN AN ORGANIZED HEALTH CARE EDUCATION/TRAINING PROGRAM

## 2023-01-01 PROCEDURE — 93005 ELECTROCARDIOGRAM TRACING: CPT

## 2023-01-01 PROCEDURE — 87186 CULTURE, URINE: ICD-10-PCS | Mod: ,,, | Performed by: CLINICAL MEDICAL LABORATORY

## 2023-01-01 PROCEDURE — 83735 ASSAY OF MAGNESIUM: CPT | Mod: ,,, | Performed by: CLINICAL MEDICAL LABORATORY

## 2023-01-01 PROCEDURE — 99305 1ST NF CARE MODERATE MDM 35: CPT | Mod: AI,,, | Performed by: HOSPITALIST

## 2023-01-01 PROCEDURE — 97163 PT EVAL HIGH COMPLEX 45 MIN: CPT

## 2023-01-01 PROCEDURE — 99285 PR EMERGENCY DEPT VISIT,LEVEL V: ICD-10-PCS | Mod: EDII,,, | Performed by: NURSE PRACTITIONER

## 2023-01-01 PROCEDURE — 81001 URINALYSIS AUTO W/SCOPE: CPT | Performed by: HOSPITALIST

## 2023-01-01 PROCEDURE — 63600175 PHARM REV CODE 636 W HCPCS: Performed by: NURSE PRACTITIONER

## 2023-01-01 PROCEDURE — 85025 CBC WITH DIFFERENTIAL: ICD-10-PCS | Mod: ,,, | Performed by: CLINICAL MEDICAL LABORATORY

## 2023-01-01 PROCEDURE — 99214 OFFICE O/P EST MOD 30 MIN: CPT | Mod: ,,, | Performed by: STUDENT IN AN ORGANIZED HEALTH CARE EDUCATION/TRAINING PROGRAM

## 2023-01-01 PROCEDURE — 87506 IADNA-DNA/RNA PROBE TQ 6-11: CPT

## 2023-01-01 PROCEDURE — 93010 ELECTROCARDIOGRAM REPORT: CPT | Mod: ,,, | Performed by: STUDENT IN AN ORGANIZED HEALTH CARE EDUCATION/TRAINING PROGRAM

## 2023-01-01 PROCEDURE — 99222 PR INITIAL HOSPITAL CARE,LEVL II: ICD-10-PCS | Mod: AI,,, | Performed by: HOSPITALIST

## 2023-01-01 PROCEDURE — 99222 1ST HOSP IP/OBS MODERATE 55: CPT | Mod: AI,,, | Performed by: HOSPITALIST

## 2023-01-01 PROCEDURE — 87086 URINE CULTURE/COLONY COUNT: CPT | Performed by: HOSPITALIST

## 2023-01-01 PROCEDURE — 81001 URINALYSIS, REFLEX TO URINE CULTURE: ICD-10-PCS | Mod: ,,, | Performed by: CLINICAL MEDICAL LABORATORY

## 2023-01-01 PROCEDURE — 93010 PR ELECTROCARDIOGRAM REPORT: ICD-10-PCS | Mod: ,,, | Performed by: STUDENT IN AN ORGANIZED HEALTH CARE EDUCATION/TRAINING PROGRAM

## 2023-01-01 PROCEDURE — 81001 URINALYSIS: ICD-10-PCS | Mod: ,,, | Performed by: CLINICAL MEDICAL LABORATORY

## 2023-01-01 PROCEDURE — 11000001 HC ACUTE MED/SURG PRIVATE ROOM

## 2023-01-01 PROCEDURE — 99316 PR NURSING FAC DISCHRGE DAY,MORE 30 MIN: ICD-10-PCS | Mod: ,,, | Performed by: HOSPITALIST

## 2023-01-01 RX ORDER — METOPROLOL TARTRATE 25 MG/1
25 TABLET, FILM COATED ORAL 2 TIMES DAILY
Status: DISCONTINUED | OUTPATIENT
Start: 2023-01-01 | End: 2023-01-01 | Stop reason: HOSPADM

## 2023-01-01 RX ORDER — FAMOTIDINE 20 MG/1
20 TABLET, FILM COATED ORAL DAILY
Status: DISCONTINUED | OUTPATIENT
Start: 2023-01-01 | End: 2023-01-01 | Stop reason: HOSPADM

## 2023-01-01 RX ORDER — SODIUM CHLORIDE 0.9 % (FLUSH) 0.9 %
10 SYRINGE (ML) INJECTION EVERY 12 HOURS
Status: DISCONTINUED | OUTPATIENT
Start: 2023-01-01 | End: 2023-01-01 | Stop reason: HOSPADM

## 2023-01-01 RX ORDER — ATORVASTATIN CALCIUM 80 MG/1
80 TABLET, FILM COATED ORAL DAILY
Status: DISCONTINUED | OUTPATIENT
Start: 2023-01-01 | End: 2023-01-01 | Stop reason: HOSPADM

## 2023-01-01 RX ORDER — HYDRALAZINE HYDROCHLORIDE 20 MG/ML
5 INJECTION INTRAMUSCULAR; INTRAVENOUS
Status: COMPLETED | OUTPATIENT
Start: 2023-01-01 | End: 2023-01-01

## 2023-01-01 RX ORDER — CLOPIDOGREL BISULFATE 75 MG/1
75 TABLET ORAL DAILY
Status: DISCONTINUED | OUTPATIENT
Start: 2023-01-01 | End: 2023-01-01 | Stop reason: HOSPADM

## 2023-01-01 RX ORDER — TRAZODONE HYDROCHLORIDE 100 MG/1
100 TABLET ORAL NIGHTLY
Status: DISCONTINUED | OUTPATIENT
Start: 2023-01-01 | End: 2023-01-01 | Stop reason: HOSPADM

## 2023-01-01 RX ORDER — TRAZODONE HYDROCHLORIDE 100 MG/1
100 TABLET ORAL NIGHTLY
COMMUNITY

## 2023-01-01 RX ORDER — ACETAMINOPHEN 325 MG/1
650 TABLET ORAL EVERY 6 HOURS PRN
Status: DISCONTINUED | OUTPATIENT
Start: 2023-01-01 | End: 2023-01-01 | Stop reason: HOSPADM

## 2023-01-01 RX ORDER — MELATONIN 5 MG
5 CAPSULE ORAL NIGHTLY PRN
COMMUNITY
End: 2023-01-01 | Stop reason: SDUPTHER

## 2023-01-01 RX ORDER — FAMOTIDINE 20 MG/1
20 TABLET, FILM COATED ORAL DAILY
Qty: 90 TABLET | Refills: 2 | Status: SHIPPED | OUTPATIENT
Start: 2023-01-01

## 2023-01-01 RX ORDER — TALC
250 POWDER (GRAM) TOPICAL DAILY
COMMUNITY

## 2023-01-01 RX ORDER — AMLODIPINE BESYLATE 2.5 MG/1
2.5 TABLET ORAL DAILY
Status: DISCONTINUED | OUTPATIENT
Start: 2023-01-01 | End: 2023-01-01

## 2023-01-01 RX ORDER — LOSARTAN POTASSIUM 50 MG/1
50 TABLET ORAL DAILY
Status: DISCONTINUED | OUTPATIENT
Start: 2023-01-01 | End: 2023-01-01

## 2023-01-01 RX ORDER — TALC
6 POWDER (GRAM) TOPICAL NIGHTLY PRN
Status: DISCONTINUED | OUTPATIENT
Start: 2023-01-01 | End: 2023-01-01 | Stop reason: HOSPADM

## 2023-01-01 RX ORDER — ACETAMINOPHEN 325 MG/1
650 TABLET ORAL EVERY 4 HOURS PRN
Status: DISCONTINUED | OUTPATIENT
Start: 2023-01-01 | End: 2023-01-01 | Stop reason: HOSPADM

## 2023-01-01 RX ORDER — CLOPIDOGREL BISULFATE 75 MG/1
75 TABLET ORAL DAILY
Qty: 30 TABLET | Refills: 0 | Status: SHIPPED | OUTPATIENT
Start: 2023-01-01 | End: 2023-09-09

## 2023-01-01 RX ORDER — NAPROXEN SODIUM 220 MG/1
81 TABLET, FILM COATED ORAL DAILY
Status: DISCONTINUED | OUTPATIENT
Start: 2023-01-01 | End: 2023-01-01 | Stop reason: HOSPADM

## 2023-01-01 RX ORDER — ENOXAPARIN SODIUM 100 MG/ML
40 INJECTION SUBCUTANEOUS EVERY 24 HOURS
Status: DISCONTINUED | OUTPATIENT
Start: 2023-01-01 | End: 2023-01-01

## 2023-01-01 RX ORDER — OXYCODONE AND ACETAMINOPHEN 5; 325 MG/1; MG/1
1 TABLET ORAL EVERY 4 HOURS PRN
Status: DISCONTINUED | OUTPATIENT
Start: 2023-01-01 | End: 2023-01-01 | Stop reason: HOSPADM

## 2023-01-01 RX ORDER — TALC
250 POWDER (GRAM) TOPICAL DAILY
Status: DISCONTINUED | OUTPATIENT
Start: 2023-01-01 | End: 2023-01-01

## 2023-01-01 RX ORDER — METOPROLOL TARTRATE 25 MG/1
25 TABLET, FILM COATED ORAL 2 TIMES DAILY
Qty: 60 TABLET | Refills: 11 | Status: SHIPPED | OUTPATIENT
Start: 2023-01-01 | End: 2024-04-04

## 2023-01-01 RX ORDER — LANOLIN ALCOHOL/MO/W.PET/CERES
400 CREAM (GRAM) TOPICAL DAILY
Status: DISCONTINUED | OUTPATIENT
Start: 2023-01-01 | End: 2023-01-01 | Stop reason: HOSPADM

## 2023-01-01 RX ORDER — IPRATROPIUM BROMIDE AND ALBUTEROL SULFATE 2.5; .5 MG/3ML; MG/3ML
3 SOLUTION RESPIRATORY (INHALATION) EVERY 6 HOURS PRN
Qty: 75 ML | Refills: 6 | Status: SHIPPED | OUTPATIENT
Start: 2023-01-01 | End: 2024-08-03

## 2023-01-01 RX ORDER — GUAIFENESIN AND DEXTROMETHORPHAN HYDROBROMIDE 1200; 60 MG/1; MG/1
1 TABLET, EXTENDED RELEASE ORAL 2 TIMES DAILY PRN
Qty: 20 TABLET | Refills: 0 | Status: SHIPPED | OUTPATIENT
Start: 2023-01-01 | End: 2023-08-20

## 2023-01-01 RX ORDER — COLESEVELAM 180 1/1
625 TABLET ORAL 2 TIMES DAILY WITH MEALS
COMMUNITY
End: 2023-01-01

## 2023-01-01 RX ORDER — VALSARTAN 80 MG/1
80 TABLET ORAL 2 TIMES DAILY
Status: DISCONTINUED | OUTPATIENT
Start: 2023-01-01 | End: 2023-01-01 | Stop reason: HOSPADM

## 2023-01-01 RX ORDER — VALSARTAN 80 MG/1
80 TABLET ORAL DAILY
Status: DISCONTINUED | OUTPATIENT
Start: 2023-01-01 | End: 2023-01-01 | Stop reason: CLARIF

## 2023-01-01 RX ORDER — GUAIFENESIN 600 MG/1
600 TABLET, EXTENDED RELEASE ORAL 2 TIMES DAILY
Qty: 60 TABLET | Refills: 2 | Status: SHIPPED | OUTPATIENT
Start: 2023-01-01 | End: 2023-01-01

## 2023-01-01 RX ORDER — LOSARTAN POTASSIUM 100 MG/1
100 TABLET ORAL DAILY
Status: DISCONTINUED | OUTPATIENT
Start: 2023-01-01 | End: 2023-01-01 | Stop reason: HOSPADM

## 2023-01-01 RX ORDER — MELATONIN 5 MG
CAPSULE ORAL
Qty: 30 CAPSULE | Refills: 1 | Status: SHIPPED | OUTPATIENT
Start: 2023-01-01

## 2023-01-01 RX ORDER — VALSARTAN 80 MG/1
80 TABLET ORAL DAILY
Status: DISCONTINUED | OUTPATIENT
Start: 2023-01-01 | End: 2023-01-01

## 2023-01-01 RX ORDER — ATORVASTATIN CALCIUM 80 MG/1
80 TABLET, FILM COATED ORAL DAILY
Qty: 90 TABLET | Refills: 3 | Status: SHIPPED | OUTPATIENT
Start: 2023-01-01

## 2023-01-01 RX ORDER — METOPROLOL TARTRATE 50 MG/1
50 TABLET ORAL 2 TIMES DAILY
Status: DISCONTINUED | OUTPATIENT
Start: 2023-01-01 | End: 2023-01-01 | Stop reason: HOSPADM

## 2023-01-01 RX ORDER — AMOXICILLIN 250 MG
1 CAPSULE ORAL 2 TIMES DAILY PRN
Status: DISCONTINUED | OUTPATIENT
Start: 2023-01-01 | End: 2023-01-01 | Stop reason: HOSPADM

## 2023-01-01 RX ORDER — METOPROLOL TARTRATE 25 MG/1
25 TABLET, FILM COATED ORAL 2 TIMES DAILY
Status: DISCONTINUED | OUTPATIENT
Start: 2023-01-01 | End: 2023-01-01

## 2023-01-01 RX ORDER — CALCIUM CARBONATE 200(500)MG
500 TABLET,CHEWABLE ORAL 2 TIMES DAILY PRN
Status: DISCONTINUED | OUTPATIENT
Start: 2023-01-01 | End: 2023-01-01 | Stop reason: HOSPADM

## 2023-01-01 RX ORDER — AMLODIPINE BESYLATE 10 MG/1
10 TABLET ORAL DAILY
Status: DISCONTINUED | OUTPATIENT
Start: 2023-01-01 | End: 2023-01-01

## 2023-01-01 RX ORDER — ATORVASTATIN CALCIUM 80 MG/1
80 TABLET, FILM COATED ORAL DAILY
Status: DISCONTINUED | OUTPATIENT
Start: 2023-01-01 | End: 2023-01-01

## 2023-01-01 RX ORDER — TALC
3 POWDER (GRAM) TOPICAL NIGHTLY
Status: ON HOLD | COMMUNITY
End: 2023-01-01

## 2023-01-01 RX ORDER — MECLIZINE HYDROCHLORIDE 25 MG/1
25 TABLET ORAL 3 TIMES DAILY PRN
COMMUNITY
End: 2023-01-01

## 2023-01-01 RX ORDER — AMLODIPINE BESYLATE 5 MG/1
5 TABLET ORAL DAILY
Status: DISCONTINUED | OUTPATIENT
Start: 2023-01-01 | End: 2023-01-01

## 2023-01-01 RX ORDER — TALC
3 POWDER (GRAM) TOPICAL NIGHTLY
Status: DISCONTINUED | OUTPATIENT
Start: 2023-01-01 | End: 2023-01-01 | Stop reason: HOSPADM

## 2023-01-01 RX ORDER — IBUPROFEN 200 MG
200 TABLET ORAL EVERY 6 HOURS PRN
COMMUNITY
End: 2023-01-01

## 2023-01-01 RX ORDER — ONDANSETRON 2 MG/ML
4 INJECTION INTRAMUSCULAR; INTRAVENOUS EVERY 8 HOURS PRN
Status: DISCONTINUED | OUTPATIENT
Start: 2023-01-01 | End: 2023-01-01 | Stop reason: HOSPADM

## 2023-01-01 RX ORDER — CEPHALEXIN 500 MG/1
500 CAPSULE ORAL EVERY 12 HOURS
Status: COMPLETED | OUTPATIENT
Start: 2023-01-01 | End: 2023-01-01

## 2023-01-01 RX ORDER — CIPROFLOXACIN 500 MG/1
500 TABLET ORAL 2 TIMES DAILY
Qty: 10 TABLET | Refills: 0 | Status: SHIPPED | OUTPATIENT
Start: 2023-01-01 | End: 2023-01-01

## 2023-01-01 RX ORDER — COLESEVELAM 180 1/1
625 TABLET ORAL 2 TIMES DAILY WITH MEALS
Status: DISCONTINUED | OUTPATIENT
Start: 2023-01-01 | End: 2023-01-01 | Stop reason: HOSPADM

## 2023-01-01 RX ORDER — NAPROXEN SODIUM 220 MG/1
81 TABLET, FILM COATED ORAL DAILY
COMMUNITY

## 2023-01-01 RX ORDER — OXYCODONE AND ACETAMINOPHEN 5; 325 MG/1; MG/1
1 TABLET ORAL EVERY 4 HOURS PRN
COMMUNITY
End: 2023-01-01

## 2023-01-01 RX ORDER — LOSARTAN POTASSIUM 50 MG/1
50 TABLET ORAL DAILY
Status: DISCONTINUED | OUTPATIENT
Start: 2023-01-01 | End: 2023-01-01 | Stop reason: HOSPADM

## 2023-01-01 RX ORDER — NITROFURANTOIN 25; 75 MG/1; MG/1
100 CAPSULE ORAL 2 TIMES DAILY
Qty: 10 CAPSULE | Refills: 0 | Status: SHIPPED | OUTPATIENT
Start: 2023-01-01 | End: 2023-01-01

## 2023-01-01 RX ORDER — VALSARTAN 80 MG/1
TABLET ORAL
Qty: 90 TABLET | Refills: 2 | OUTPATIENT
Start: 2023-01-01

## 2023-01-01 RX ADMIN — COLESEVELAM HYDROCHLORIDE 625 MG: 625 TABLET, FILM COATED ORAL at 04:07

## 2023-01-01 RX ADMIN — CLOPIDOGREL BISULFATE 75 MG: 75 TABLET, FILM COATED ORAL at 08:07

## 2023-01-01 RX ADMIN — TRAZODONE HYDROCHLORIDE 100 MG: 100 TABLET ORAL at 08:07

## 2023-01-01 RX ADMIN — ASPIRIN 81 MG CHEWABLE TABLET 81 MG: 81 TABLET CHEWABLE at 08:08

## 2023-01-01 RX ADMIN — CLOPIDOGREL BISULFATE 75 MG: 75 TABLET, FILM COATED ORAL at 08:04

## 2023-01-01 RX ADMIN — LOSARTAN POTASSIUM 100 MG: 100 TABLET, FILM COATED ORAL at 08:07

## 2023-01-01 RX ADMIN — COLESEVELAM HYDROCHLORIDE 625 MG: 625 TABLET, FILM COATED ORAL at 07:07

## 2023-01-01 RX ADMIN — TRAZODONE HYDROCHLORIDE 100 MG: 100 TABLET ORAL at 08:08

## 2023-01-01 RX ADMIN — MELATONIN 6 MG: at 08:07

## 2023-01-01 RX ADMIN — LOSARTAN POTASSIUM 50 MG: 50 TABLET, FILM COATED ORAL at 08:07

## 2023-01-01 RX ADMIN — ASPIRIN 81 MG CHEWABLE TABLET 81 MG: 81 TABLET CHEWABLE at 09:07

## 2023-01-01 RX ADMIN — ACETAMINOPHEN 650 MG: 325 TABLET ORAL at 08:07

## 2023-01-01 RX ADMIN — VALSARTAN 80 MG: 80 TABLET ORAL at 08:04

## 2023-01-01 RX ADMIN — METOPROLOL TARTRATE 25 MG: 25 TABLET, FILM COATED ORAL at 08:04

## 2023-01-01 RX ADMIN — METOPROLOL TARTRATE 50 MG: 50 TABLET, FILM COATED ORAL at 08:08

## 2023-01-01 RX ADMIN — METOPROLOL TARTRATE 50 MG: 50 TABLET, FILM COATED ORAL at 08:07

## 2023-01-01 RX ADMIN — COLESEVELAM HYDROCHLORIDE 625 MG: 625 TABLET, FILM COATED ORAL at 08:07

## 2023-01-01 RX ADMIN — LOSARTAN POTASSIUM 50 MG: 50 TABLET, FILM COATED ORAL at 12:07

## 2023-01-01 RX ADMIN — LOSARTAN POTASSIUM 100 MG: 100 TABLET, FILM COATED ORAL at 08:08

## 2023-01-01 RX ADMIN — ATORVASTATIN CALCIUM 80 MG: 80 TABLET, FILM COATED ORAL at 08:08

## 2023-01-01 RX ADMIN — ATORVASTATIN CALCIUM 80 MG: 80 TABLET, FILM COATED ORAL at 08:07

## 2023-01-01 RX ADMIN — FAMOTIDINE 20 MG: 20 TABLET, FILM COATED ORAL at 08:04

## 2023-01-01 RX ADMIN — CLOPIDOGREL BISULFATE 75 MG: 75 TABLET, FILM COATED ORAL at 08:08

## 2023-01-01 RX ADMIN — ZINC OXIDE TOPICAL OINT.: at 08:07

## 2023-01-01 RX ADMIN — ASPIRIN 81 MG CHEWABLE TABLET 81 MG: 81 TABLET CHEWABLE at 08:07

## 2023-01-01 RX ADMIN — COLESEVELAM HYDROCHLORIDE 625 MG: 625 TABLET, FILM COATED ORAL at 05:07

## 2023-01-01 RX ADMIN — SENNOSIDES AND DOCUSATE SODIUM 1 TABLET: 50; 8.6 TABLET ORAL at 08:07

## 2023-01-01 RX ADMIN — CEFTRIAXONE SODIUM 1 G: 1 INJECTION, POWDER, FOR SOLUTION INTRAMUSCULAR; INTRAVENOUS at 06:04

## 2023-01-01 RX ADMIN — METOPROLOL TARTRATE 25 MG: 25 TABLET, FILM COATED ORAL at 08:07

## 2023-01-01 RX ADMIN — Medication 3 MG: at 08:04

## 2023-01-01 RX ADMIN — OXYCODONE HYDROCHLORIDE AND ACETAMINOPHEN 1 TABLET: 5; 325 TABLET ORAL at 08:08

## 2023-01-01 RX ADMIN — METOPROLOL TARTRATE 25 MG: 25 TABLET, FILM COATED ORAL at 01:04

## 2023-01-01 RX ADMIN — Medication 10 ML: at 09:04

## 2023-01-01 RX ADMIN — ACETAMINOPHEN 650 MG: 325 TABLET ORAL at 05:07

## 2023-01-01 RX ADMIN — COLESEVELAM HYDROCHLORIDE 625 MG: 625 TABLET, FILM COATED ORAL at 04:08

## 2023-01-01 RX ADMIN — AMLODIPINE BESYLATE 10 MG: 10 TABLET ORAL at 08:04

## 2023-01-01 RX ADMIN — COLESEVELAM HYDROCHLORIDE 625 MG: 625 TABLET, FILM COATED ORAL at 08:08

## 2023-01-01 RX ADMIN — OXYCODONE HYDROCHLORIDE AND ACETAMINOPHEN 1 TABLET: 5; 325 TABLET ORAL at 05:07

## 2023-01-01 RX ADMIN — ACETAMINOPHEN 650 MG: 325 TABLET ORAL at 04:07

## 2023-01-01 RX ADMIN — ZINC OXIDE TOPICAL OINT.: at 09:07

## 2023-01-01 RX ADMIN — ASPIRIN 81 MG CHEWABLE TABLET 81 MG: 81 TABLET CHEWABLE at 04:07

## 2023-01-01 RX ADMIN — ONDANSETRON 4 MG: 2 INJECTION INTRAMUSCULAR; INTRAVENOUS at 09:04

## 2023-01-01 RX ADMIN — CEFTRIAXONE SODIUM 1 G: 1 INJECTION, POWDER, FOR SOLUTION INTRAMUSCULAR; INTRAVENOUS at 08:04

## 2023-01-01 RX ADMIN — OXYCODONE HYDROCHLORIDE AND ACETAMINOPHEN 1 TABLET: 5; 325 TABLET ORAL at 12:08

## 2023-01-01 RX ADMIN — ZINC OXIDE TOPICAL OINT.: at 08:08

## 2023-01-01 RX ADMIN — ATORVASTATIN CALCIUM 80 MG: 80 TABLET, FILM COATED ORAL at 09:07

## 2023-01-01 RX ADMIN — CEPHALEXIN 500 MG: 500 CAPSULE ORAL at 08:07

## 2023-01-01 RX ADMIN — Medication 400 MG: at 09:04

## 2023-01-01 RX ADMIN — COLESEVELAM HYDROCHLORIDE 625 MG: 625 TABLET, FILM COATED ORAL at 09:07

## 2023-01-01 RX ADMIN — Medication 400 MG: at 08:04

## 2023-01-01 RX ADMIN — ACETAMINOPHEN 650 MG: 325 TABLET ORAL at 08:04

## 2023-01-01 RX ADMIN — ACETAMINOPHEN 650 MG: 325 TABLET ORAL at 12:07

## 2023-01-01 RX ADMIN — CLOPIDOGREL BISULFATE 75 MG: 75 TABLET, FILM COATED ORAL at 09:07

## 2023-01-01 RX ADMIN — ACETAMINOPHEN 650 MG: 325 TABLET ORAL at 04:08

## 2023-01-01 RX ADMIN — ACETAMINOPHEN 650 MG: 325 TABLET ORAL at 07:07

## 2023-01-01 RX ADMIN — OXYCODONE HYDROCHLORIDE AND ACETAMINOPHEN 1 TABLET: 5; 325 TABLET ORAL at 01:07

## 2023-01-01 RX ADMIN — Medication 10 ML: at 08:04

## 2023-01-01 RX ADMIN — Medication 3 MG: at 09:04

## 2023-01-01 RX ADMIN — ACETAMINOPHEN 650 MG: 325 TABLET ORAL at 03:04

## 2023-01-01 RX ADMIN — HYDRALAZINE HYDROCHLORIDE 5 MG: 20 INJECTION INTRAMUSCULAR; INTRAVENOUS at 05:04

## 2023-01-01 RX ADMIN — AMLODIPINE BESYLATE 5 MG: 5 TABLET ORAL at 08:04

## 2023-01-01 RX ADMIN — VALSARTAN 80 MG: 80 TABLET ORAL at 03:04

## 2023-01-30 NOTE — PROGRESS NOTES
[facility]     PATIENT NAME: Tosin Cortez   : 10/10/1932    AGE: 89 y.o. DATE: 2023   MRN: [id number]        Reason for Visit / Chief Complaint: Medicare AWV (Subsequent Visit)        Tosin Cortez presents for a Subsequent Medicare AWV today.     The following components were reviewed and updated:    Medical/Social/Family History:  Past Medical History:   Diagnosis Date    Hypertension     Stroke         Family History   Problem Relation Age of Onset    Hypertension Mother     Cancer Mother     Heart disease Mother     Diabetes Mother     Diabetes Father     Cancer Sister         Social History     Tobacco Use   Smoking Status Never   Smokeless Tobacco Never      Social History     Substance and Sexual Activity   Alcohol Use Never       Family History   Problem Relation Age of Onset    Hypertension Mother     Cancer Mother     Heart disease Mother     Diabetes Mother     Diabetes Father     Cancer Sister        Past Surgical History:   Procedure Laterality Date    BACK SURGERY N/A     CATARACT EXTRACTION      CHOLECYSTECTOMY      HYSTERECTOMY      JOINT REPLACEMENT           Allergies and Current Medications   Review of patient's allergies indicates:  No Known Allergies    Current Outpatient Medications:     clopidogreL (PLAVIX) 75 mg tablet, Take 75 mg by mouth once daily., Disp: , Rfl:     famotidine (PEPCID) 20 MG tablet, Take 20 mg by mouth once daily., Disp: , Rfl:     fluticasone propionate (FLONASE) 50 mcg/actuation nasal spray, SPRAY one SPRAY in each nostril TWICE DAILY, Disp: 48 g, Rfl: 3    ibuprofen (ADVIL,MOTRIN) 200 MG tablet, Take 200 mg by mouth every 6 (six) hours as needed for Pain., Disp: , Rfl:     magnesium oxide (MAG-OX) 250 mg magnesium Tab, Take 250 mg by mouth once daily., Disp: , Rfl:     omega-3 fatty acids/fish oil (FISH OIL-OMEGA-3 FATTY ACIDS) 300-1,000 mg capsule, Take 2 g by mouth once daily at 6am., Disp: , Rfl:     RESTASIS 0.05 % ophthalmic  emulsion, INSTILL ONE DROP BOTH EYES TWICE DAILY, Disp: , Rfl:     valsartan (DIOVAN) 80 MG tablet, Take 1 tablet (80 mg total) by mouth once daily., Disp: 90 tablet, Rfl: 2    vitamin D (VITAMIN D3) 1000 units Tab, Take 1,000 Units by mouth once daily at 6am., Disp: , Rfl:     aspirin (ECOTRIN) 81 MG EC tablet, Take 81 mg by mouth once daily at 6am., Disp: , Rfl:     atorvastatin (LIPITOR) 80 MG tablet, Take 80 mg by mouth once daily., Disp: , Rfl:     carvediloL (COREG) 12.5 MG tablet, Take 1 tablet (12.5 mg total) by mouth 2 (two) times daily. (Patient not taking: Reported on 8/16/2022), Disp: 180 tablet, Rfl: 0    clonazePAM (KLONOPIN) 0.5 MG tablet, Take 0.5 mg by mouth. 1/2 to one tablet at HS as needed, Disp: , Rfl:     valACYclovir (VALTREX) 1000 MG tablet, Take 1,000 mg by mouth 2 (two) times daily., Disp: , Rfl:     vitamin E 100 UNIT capsule, Take 100 Units by mouth once daily at 6am., Disp: , Rfl:     zolpidem (AMBIEN) 5 MG Tab, Take 1 tablet (5 mg total) by mouth nightly as needed (insomnia). (Patient not taking: Reported on 8/16/2022), Disp: 30 tablet, Rfl: 1    Health Risk Assessment   Fall Risk:  Yes   Obesity: BMI    28.85   Advance Directive:   Does not have an advanced directive. Verbal education and written education included in today's AVS.   Depression: PHQ 9 score:  3   HTN: Yes, DASH diet, exercise, weight management, med compliance, home BP monitoring, and follow-up discussed.   T2DM: No     Tobacco use: Denies  STI: N/A    Alcohol misuse: Denies   Statin Use: Yes      Health Risk Assessment  What is your age?: 80 or older  Are you male or female?: Female  During the past four weeks, how much have you been bothered by emotional problems such as feeling anxious, depressed, irritable, sad, or downhearted and blue?: Not at all  During the past five weeks, has your physical and/or emotional health limited your social activities with family, friends, neighbors, or groups?: Not at all  During the  past four weeks, how much bodily pain have you generally had?: Mild pain  During the past four weeks, was someone available to help if you needed and wanted help?: Yes, as much as I wanted  During the past four weeks, what was the hardest physical activity you could do for at least two minutes?: Light  Can you get to places out of walking distance without help?  (For example, can you travel alone on buses or taxis, or drive your own car?): No  Can you go shopping for groceries or clothes without someone's help?: No  Can you prepare your own meals?: No  Can you do your own housework without help?: No  Because of any health problems, do you need the help of another person with your personal care needs such as eating, bathing, dressing, or getting around the house?: No  Can you handle your own money without help?: No  During the past four weeks, how would you rate your health in general?: Good  How have things been going for you during the past four weeks?: Pretty bad  Are you having difficulties driving your car?: Not applicable, I do not use a car  Do you always fasten your seat belt when you are in a car?: Yes, usually  How often in the past four weeks have you been bothered by falling or dizzy when standing up?: Seldom  How often in the past four weeks have you been bothered by sexual problems?: Never  How often in the past four weeks have you been bothered by trouble eating well?: Never  How often in the past four weeks have you been bothered by teeth or denture problems?: Never  How often in the past four weeks have you been bothered with problems using the telephone?: Never  How often in the past four weeks have you been bothered by tiredness or fatigue?: Sometimes  Have you fallen two or more times in the past year?: No  Are you afraid of falling?: Yes  Are you a smoker?: No  During the past four weeks, how many drinks of wine, beer, or other alcoholic beverages did you have?: No alcohol at all  Do you exercise  for about 20 minutes three or more days a week?: No, I usually do not exercise this much  Have you been given any information to help you with hazards in your house that might hurt you?: No  Have you been given any information to help you with keeping track of your medications?: No  How often do you have trouble taking medicines the way you've been told to take them?: I always take them as prescribed  How confident are you that you can control and manage most of your health problems?: Somewhat confident  What is your race? (Check all that apply.):     Health Maintenance   Last eye exam:  2022   Last CV screen with lipids: 05/30/2022   Diabetes screening with fasting glucose or A1c:     Colonoscopy:  11/17/2017   Flu Vaccine:  States got 10/2022   Pneumonia vaccines:  23: 12/10/2001   COVID vaccine: Moderna: 01/14/2021; 02/12/2021; [id number]   Hep B vaccine: N/A   DEXA: 12/03/2019   Last pap/pelvic: N/A   Last Mammogram: 12/15/2021   Last PSA screen: N/A   AAA screening:  N/A    HIV Screeing:  N/A  Hepatitis C Screen: N/A  Low Dose CT Scan: N/A    Health Maintenance Topics with due status: Not Due       Topic Last Completion Date    Lipid Panel 05/30/2022     Health Maintenance Due   Topic Date Due    TETANUS VACCINE  Never done    Shingles Vaccine (1 of 2) Never done    Pneumococcal Vaccines (Age 65+) (1 - PCV) Never done    COVID-19 Vaccine (4 - Booster for Moderna series) 12/27/2021    Influenza Vaccine (1) Never done    Colonoscopy  11/17/2022    DEXA Scan  12/03/2022    Mammogram  12/15/2022       Incontinence  Bowel:  No  Bladder:  Yes    Lab results available in Epic or see dates from [facility] above:   Lab Results   Component Value Date    CHOL 204 (H) 01/18/2022    CHOL 265 (H) 09/20/2021     Lab Results   Component Value Date    HDL 57 01/18/2022    HDL 71 (H) 09/20/2021     Lab Results   Component Value Date    LDLCALC 124 01/18/2022    LDLCALC 167 09/20/2021     Lab Results   Component Value Date     TRIG 114 01/18/2022    TRIG 133 09/20/2021     Lab Results   Component Value Date    CHOLHDL 3.6 01/18/2022    CHOLHDL 3.7 09/20/2021       Lab Results   Component Value Date    HGBA1C 4.8 05/30/2022       Sodium   Date Value Ref Range Status   01/18/2022 137 136 - 145 mmol/L Final     Potassium   Date Value Ref Range Status   01/18/2022 4.6 3.5 - 5.1 mmol/L Final     Chloride   Date Value Ref Range Status   01/18/2022 103 98 - 107 mmol/L Final     CO2   Date Value Ref Range Status   01/18/2022 34 (H) 21 - 32 mmol/L Final     Glucose   Date Value Ref Range Status   01/18/2022 98 74 - 106 mg/dL Final     BUN   Date Value Ref Range Status   01/18/2022 11 7 - 18 mg/dL Final     Creatinine   Date Value Ref Range Status   01/18/2022 0.83 0.55 - 1.02 mg/dL Final     Calcium   Date Value Ref Range Status   01/18/2022 9.1 8.5 - 10.1 mg/dL Final     Total Protein   Date Value Ref Range Status   01/18/2022 6.9 6.4 - 8.2 g/dL Final     Albumin   Date Value Ref Range Status   01/18/2022 3.2 (L) 3.5 - 5.0 g/dL Final     Bilirubin, Total   Date Value Ref Range Status   01/18/2022 1.8 (H) 0.0 - 1.2 mg/dL Final     Alk Phos   Date Value Ref Range Status   01/18/2022 69 55 - 142 U/L Final     AST   Date Value Ref Range Status   01/18/2022 27 15 - 37 U/L Final     ALT   Date Value Ref Range Status   01/18/2022 30 13 - 56 U/L Final     Anion Gap   Date Value Ref Range Status   01/18/2022 5 (L) 7 - 16 mmol/L Final     eGFR   Date Value Ref Range Status   01/18/2022 69 >=60 mL/min/1.73m² Final       Care Team   PCP: THERON Flores    Eye specialist:  Unable to remember  Neurologist:  Dr. Fang      **See Completed Assessments for Annual Wellness visit within the encounter summary    The following assessments were completed & reviewed:  Depression Screening  Cognitive function Screening  Timed Get Up Test  Whisper Test  Vision Screen  Health Risk Assessment  Checklist of ADLs and IADLs      Objective  Vitals:    01/31/23 1354   BP:  "132/74   Pulse: 100   Resp: 16   Temp: 97.9 °F (36.6 °C)   SpO2: 97%   Weight: 78.6 kg (173 lb 6 oz)   Height: 5' 5" (1.651 m)   PainSc: 0-No pain      Body mass index is 28.85 kg/m².  Ideal body weight: 57 kg (125 lb 10.6 oz)       Physical Exam  Vitals and nursing note reviewed.   Constitutional:       Appearance: Normal appearance. She is normal weight.   HENT:      Head: Normocephalic and atraumatic.      Mouth/Throat:      Mouth: Mucous membranes are moist.      Pharynx: Oropharynx is clear.   Eyes:      Extraocular Movements: Extraocular movements intact.      Conjunctiva/sclera: Conjunctivae normal.      Pupils: Pupils are equal, round, and reactive to light.   Cardiovascular:      Rate and Rhythm: Normal rate and regular rhythm.      Pulses: Normal pulses.      Heart sounds: Normal heart sounds.   Pulmonary:      Effort: Pulmonary effort is normal.      Breath sounds: Normal breath sounds.   Abdominal:      General: Abdomen is flat. Bowel sounds are normal.      Palpations: Abdomen is soft.   Musculoskeletal:         General: Normal range of motion.      Cervical back: Normal range of motion and neck supple.   Skin:     General: Skin is warm and dry.      Capillary Refill: Capillary refill takes less than 2 seconds.   Neurological:      General: No focal deficit present.      Mental Status: She is alert and oriented to person, place, and time. Mental status is at baseline.      GCS: GCS eye subscore is 4. GCS verbal subscore is 5. GCS motor subscore is 6.      Cranial Nerves: Cranial nerves 2-12 are intact.      Sensory: Sensory deficit (mildly decreased sensation on right upper and lower extremity) present.      Motor: Weakness (mild right sided hemiparesis. Right leg 4/5, fine motor skills in right hand mildly impaired) present.      Coordination: Coordination is intact.      Gait: Gait abnormal (unsteady due to previous CVA, ambulates with rollator walker).   Psychiatric:         Mood and Affect: Mood " normal.         Behavior: Behavior normal.         Thought Content: Thought content normal.         Judgment: Judgment normal.         Assessment:     1. Encounter for subsequent annual wellness visit (AWV) in Medicare patient    2. BMI 28.0-28.9,adult         Plan:    Continue using magnesium oxide as instructed by neurology and wear the glove to continue to help with the parasthesias    Referrals:         Advised to call office if does not hear from anyone with referral appt within 2-3 weeks to check on status of referral. Voiced understanding.    Discussed and provided with a screening schedule and personal prevention plan in accordance with USPSTF age appropriate recommendations and Medicare screening guidelines.   Education, counseling, and referrals were provided as needed.  After Visit Summary printed and given to patient which includes written education and a list of any referrals if indicated.     Education including advanced directives, diet, exercise, falls, and preventive health discussed with patient and patient verbalized understanding.      F/u plan for yearly AWV.    Signature:

## 2023-01-31 NOTE — PATIENT INSTRUCTIONS
Counseling and Referral of Other Preventative  (Italic type indicates deductible and co-insurance are waived)    Patient Name: Tosin Cortez  Today's Date: 1/31/2023    Health Maintenance         Date Due Completion Date    TETANUS VACCINE Never done ---    Shingles Vaccine (1 of 2) Never done ---    Pneumococcal Vaccines (Age 65+) (1 - PCV) Never done ---    COVID-19 Vaccine (4 - Booster for Moderna series) 12/27/2021 11/1/2021    Influenza Vaccine (1) Never done ---    Colonoscopy 11/17/2022 11/17/2017 (Done)    Override on 11/17/2017: Done    DEXA Scan 12/03/2022 12/3/2019 (Done)    Override on 12/3/2019: Done    Mammogram 12/15/2022 12/15/2021    Lipid Panel 05/30/2023 5/30/2022          No orders of the defined types were placed in this encounter.

## 2023-02-16 PROBLEM — R00.0 TACHYCARDIA: Status: ACTIVE | Noted: 2023-01-01

## 2023-02-16 NOTE — ASSESSMENT & PLAN NOTE
Overall her symptoms are mild and she is well-appearing and at her baseline. Her POCT UA showed dark urine with WBC; given her symptoms she should be treated for cystitis. Discussed obtaining ertapenem for a 1x IM dose, but it is not on formulary at [facility]. Will treat with 5 days of cipro. We will follow cultures closely, as several previous Ucx (approx 2021) showed several different MDR pathogens, including E. coli resistant to FQ, CTX, penicillin (sensitive to ertapenem and meropenem), Pseudomonas sensitive to cipro, and E fecalis sensitive to cipro.    For now, will do Cipro 500 BID for 5 days. If her urine culture does grow E. Coli, she will likely need IV abx.

## 2023-02-16 NOTE — ASSESSMENT & PLAN NOTE
Elevated today; suspect acute illness contributing. RTC tomorrow with medication to assess for adherence. She would benefit from skilled nursing to provide medication counseling and adherence. This is of great importance given her history of prior stroke and advanced age and other medication placing her at high risk for delirium, fall, and fracture.

## 2023-02-16 NOTE — ASSESSMENT & PLAN NOTE
Unclear cause; NSR on EKG today. Suspect volume depletion and/or acute UTI; 500 cc NS administered in clinic today with improvement in HR from 118 -> 96. Continue good po intake at home, start abx,  and return for follow-up tomorrow

## 2023-02-16 NOTE — ASSESSMENT & PLAN NOTE
Reports unintentional weight loss this past year. Needs comprehensive geriatrics assessment and workup for unintentional weight loss. Suspect depression may be contributing.

## 2023-02-16 NOTE — PROGRESS NOTES
"Subjective:       Patient ID: Tosin Cortez is a 89 y.o. female.    Chief Complaint: Dysuria    HPI    89-year-old woman complaining of insomnia and burning urination for several weeks. She does report some depression but has been attending senior services [facility] regularly. Her sone is her primary caregiver but is not with her today. She does not have all her medications with her.    She has not had any fever or chills, and she reports good po hydration and intake.     Unclear if she has home health        Objective:    /79   Pulse (!) 111   Temp 96.3 °F (35.7 °C) (Oral)   Ht 5' 5" (1.651 m)   Wt 78 kg (172 lb)   SpO2 98%   BMI 28.62 kg/m²     Physical Exam  Gen: NAD, well-groomed, well-dressed   HEENT: oropharynx benign; no conjunctival pallor or scleral icterus; mucous membranes moist  CV: fast rate, regular rhythm; no m/r/g appreciated  Pulm: CTAB; normal work of breathing  Abdomen: NABS, soft, non tender/non-distended; no hepatosplenomegaly appreciated  Extremities: warm, well-perfused; no peripheral edema  MSK: normal bulk and tone   Skin: warm and dry; no suspicious lesions  Neuro: CN II-XII grossly normal; no focal deficits; awake and alert; gait normal  Psych: pleasant affect; judgment/insight intact    EKG 2/16/23  NSR with no acute ischemic findings; QTC within normal range          Assessment / Plan:        Problem List Items Addressed This Visit       Essential hypertension     Elevated today; suspect acute illness contributing. RTC tomorrow with medication to assess for adherence. She would benefit from skilled nursing to provide medication counseling and adherence. This is of great importance given her history of prior stroke and advanced age and other medication placing her at high risk for delirium, fall, and fracture.         Other insomnia    Dysuria - Primary         Overall her symptoms are mild and she is well-appearing and at her baseline. Her POCT UA showed dark urine with WBC; given " her symptoms she should be treated for cystitis. Discussed obtaining ertapenem for a 1x IM dose, but it is not on formulary at [facility]. Will treat with 5 days of cipro. We will follow cultures closely, as several previous Ucx (approx 2021) showed several different MDR pathogens, including E. coli resistant to FQ, CTX, penicillin (sensitive to ertapenem and meropenem), Pseudomonas sensitive to cipro, and E fecalis sensitive to cipro.    For now, will do Cipro 500 BID for 5 days. If her urine culture does grow E. Coli, she will likely need IV abx.            Relevant Medications    ciprofloxacin HCl (CIPRO) 500 MG tablet    Other Relevant Orders    POCT URINALYSIS W/O SCOPE (Completed)    Urine Culture High Risk    Urinalysis    Weight loss     Reports unintentional weight loss this past year. Needs comprehensive geriatrics assessment and workup for unintentional weight loss. Suspect depression may be contributing.          Cerebrovascular accident (CVA) due to stenosis of left posterior cerebral artery    Depression    Tachycardia     Unclear cause; NSR on EKG today. Suspect volume depletion and/or acute UTI; 500 cc NS administered in clinic today with improvement in HR from 118 -> 96. Continue good po intake at home, start abx,  and return for follow-up tomorrow          Relevant Medications    sodium chloride 0.9% bolus 500 mL 500 mL (Completed)    Other Relevant Orders    EKG 12-lead         Plan:       Unclear what medication she is on; medication list in epic seems to indicate that she is no longer taking carvedilol (what was the indication for stopping this?) and no longer taking clonazepam or ambien. While these medications are generally to be avoided in elderly individuals, abrupt discontinuation could lead to delirium, tachycardia, worsened hypertension, and anxiety. She should return to the clinic with short interval follow-up with her caregiver to assess ADLs, iADLs, medication regimen/adherence, and discuss  home health referral     Face to face encounter time 30 minutes.    Non face to face encounter time 35 minutes.  Reviewing separate obtained history, counseling and educating the patient, family and/or other caregivers, ordering medications, tests or procedures; referring and communicating with other health care professionals; documenting clinical information in the electronic medical record; care coordination; independently interpreting results and communicating results to the patient, family, and/or caregivers.    Total time for visit  75 minutes

## 2023-02-17 PROBLEM — R82.71 ASYMPTOMATIC BACTERIURIA: Status: RESOLVED | Noted: 2022-01-01 | Resolved: 2023-01-01

## 2023-02-17 PROBLEM — B02.9 HERPES ZOSTER WITHOUT COMPLICATION: Status: RESOLVED | Noted: 2021-07-12 | Resolved: 2023-01-01

## 2023-02-17 NOTE — PROGRESS NOTES
"Subjective:       Patient ID: Tosin Cortez is a 89 y.o. female.    Chief Complaint: follow-up for UTI       HPI    89-year-old woman here for follow-up. Her son, Gil has accompanied her to the appointment today.    She is feeling much better today after starting antibiotic. Ucx grew pan-sensitive Strep spp.     She has brought all the medications she is currently taking. (And does not have atorvastatin with her)     She is no longer taking ambien or clonazepam and it has been quite a while since her last dose of that (so unlikely to be contributing to her tachycardia) Dr. Akanksha Malik (presumably the [place] psychiatrist from [facility] services [facility] prescribed melatonin,  and she reports having slept better since taking this.    Her son reports she attends  [facility] 3x per week and she has been enjoying this.    She does not have home health and wants to independently manage her medications. She does receive some assistance from her son Gil, who lives with her, as well as her other sons in the area. She has a lot of family support.      Objective:    BP (!) 140/88   Pulse 96   Temp 96.6 °F (35.9 °C) (Oral)   Ht 5' 5" (1.651 m)   Wt 78.9 kg (174 lb)   SpO2 97%   BMI 28.96 kg/m²     Physical Exam     Gen: NAD, well-groomed, well-dressed   HEENT: oropharynx benign; no conjunctival pallor or scleral icterus; mucous membranes moist  CV: normal rate, regular rhythm; no m/r/g appreciated  Pulm: CTAB; normal work of breathing  Abdomen: NABS, soft, non tender/non-distended; no hepatosplenomegaly appreciated  Extremities: warm, well-perfused; no peripheral edema  MSK: normal bulk and tone   Skin: warm and dry; no suspicious lesions  Neuro: CN II-XII grossly normal; no focal deficits; awake and alert; gait normal  Psych: pleasant affect; judgment/insight intact     Assessment / Plan:        Problem List Items Addressed This Visit       Other insomnia     Improved with melatonin. continue current medical regimen           " Hyperlipemia - Primary     Not adherent to atorvastatin, nor does she have the bottle. Will send in Rx today.         Relevant Medications    atorvastatin (LIPITOR) 80 MG tablet    Dysuria     Improving. Ucx grew pan-sensitive Strep spp. Continue abx through completion         Cerebrovascular accident (CVA) due to stenosis of left posterior cerebral artery     Unclear who she has been following with, but it is probably time for a check-in. She continues on Clopidogrel but stopped taking Atorvastatin. She does not report also taking ASA for DAPT.         Tachycardia     Improved/resolved after IVF and starting abx. Monitor; would not favor resuming BB unless there is a compelling reason         Gait instability     Using traditional walker and no recent falls; continue to monitor           Frailty syndrome in geriatric patient     With weight loss; not interested in home health at this time. Has good family support. May benefit from home PT however, if she is interested. Should have ACP discussion at subsequent visits. Unclear if she has been assessed for geriatric depression so will try and get records from Dr. Malik from [facility].               Face to face encounter time 15 minutes.    Non face to face encounter time 15 minutes.  Reviewing separate obtained history, counseling and educating the patient, family and/or other caregivers, ordering medications, tests or procedures; referring and communicating with other health care professionals; documenting clinical information in the electronic medical record; care coordination; independently interpreting results and communicating results to the patient, family, and/or caregivers.    Total time for visit  30 minutes

## 2023-02-21 PROBLEM — R26.81 GAIT INSTABILITY: Status: ACTIVE | Noted: 2023-01-01

## 2023-02-21 PROBLEM — R54 FRAILTY SYNDROME IN GERIATRIC PATIENT: Status: ACTIVE | Noted: 2023-01-01

## 2023-02-21 NOTE — ASSESSMENT & PLAN NOTE
Improved/resolved after IVF and starting abx. Monitor; would not favor resuming BB unless there is a compelling reason

## 2023-02-21 NOTE — ASSESSMENT & PLAN NOTE
With weight loss; not interested in home health at this time. Has good family support. May benefit from home PT however, if she is interested. Should have ACP discussion at subsequent visits. Unclear if she has been assessed for geriatric depression so will try and get records from Dr. Malik from [facility].

## 2023-02-21 NOTE — ASSESSMENT & PLAN NOTE
Unclear who she has been following with, but it is probably time for a check-in. She continues on Clopidogrel but stopped taking Atorvastatin. She does not report also taking ASA for DAPT.

## 2023-02-27 NOTE — TELEPHONE ENCOUNTER
----- Message from Zechariah Camacho MD sent at 2/21/2023  2:47 PM CST -----  Please contact the patient and let them know that the urine culture and susceptibility test indicates we treated her UTI with the appropriate antibiotic. No change in treatment is required.

## 2023-04-03 PROBLEM — N30.01 ACUTE CYSTITIS WITH HEMATURIA: Status: ACTIVE | Noted: 2023-01-01

## 2023-04-03 NOTE — ED PROVIDER NOTES
Encounter Date: 4/3/2023       History     Chief Complaint   Patient presents with    Hypertension     Pt to ED for elevated BP. Pt states her BP has been elevated since this AM. States her BP was 199/100 prior to arrival. Denies any pain.      89 yr old WF to ED with c/o elevated blood pressure today.      The history is provided by the patient and a relative.   Hypertension   This is a new problem. The current episode started today. The problem has been unchanged. Associated symptoms include anxiety. Pertinent negatives include no chest pain and no palpitations. There are no associated agents to hypertension.   Review of patient's allergies indicates:  No Known Allergies  Past Medical History:   Diagnosis Date    Herpes zoster without complication 7/12/2021    Hypertension     Stroke      Past Surgical History:   Procedure Laterality Date    BACK SURGERY N/A     CATARACT EXTRACTION      CHOLECYSTECTOMY      HYSTERECTOMY      JOINT REPLACEMENT       Family History   Problem Relation Age of Onset    Hypertension Mother     Cancer Mother     Heart disease Mother     Diabetes Mother     Diabetes Father     Cancer Sister      Social History     Tobacco Use    Smoking status: Never    Smokeless tobacco: Never   Substance Use Topics    Alcohol use: Never    Drug use: Never     Review of Systems   Constitutional:  Negative for activity change and fatigue.   Respiratory: Negative.     Cardiovascular:  Negative for chest pain and palpitations.   Genitourinary: Negative.    Musculoskeletal: Negative.    Skin: Negative.      Physical Exam     Initial Vitals [04/03/23 1635]   BP Pulse Resp Temp SpO2   (!) 210/98 98 20 98.8 °F (37.1 °C) (!) 93 %      MAP       --         Physical Exam    Nursing note and vitals reviewed.  Constitutional: She appears well-developed and well-nourished.   Neck: Neck supple.   Cardiovascular:  Normal rate and regular rhythm.           Pulmonary/Chest: Breath sounds normal.   Abdominal: Abdomen is  soft. There is no abdominal tenderness.   Musculoskeletal:         General: Normal range of motion.      Cervical back: Neck supple.     Neurological: She is alert and oriented to person, place, and time.   Skin: Skin is warm and dry.   Psychiatric: She has a normal mood and affect.       Medical Screening Exam   See Full Note    ED Course   Procedures  Labs Reviewed   COMPREHENSIVE METABOLIC PANEL - Abnormal; Notable for the following components:       Result Value    Bilirubin, Total 1.9 (*)     All other components within normal limits   CBC WITH DIFFERENTIAL - Abnormal; Notable for the following components:    Hemoglobin 16.8 (*)     Hematocrit 48.2 (*)     MCH 33.2 (*)     Platelet Count 145 (*)     Monocytes % 13.1 (*)     Eosinophils % 0.0 (*)     All other components within normal limits   URINALYSIS, REFLEX TO URINE CULTURE - Abnormal; Notable for the following components:    Leukocytes, UA Small (*)     All other components within normal limits   URINALYSIS, MICROSCOPIC - Abnormal; Notable for the following components:    WBC, UA 20-50 (*)     Bacteria, UA Few (*)     Squamous Epithelial Cells, UA Moderate (*)     All other components within normal limits   CULTURE, URINE   CBC W/ AUTO DIFFERENTIAL    Narrative:     The following orders were created for panel order CBC Auto Differential.  Procedure                               Abnormality         Status                     ---------                               -----------         ------                     CBC with Differential[[id number]]        Abnormal            Final result                 Please view results for these tests on the individual orders.          Imaging Results              X-Ray Chest 1 View (Final result)  Result time 04/03/23 17:00:01      Final result by Eduin Sexton DO (04/03/23 17:00:01)                   Impression:      No acute cardiopulmonary process demonstrated.    Point of Service: [facility]      Electronically signed by: Eduin Darshan  Date:    04/03/2023  Time:    17:00               Narrative:    EXAMINATION:  XR CHEST 1 VIEW    CLINICAL HISTORY:  Essential (primary) hypertension    COMPARISON:  Chest x-ray October 25, 2021    TECHNIQUE:  Frontal view/views of the chest.    FINDINGS:  The cardiomediastinal silhouette is stable in configuration.  Chronic change of the lungs without focal consolidation, pleural effusion, or pneumothorax.  Visualized osseous and surrounding soft tissue structures appear grossly unchanged.  Reverse right glenohumeral prosthesis.                                       Medications   cefTRIAXone (ROCEPHIN) 1 g in dextrose 5 % in water (D5W) 5 % 50 mL IVPB (MB+) (1 g Intravenous New Bag 4/3/23 1828)   hydrALAZINE injection 5 mg (5 mg Intravenous Given 4/3/23 1758)     Medical Decision Making:   Initial Assessment:   Ms Cortez is a pleasant 89 yr old WF with a PMH of HTN, chronic lower back pain, CVA last year to ED with c/o blood pressure has been high today.  Denies pain but reports feels jittery.    Differential Diagnosis:   Uncontrolled HTN  CVA      Clinical Tests:   Lab Tests: Ordered and Reviewed  The following lab test(s) were unremarkable: CBC, CMP and Urinalysis  Radiological Study: Ordered and Reviewed  Medical Tests: Ordered and Reviewed  ED Management:  IV rocephin   Cardiac monitor  hydralazine  Other:   I have discussed this case with another health care provider.       <> Summary of the Discussion: Discussed with DR. Plasencia who accepts for admit for OBS.  Admit for Obs for uncontrolled Htn and UTI                 Clinical Impression:   Final diagnoses:  [I10] Hypertension  [I10] Hypertension, unspecified type (Primary)  [N39.0] Urinary tract infection without hematuria, site unspecified  [I10] Uncontrolled hypertension        ED Disposition Condition    Observation Stable                THERON Malone  04/03/23 1562       Jaye Johnson,  HUGO  04/04/23 0225

## 2023-04-03 NOTE — Clinical Note
Diagnosis: Uncontrolled hypertension [007812]   Future Attending Provider: TREVIN SHIN [[id number]]   Admitting Provider:: TREVIN SHIN [[id number]]   Special Needs:: No Special Needs [1]

## 2023-04-04 PROBLEM — N30.01 ACUTE CYSTITIS WITH HEMATURIA: Status: RESOLVED | Noted: 2023-01-01 | Resolved: 2023-01-01

## 2023-04-04 PROBLEM — E87.1 HYPONATREMIA: Status: ACTIVE | Noted: 2023-01-01

## 2023-04-04 NOTE — PLAN OF CARE
Problem: Adult Inpatient Plan of Care  Goal: Plan of Care Review  Outcome: Ongoing, Progressing  Goal: Patient-Specific Goal (Individualized)  Outcome: Ongoing, Progressing  Goal: Absence of Hospital-Acquired Illness or Injury  Outcome: Ongoing, Progressing  Goal: Optimal Comfort and Wellbeing  Outcome: Ongoing, Progressing  Goal: Readiness for Transition of Care  Outcome: Ongoing, Progressing     Problem: Fall Injury Risk  Goal: Absence of Fall and Fall-Related Injury  Outcome: Ongoing, Progressing     Problem: Fatigue  Goal: Improved Activity Tolerance  Outcome: Ongoing, Progressing     Problem: Hypertension Acute  Goal: Blood Pressure Within Desired Range  Outcome: Ongoing, Progressing     Problem: Impaired Wound Healing  Goal: Optimal Wound Healing  Outcome: Ongoing, Progressing

## 2023-04-04 NOTE — SUBJECTIVE & OBJECTIVE
Past Medical History:   Diagnosis Date    Herpes zoster without complication 7/12/2021    Hypertension     Stroke        Past Surgical History:   Procedure Laterality Date    BACK SURGERY N/A     CATARACT EXTRACTION      CHOLECYSTECTOMY      HYSTERECTOMY      JOINT REPLACEMENT         Review of patient's allergies indicates:  No Known Allergies    No current facility-administered medications on file prior to encounter.     Current Outpatient Medications on File Prior to Encounter   Medication Sig    atorvastatin (LIPITOR) 80 MG tablet Take 1 tablet (80 mg total) by mouth once daily.    clopidogreL (PLAVIX) 75 mg tablet Take 75 mg by mouth once daily.    famotidine (PEPCID) 20 MG tablet Take 20 mg by mouth once daily.    magnesium oxide (MAG-OX) 250 mg magnesium Tab Take 250 mg by mouth once daily.    melatonin (MELATIN) 3 mg tablet Take 3 mg by mouth nightly.    valsartan (DIOVAN) 80 MG tablet Take 1 tablet (80 mg total) by mouth once daily.     Family History       Problem Relation (Age of Onset)    Cancer Mother, Sister    Diabetes Mother, Father    Heart disease Mother    Hypertension Mother          Tobacco Use    Smoking status: Never    Smokeless tobacco: Never   Substance and Sexual Activity    Alcohol use: Never    Drug use: Never    Sexual activity: Not Currently     Partners: Male     Review of Systems   Constitutional:  Positive for appetite change. Negative for chills and fever.   Respiratory:  Negative for cough, shortness of breath and wheezing.    Cardiovascular:  Negative for chest pain, palpitations and leg swelling.   Gastrointestinal:  Negative for abdominal distention, diarrhea, nausea and vomiting.   Genitourinary:  Negative for dysuria.   Skin:  Negative for rash.   Neurological:  Positive for weakness. Negative for dizziness, seizures and syncope.   Psychiatric/Behavioral:  Negative for agitation, behavioral problems and confusion.    All other systems reviewed and are negative.  Objective:      Vital Signs (Most Recent):  Temp: 97.8 °F (36.6 °C) (04/04/23 0702)  Pulse: 99 (04/04/23 0702)  Resp: 18 (04/04/23 0702)  BP: (!) 197/95 (04/04/23 0702)  SpO2: (!) 94 % (04/04/23 0702)   Vital Signs (24h Range):  Temp:  [97.8 °F (36.6 °C)-98.8 °F (37.1 °C)] 97.8 °F (36.6 °C)  Pulse:  [] 99  Resp:  [17-24] 18  SpO2:  [93 %-98 %] 94 %  BP: (157-210)/() 197/95     Weight: 75.9 kg (167 lb 6.4 oz)  Body mass index is 27.86 kg/m².    Physical Exam  Vitals reviewed.   Constitutional:       General: She is not in acute distress.     Appearance: Normal appearance.   HENT:      Head: Normocephalic and atraumatic.   Eyes:      General: No scleral icterus.     Extraocular Movements: Extraocular movements intact.      Conjunctiva/sclera: Conjunctivae normal.      Pupils: Pupils are equal, round, and reactive to light.   Cardiovascular:      Rate and Rhythm: Normal rate and regular rhythm.      Heart sounds: No murmur heard.    No friction rub. No gallop.   Pulmonary:      Effort: Pulmonary effort is normal. No respiratory distress.      Breath sounds: Normal breath sounds. No wheezing or rales.   Abdominal:      General: Abdomen is flat. Bowel sounds are normal. There is no distension.      Palpations: Abdomen is soft.      Tenderness: There is no abdominal tenderness. There is no guarding.   Musculoskeletal:         General: No swelling.      Right lower leg: No edema.      Left lower leg: No edema.   Skin:     General: Skin is warm and dry.      Coloration: Skin is not jaundiced.      Findings: No rash.   Neurological:      General: No focal deficit present.      Mental Status: She is alert and oriented to person, place, and time.      Sensory: No sensory deficit.      Motor: Weakness present.   Psychiatric:         Mood and Affect: Mood normal.         Behavior: Behavior normal.         Thought Content: Thought content normal.         CRANIAL NERVES     CN III, IV, VI   Pupils are equal, round, and reactive to  light.     Significant Labs: All pertinent labs within the past 24 hours have been reviewed.  Recent Lab Results         04/04/23  0546   04/04/23  0104   04/03/23  1752   04/03/23  1645        Albumin/Globulin Ratio       1.1       Albumin       4.0       Alkaline Phosphatase       68       ALT       25       Anion Gap 10       11       Appearance, UA     Clear         AST       25       Bacteria, UA     Few         Baso # 0.02       0.02       Basophil % 0.4       0.4       Bilirubin (UA)     Negative         BILIRUBIN TOTAL       1.9       BUN 9       10       BUN/CREAT RATIO 28       12       Calcium 8.5       9.1       Chloride 92       98       CO2 29       31       Color, UA     Yellow         Creatinine 0.32       0.86       Differential Type Auto       Auto       eGFR 99       64       Eos # 0.01       0.00       Eosinophil % 0.2       0.0       Globulin, Total       3.6       Glucose 93       90       Glucose, UA     Negative         Hematocrit 44.9       48.2       Hemoglobin 15.8       16.8       Ketones, UA     Trace         Leukocytes, UA     Small         Lymph # 1.61       1.83       Lymph % 31.8       32.3       Magnesium   2.0           MCH 33.5       33.2       MCHC 35.2       34.9       MCV 95.3       95.3       Mono # 0.76       0.74       Mono % 15.0       13.1       MPV 9.7       10.0       Neutrophils, Abs 2.66       3.08       Neutrophils Relative 52.6       54.2       NITRITE UA     Negative         Occult Blood UA     Negative         pH, UA     7.0         Platelets 141       145       Potassium 3.6       3.9       PROTEIN TOTAL       7.6       Protein, UA     Negative         RBC 4.71       5.06       RBC, UA     None Seen         RDW 12.7       12.7       Sodium 127       136       Specific [place], UA     1.010         Squam Epithel, UA     Moderate         UROBILINOGEN UA     0.2         WBC, UA     20-50         WBC 5.06       5.67               Significant Imaging: I have reviewed  all pertinent imaging results/findings within the past 24 hours.

## 2023-04-04 NOTE — H&P
[facility] Medicine  History & Physical    Patient Name: Tosin Cortez  MRN: [id number]  Patient Class: IP- Inpatient  Admission Date: 4/3/2023  Attending Physician: Won Plasencia DO   Primary Care Provider: THERON Ibarra         Patient information was obtained from patient, past medical records and ER records.     Subjective:     Principal Problem:Uncontrolled hypertension    Chief Complaint:   Chief Complaint   Patient presents with    Hypertension     Pt to ED for elevated BP. Pt states her BP has been elevated since this AM. States her BP was 199/100 prior to arrival. Denies any pain.         HPI: Ms Cortez is a pleasant 89 yr old WF with a PMH of HTN, which she takes valsartan and CVA last year.  She was admitted from the Ed with uncontrolled HTN despite taking medication.  She is being admitted for observation and increase meds as needed.  Denies NV, CP, SOB, dysuria.       Past Medical History:   Diagnosis Date    Herpes zoster without complication 7/12/2021    Hypertension     Stroke        Past Surgical History:   Procedure Laterality Date    BACK SURGERY N/A     CATARACT EXTRACTION      CHOLECYSTECTOMY      HYSTERECTOMY      JOINT REPLACEMENT         Review of patient's allergies indicates:  No Known Allergies    No current facility-administered medications on file prior to encounter.     Current Outpatient Medications on File Prior to Encounter   Medication Sig    atorvastatin (LIPITOR) 80 MG tablet Take 1 tablet (80 mg total) by mouth once daily.    clopidogreL (PLAVIX) 75 mg tablet Take 75 mg by mouth once daily.    famotidine (PEPCID) 20 MG tablet Take 20 mg by mouth once daily.    magnesium oxide (MAG-OX) 250 mg magnesium Tab Take 250 mg by mouth once daily.    melatonin (MELATIN) 3 mg tablet Take 3 mg by mouth nightly.    valsartan (DIOVAN) 80 MG tablet Take 1 tablet (80 mg total) by mouth once daily.     Family History        Problem Relation (Age of Onset)    Cancer Mother, Sister    Diabetes Mother, Father    Heart disease Mother    Hypertension Mother          Tobacco Use    Smoking status: Never    Smokeless tobacco: Never   Substance and Sexual Activity    Alcohol use: Never    Drug use: Never    Sexual activity: Not Currently     Partners: Male     Review of Systems   Constitutional:  Positive for appetite change. Negative for chills and fever.   Respiratory:  Negative for cough, shortness of breath and wheezing.    Cardiovascular:  Negative for chest pain, palpitations and leg swelling.   Gastrointestinal:  Negative for abdominal distention, diarrhea, nausea and vomiting.   Genitourinary:  Negative for dysuria.   Skin:  Negative for rash.   Neurological:  Positive for weakness. Negative for dizziness, seizures and syncope.   Psychiatric/Behavioral:  Negative for agitation, behavioral problems and confusion.    All other systems reviewed and are negative.  Objective:     Vital Signs (Most Recent):  Temp: 97.8 °F (36.6 °C) (04/04/23 0702)  Pulse: 99 (04/04/23 0702)  Resp: 18 (04/04/23 0702)  BP: (!) 197/95 (04/04/23 0702)  SpO2: (!) 94 % (04/04/23 0702)   Vital Signs (24h Range):  Temp:  [97.8 °F (36.6 °C)-98.8 °F (37.1 °C)] 97.8 °F (36.6 °C)  Pulse:  [] 99  Resp:  [17-24] 18  SpO2:  [93 %-98 %] 94 %  BP: (157-210)/() 197/95     Weight: 75.9 kg (167 lb 6.4 oz)  Body mass index is 27.86 kg/m².    Physical Exam  Vitals reviewed.   Constitutional:       General: She is not in acute distress.     Appearance: Normal appearance.   HENT:      Head: Normocephalic and atraumatic.   Eyes:      General: No scleral icterus.     Extraocular Movements: Extraocular movements intact.      Conjunctiva/sclera: Conjunctivae normal.      Pupils: Pupils are equal, round, and reactive to light.   Cardiovascular:      Rate and Rhythm: Normal rate and regular rhythm.      Heart sounds: No murmur heard.    No friction rub. No gallop.    Pulmonary:      Effort: Pulmonary effort is normal. No respiratory distress.      Breath sounds: Normal breath sounds. No wheezing or rales.   Abdominal:      General: Abdomen is flat. Bowel sounds are normal. There is no distension.      Palpations: Abdomen is soft.      Tenderness: There is no abdominal tenderness. There is no guarding.   Musculoskeletal:         General: No swelling.      Right lower leg: No edema.      Left lower leg: No edema.   Skin:     General: Skin is warm and dry.      Coloration: Skin is not jaundiced.      Findings: No rash.   Neurological:      General: No focal deficit present.      Mental Status: She is alert and oriented to person, place, and time.      Sensory: No sensory deficit.      Motor: Weakness present.   Psychiatric:         Mood and Affect: Mood normal.         Behavior: Behavior normal.         Thought Content: Thought content normal.         CRANIAL NERVES     CN III, IV, VI   Pupils are equal, round, and reactive to light.     Significant Labs: All pertinent labs within the past 24 hours have been reviewed.  Recent Lab Results         04/04/23  0546   04/04/23  0104   04/03/23  1752   04/03/23  1645        Albumin/Globulin Ratio       1.1       Albumin       4.0       Alkaline Phosphatase       68       ALT       25       Anion Gap 10       11       Appearance, UA     Clear         AST       25       Bacteria, UA     Few         Baso # 0.02       0.02       Basophil % 0.4       0.4       Bilirubin (UA)     Negative         BILIRUBIN TOTAL       1.9       BUN 9       10       BUN/CREAT RATIO 28       12       Calcium 8.5       9.1       Chloride 92       98       CO2 29       31       Color, UA     Yellow         Creatinine 0.32       0.86       Differential Type Auto       Auto       eGFR 99       64       Eos # 0.01       0.00       Eosinophil % 0.2       0.0       Globulin, Total       3.6       Glucose 93       90       Glucose, UA     Negative         Hematocrit  44.9       48.2       Hemoglobin 15.8       16.8       Ketones, UA     Trace         Leukocytes, UA     Small         Lymph # 1.61       1.83       Lymph % 31.8       32.3       Magnesium   2.0           MCH 33.5       33.2       MCHC 35.2       34.9       MCV 95.3       95.3       Mono # 0.76       0.74       Mono % 15.0       13.1       MPV 9.7       10.0       Neutrophils, Abs 2.66       3.08       Neutrophils Relative 52.6       54.2       NITRITE UA     Negative         Occult Blood UA     Negative         pH, UA     7.0         Platelets 141       145       Potassium 3.6       3.9       PROTEIN TOTAL       7.6       Protein, UA     Negative         RBC 4.71       5.06       RBC, UA     None Seen         RDW 12.7       12.7       Sodium 127       136       Specific [place], UA     1.010         Squam Epithel, UA     Moderate         UROBILINOGEN UA     0.2         WBC, UA     20-50         WBC 5.06       5.67               Significant Imaging: I have reviewed all pertinent imaging results/findings within the past 24 hours.    Assessment/Plan:     * Uncontrolled hypertension  Monitor BP, increase meds as required  Added Norvasc to ARB and BB as well.  Has room to adjust.     Hyponatremia  Dropped from yesterday.  Will repeat in am.  May need to adjust ARB.        Depression  Patient has persistent depression which is mild and is currently controlled. Will Continue anti-depressant medications. We will not consult psychiatry at this time. Patient does not display psychosis at this time. Continue to monitor closely and adjust plan of care as needed.        Cerebrovascular accident (CVA) due to stenosis of left posterior cerebral artery  Stable, old CVA      Chronic low back pain without sciatica  Stable, PT if needed         VTE Risk Mitigation (From admission, onward)         Ordered     IP VTE LOW RISK PATIENT  Once         04/03/23 2118     Place ALLEY hose  Until discontinued         04/03/23 2118                            Won Plasencia,   Department of Hospital Medicine  [facility]

## 2023-04-04 NOTE — PLAN OF CARE
Problem: Adult Inpatient Plan of Care  Goal: Plan of Care Review  Outcome: Ongoing, Progressing  Goal: Patient-Specific Goal (Individualized)  Outcome: Ongoing, Progressing  Goal: Absence of Hospital-Acquired Illness or Injury  Outcome: Ongoing, Progressing  Goal: Optimal Comfort and Wellbeing  Outcome: Ongoing, Progressing  Goal: Readiness for Transition of Care  Outcome: Ongoing, Progressing     Problem: Fall Injury Risk  Goal: Absence of Fall and Fall-Related Injury  Outcome: Ongoing, Progressing     Problem: Fatigue  Goal: Improved Activity Tolerance  Outcome: Ongoing, Progressing     Problem: Hypertension Acute  Goal: Blood Pressure Within Desired Range  Outcome: Ongoing, Progressing

## 2023-04-04 NOTE — NURSING
Nurses Note -- 4 Eyes      4/3/2023   9:21 PM      Skin assessed during: Admit      [] No Pressure Injuries Present    []Prevention Measures Documented      [] Yes- Altered Skin Integrity Present or Discovered   [] LDA Added if Not in Epic (Describe Wound)   [x] New Altered Skin Integrity was Present on Admit and Documented in LDA   [] Wound Image Taken    Wound Care Consulted? No    Attending Nurse:  Sukumar Albrecht LPN     Second RN/Staff Member:  Halima Adair RN

## 2023-04-04 NOTE — HOSPITAL COURSE
Blood pressure 185/104, will give dose of valsartan now and change frequency to BID.      4/5 BP is better, added Metoprolol 25 BID and HR and BP better today.  Wants to go home.  Lives with son.

## 2023-04-04 NOTE — HPI
Ms Cortez is a pleasant 89 yr old WF with a PMH of HTN, which she takes valsartan and CVA last year.  She was admitted from the Ed with uncontrolled HTN despite taking medication.  She is being admitted for observation and increase meds as needed.  Denies NV, CP, SOB, dysuria.

## 2023-04-05 PROBLEM — E87.1 HYPONATREMIA: Status: RESOLVED | Noted: 2023-01-01 | Resolved: 2023-01-01

## 2023-04-05 PROBLEM — I10 UNCONTROLLED HYPERTENSION: Status: RESOLVED | Noted: 2021-06-14 | Resolved: 2023-01-01

## 2023-04-05 NOTE — DISCHARGE SUMMARY
[facility]  Hospital Medicine  Discharge Summary      Patient Name: Tosin Cortez  MRN: [id number]  JANET: [id number]  Patient Class: IP- Inpatient  Admission Date: 4/3/2023  Hospital Length of Stay: 1 days  Discharge Date and Time:  04/05/2023 11:08 AM  Attending Physician: Won Plasencia DO   Discharging Provider: Won Plasencia DO  Primary Care Provider: THERON Ibarra    Primary Care Team: Networked reference to record PCT     HPI:   Ms Cortez is a pleasant 89 yr old WF with a PMH of HTN, which she takes valsartan and CVA last year.  She was admitted from the Ed with uncontrolled HTN despite taking medication.  She is being admitted for observation and increase meds as needed.  Denies NV, CP, SOB, dysuria.       * No surgery found *      Hospital Course:   Blood pressure 185/104, will give dose of valsartan now and change frequency to BID.      4/5 BP is better, added Metoprolol 25 BID and HR and BP better today.  Wants to go home.  Lives with son.        Goals of Care Treatment Preferences:  Code Status: Full Code      Consults:     No new Assessment & Plan notes have been filed under this hospital service since the last note was generated.  Service: Hospital Medicine    Final Active Diagnoses:    Diagnosis Date Noted POA    Depression [F32.A] 11/21/2022 Yes    Cerebrovascular accident (CVA) due to stenosis of left posterior cerebral artery [I63.532] 06/02/2022 Yes    Chronic low back pain without sciatica [M54.50, G89.29] 10/25/2021 Yes      Problems Resolved During this Admission:    Diagnosis Date Noted Date Resolved POA    PRINCIPAL PROBLEM:  Uncontrolled hypertension [I10] 06/14/2021 04/05/2023 Yes    Hyponatremia [E87.1] 04/04/2023 04/05/2023 No    Acute cystitis with hematuria [N30.01] 04/03/2023 04/04/2023 Yes       Discharged Condition: good    Disposition: Home or Self Care    Follow Up:    Patient Instructions:   No discharge procedures on  file.    Significant Diagnostic Studies: Labs:   BMP:   Recent Labs   Lab 04/03/23  1645 04/04/23  0104 04/04/23  0546 04/05/23  0542   GLU 90  --  93 102     --  127* 134*   K 3.9  --  3.6 4.3   CL 98  --  92* 98   CO2 31  --  29 29   BUN 10  --  9 15   CREATININE 0.86  --  0.32* 0.83   CALCIUM 9.1  --  8.5 9.0   MG  --  2.0  --   --        Pending Diagnostic Studies:     None         Medications:  Reconciled Home Medications:      Medication List      START taking these medications    metoprolol tartrate 25 MG tablet  Commonly known as: LOPRESSOR  Take 1 tablet (25 mg total) by mouth 2 (two) times daily.        CONTINUE taking these medications    atorvastatin 80 MG tablet  Commonly known as: LIPITOR  Take 1 tablet (80 mg total) by mouth once daily.     clopidogreL 75 mg tablet  Commonly known as: PLAVIX  Take 75 mg by mouth once daily.     famotidine 20 MG tablet  Commonly known as: PEPCID  Take 20 mg by mouth once daily.     magnesium oxide 250 mg magnesium Tab  Commonly known as: MAG-OX  Take 250 mg by mouth once daily.     melatonin 3 mg tablet  Commonly known as: MELATIN  Take 3 mg by mouth nightly.     valsartan 80 MG tablet  Commonly known as: DIOVAN  Take 1 tablet (80 mg total) by mouth once daily.            Indwelling Lines/Drains at time of discharge:   Lines/Drains/Airways     None                 Time spent on the discharge of patient: 24 minutes         Won Plasencia DO  Department of Hospital Medicine  [facility]

## 2023-04-05 NOTE — NURSING
Nurses Note -- 4 Eyes      4/4/2023   7:15 PM      Skin assessed during: Q Shift Change      [] No Pressure Injuries Present    []Prevention Measures Documented      [] Yes- Altered Skin Integrity Present or Discovered   [] LDA Added if Not in Epic (Describe Wound)   [x] New Altered Skin Integrity was Present on Admit and Documented in LDA   [] Wound Image Taken    Wound Care Consulted? No    Attending Nurse:  Sukumar Albrecht LPN     Second RN/Staff Member:  Halima Adair RN

## 2023-04-05 NOTE — PLAN OF CARE
Problem: Adult Inpatient Plan of Care  Goal: Plan of Care Review  Outcome: Adequate for Care Transition  Goal: Patient-Specific Goal (Individualized)  Outcome: Adequate for Care Transition  Goal: Absence of Hospital-Acquired Illness or Injury  Outcome: Adequate for Care Transition  Goal: Optimal Comfort and Wellbeing  Outcome: Adequate for Care Transition  Goal: Readiness for Transition of Care  Outcome: Adequate for Care Transition     Problem: Fall Injury Risk  Goal: Absence of Fall and Fall-Related Injury  Outcome: Adequate for Care Transition     Problem: Fatigue  Goal: Improved Activity Tolerance  Outcome: Adequate for Care Transition     Problem: Hypertension Acute  Goal: Blood Pressure Within Desired Range  Outcome: Adequate for Care Transition     Problem: Impaired Wound Healing  Goal: Optimal Wound Healing  Outcome: Adequate for Care Transition

## 2023-04-06 NOTE — PROGRESS NOTES
C3 nurse spoke with Tosin Cortez  for a TCC post hospital discharge follow up call. The patient has a scheduled HOSFU appointment with THERON Ibarra  on 4/10/23 @ 930.  Unable to review medications during call. C3 nurse will contact patient son at a later time to review medications.

## 2023-04-06 NOTE — PHYSICIAN QUERY
PT Name: Tosin Cortez  MR #: [id number]     DOCUMENTATION CLARIFICATION     CDS/: Cora Bass RN CDIS             Contact information: [email or web address]    This form is a permanent document in the medical record.     Query Date: April 6, 2023    By submitting this query, we are merely seeking further clarification of documentation to reflect the severity of illness of your patient. Please utilize your independent clinical judgment when addressing the question(s) below.    The Medical Record contains the following:   Indicators   Supporting Clinical Findings Location in Medical Record   x Hypertension or hypertensive documented Uncontrolled hypertension Discharge summary     Acute/Chronic condition(s)     x Vital signs Vital Signs (24h Range):  BP: (157-210)/() 197/95 Discharge summary     Lab findings      Radiology findings     x Treatment/Medication Monitor BP, increase meds as required  Added Norvasc to ARB and BB as well.  Has room to adjust Discharge summary     Other       The clinical guidelines noted are only a system guideline. It does not replace the provider's clinical judgment.  Provider, please specify the diagnosis or diagnoses that correspond(s) to the above indicators:  [   ] Hypertensive crisis, unspecified   [ x  ] Hypertensive urgency - Severe asymptomatic hypertension (SBP>180 and/or DBP>120mmHg) without signs or symptoms of acute end-organ damage. Can have a mild headache.   [   ] Other cardiovascular condition (please specify): __________           Please document in your progress notes daily for the duration of treatment until resolved, and include in your discharge summary.    References:    JARON Dumas MD, PhD, & TIFFANY Smith MD, FACP, FCCP, FCCM, FRSM. (2020, June 25). Evaluation and treatment of hypertensive emergencies in adults (MONE Pelletier MD, JARON Strong MD, & TIFFANY Kerr MD, MSc, Eds.). Retrieved October 22, 2020, from  https://[email or web address]/contents/evaluation-and-treatment-gd-kcpiufqxjcgl-ffwdktabilg-in-adults?search=hypertensive%20emergency&source=search_result&selectedTitle=1~150&usage_type=default&display_rank=1    TIFFANY Smith MD, FACP, FCCP, FCCM, FRSM, & JARON Dumas MD, PhD. (2020, October 14). Management of severe asymptomatic hypertension (hypertensive urgencies) in adults (MONE Pelletier MD, JARON Strong MD, & TIFFANY Kerr MD, MSc, Eds.). Retrieved October 22, 2020, from https://[email or web address]/contents/management-of-severe-asymptomatic-hypertension-hypertensive-urgencies-in-adults?search=hypertensive%20urgency&source=search_result&selectedTitle=1~41&usage_type=default&display_rank=1    Form No. 78889

## 2023-04-11 NOTE — PROGRESS NOTES
THERON Ibarra   [place] [address] [place] [address], [place]  [address]     PATIENT NAME: Tosin Cortez  : 10/10/1932  DATE: 23  MRN: [id number]      Billing Provider: THERON Ibarra  Level of Service: ND OFFICE/OUTPT VISIT, EST, LEVL V, 40-54 MIN  Patient PCP Information       Provider PCP Type    THERON Ibarra General            Reason for Visit / Chief Complaint: hospital followup (Pt states she was hospitalized for hypertension)       Update PCP  Update Chief Complaint         History of Present Illness / Problem Focused Workflow     Tosin Cortez presents to the clinic with hospital followup (Pt states she was hospitalized for hypertension)     89 y/o female presents for follow up after discharge from hospital. She was admitted for blood pressure monitoring and management. Her son took her blood pressure at home and it was around 199/100 despite taking routine medications. Denies having any symptoms at the time. She was found to have a UTI inpatient, treated with rocephin. They also added metoprolol 25 mg BID which helped improved blood pressure and heart rate. She presents today with /101, but recheck is 131/80, . She reports that she has continued all of her routine medicines but she did not start taking the metoprolol like she was instructed to do. She did not think it was at the pharmacy. However, we called to verify that the pharmacy received the prescription and someone picked it up for her. She stays with her son, so she is going to check with him. I discussed getting her set up with home health for a little while to make sure that she is able to manage her medicines, they can help organize, get them all together and help her get her blood pressure back under control.  She also had a trip and fall just prior to hospitalization, was stepping up to get into a bus, partially missed the step and scraped left shin. The people behind her caught her so she did not  fall and have any other injuries, but she would like to receive a tetanus shot today. It appears that she has been cleaning it well and it is starting to heal well. No redness or drainage from area of injury.  Discharge meds reconciled with visit      Review of Systems     Review of Systems   Constitutional: Negative.    HENT: Negative.     Eyes: Negative.    Respiratory: Negative.     Cardiovascular: Negative.    Gastrointestinal: Negative.    Endocrine: Negative.    Genitourinary:  Positive for dysuria.   Musculoskeletal:  Positive for gait problem (chronic, from CVA).   Integumentary:  Positive for wound.   Psychiatric/Behavioral: Negative.     All other systems reviewed and are negative.     Medical / Social / Family History     Past Medical History:   Diagnosis Date    Herpes zoster without complication 7/12/2021    Hypertension     Stroke        Past Surgical History:   Procedure Laterality Date    BACK SURGERY N/A     CATARACT EXTRACTION      CHOLECYSTECTOMY      HYSTERECTOMY      JOINT REPLACEMENT         Social History  Ms. Cortez  reports that she has never smoked. She has never used smokeless tobacco. She reports that she does not drink alcohol and does not use drugs.    Family History  Ms.'cliff Cortez family history includes Cancer in her mother and sister; Diabetes in her father and mother; Heart disease in her mother; Hypertension in her mother.    Medications and Allergies     Medications  Outpatient Medications Marked as Taking for the 4/11/23 encounter (Office Visit) with THERON Ibarra   Medication Sig Dispense Refill    atorvastatin (LIPITOR) 80 MG tablet Take 1 tablet (80 mg total) by mouth once daily. 90 tablet 3    clopidogreL (PLAVIX) 75 mg tablet Take 75 mg by mouth once daily.      famotidine (PEPCID) 20 MG tablet Take 20 mg by mouth once daily.      magnesium oxide (MAG-OX) 250 mg magnesium Tab Take 250 mg by mouth once daily.      melatonin (MELATIN) 3 mg tablet Take 3 mg by mouth  nightly.      valsartan (DIOVAN) 80 MG tablet Take 1 tablet (80 mg total) by mouth once daily. 90 tablet 2       Allergies  Review of patient's allergies indicates:  No Known Allergies    Physical Examination     Vitals:    04/11/23 0920   BP: 131/80   Pulse:    Resp:    Temp:      Physical Exam  Vitals and nursing note reviewed.   Constitutional:       Appearance: Normal appearance. She is normal weight.   HENT:      Head: Normocephalic and atraumatic.      Mouth/Throat:      Mouth: Mucous membranes are moist.      Pharynx: Oropharynx is clear.   Eyes:      Extraocular Movements: Extraocular movements intact.      Conjunctiva/sclera: Conjunctivae normal.      Pupils: Pupils are equal, round, and reactive to light.   Cardiovascular:      Rate and Rhythm: Regular rhythm. Tachycardia present.      Pulses: Normal pulses.      Heart sounds: Normal heart sounds.   Pulmonary:      Effort: Pulmonary effort is normal.      Breath sounds: Normal breath sounds.   Abdominal:      General: Abdomen is flat. Bowel sounds are normal.      Palpations: Abdomen is soft.   Musculoskeletal:         General: Normal range of motion.      Cervical back: Normal range of motion and neck supple.   Skin:     General: Skin is warm and dry.      Capillary Refill: Capillary refill takes less than 2 seconds.      Findings: Abrasion present.      Comments: Abrasion to left shin, covers most of this area. Is clean and dry, no redness or drainage noted. Has started to develop scabbing over wound. Healing well.   Neurological:      General: No focal deficit present.      Mental Status: She is alert and oriented to person, place, and time. Mental status is at baseline.      Cranial Nerves: No facial asymmetry.      Motor: Weakness (right sided residual weakness from hx of CVA) present.      Gait: Gait abnormal (ambulatory with walker).   Psychiatric:         Mood and Affect: Mood normal.         Behavior: Behavior normal.         Thought Content:  Thought content normal.         Judgment: Judgment normal.            Assessment and Plan (including Health Maintenance)      Problem List  Smart Sets  Document Outside HM   :    Plan:   Hospital discharge follow-up  -     Comprehensive Metabolic Panel; Future; Expected date: 04/11/2023  -     Urinalysis, Reflex to Urine Culture; Future; Expected date: 04/11/2023  -     Urinalysis, Microscopic  -     Urine culture    Essential hypertension  -     Comprehensive Metabolic Panel; Future; Expected date: 04/11/2023  -     Ambulatory referral/consult to Home Health; Future; Expected date: 04/12/2023    Dysuria  -     Urinalysis, Reflex to Urine Culture; Future; Expected date: 04/11/2023  -     Urinalysis, Microscopic  -     Urine culture    Abrasion of left lower extremity, initial encounter  -     [facility]-Tdap (BOOSTRIX) vaccine 0.5 mL    Medication non-compliance due to excessive pill burden  -     Ambulatory referral/consult to Home Health; Future; Expected date: 04/12/2023    Cerebrovascular accident (CVA) due to stenosis of left posterior cerebral artery  -     Ambulatory referral/consult to Home Health; Future; Expected date: 04/12/2023    Weakness    Gait instability       Recheck labs and urine. Verified prescription status. Home health to coordinate and organize medications at home and monitor blood pressure, etc.  Up dated tetanus due to recent injury    Health Maintenance Due   Topic Date Due    Pneumococcal Vaccines (Age 65+) (1 - PCV) Never done    DEXA Scan  12/03/2022    Colonoscopy  12/12/2022    Mammogram  12/15/2022       Problem List Items Addressed This Visit          Neuro    Cerebrovascular accident (CVA) due to stenosis of left posterior cerebral artery    Overview     Last Assessment & Plan:   Formatting of this note might be different from the original.  History & Physical     Discharge Summary   Follow up with stroke clinic in 2 weeks  Continue asa, plavix and lipitor  Follow-up           Relevant  Orders    Ambulatory referral/consult to Home Health       Renal/    Dysuria    Relevant Orders    Urinalysis, Reflex to Urine Culture (Completed)    Urinalysis, Microscopic (Completed)    Urine culture (Completed)       Other    Weakness    Gait instability     Other Visit Diagnoses       Hospital discharge follow-up    -  Primary    Relevant Orders    Comprehensive Metabolic Panel (Completed)    Urinalysis, Reflex to Urine Culture (Completed)    Urinalysis, Microscopic (Completed)    Urine culture (Completed)    Essential hypertension        Relevant Orders    Comprehensive Metabolic Panel (Completed)    Ambulatory referral/consult to Home Health    Abrasion of left lower extremity, initial encounter        Relevant Medications    [facility]-Tdap (BOOSTRIX) vaccine 0.5 mL (Completed)    Medication non-compliance due to excessive pill burden        Relevant Orders    Ambulatory referral/consult to Home Health            Health Maintenance Topics with due status: Not Due       Topic Last Completion Date    Lipid Panel 05/30/2022    TETANUS VACCINE 04/11/2023       Future Appointments   Date Time Provider Department Center   2/2/2024 11:00 AM AWV NURSE, [facility] PRIMARY CARE [facility] LAUREN Lambert        Patient Instructions   Make sure you start taking the metoprolol that was sent over to your pharmacy. We did verify that it was picked up from the pharmacy on 4/5 after you were discharged from the hospital. So check with your son to put it with your other medicines so you can start taking it daily as well.   I am going to try to set you up with home health so they can come by and check your blood pressure and help manage your blood pressure regimen until I feel that we have got it back on track.   We will call you with your labs from today and let you know if there is anything further we need to treat.    Follow up in about 3 months (around 7/11/2023) for Routine Exam.     Signature:  THERON Ibarra      Date of  encounter: 4/11/23

## 2023-04-11 NOTE — PATIENT INSTRUCTIONS
Make sure you start taking the metoprolol that was sent over to your pharmacy. We did verify that it was picked up from the pharmacy on 4/5 after you were discharged from the hospital. So check with your son to put it with your other medicines so you can start taking it daily as well.   I am going to try to set you up with home health so they can come by and check your blood pressure and help manage your blood pressure regimen until I feel that we have got it back on track.   We will call you with your labs from today and let you know if there is anything further we need to treat.

## 2023-04-14 NOTE — TELEPHONE ENCOUNTER
----- Message from THERON Ibarra sent at 4/14/2023  8:59 AM CDT -----  Urine and urine culture returned, she will need more antibiotics for a UTI. I sent some off to pharmacy, she needs to take full course of antibiotics until completed. If she gets set up with home health, I can have them recheck her urine once she has finished antibiotics.

## 2023-06-20 PROBLEM — F01.A0 MAJOR NEUROCOGNITIVE DISORDER, DUE TO VASCULAR DISEASE, WITHOUT BEHAVIORAL DISTURBANCE, MILD: Chronic | Status: ACTIVE | Noted: 2023-01-01

## 2023-06-20 PROBLEM — F32.0 CURRENT MILD EPISODE OF MAJOR DEPRESSIVE DISORDER WITHOUT PRIOR EPISODE: Status: ACTIVE | Noted: 2023-01-01

## 2023-06-20 NOTE — PROGRESS NOTES
[facility]   Group Therapy Note      Patient Name: Tosin Cortez    Date: 06/20/2023      Group 1    Time:  3540-1965    Number of patients in attendance: 5    Group 1 Intervention: Client-Centered    Objective:  Behavior/Symptoms Addressed: Alert, Cooperative, and Attentive    Affect: Restricted and Mood Congruent    Mood: Depressed    Patient's response to treatment: Good interaction, Limited Insight, Able to follow directions, Responded without prompting, and No danger concerns noted    Comments: In this session, therapist and client discussed client's current issues, explored resolutions and possible outcomes. The purpose of this session was to increase client's openness to their experiences and enhance their understanding of themselves.     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 Leahy will identify and report to staff any signs or symptoms of depressed mood that she is experiencing by target date.  1-B: Patient will attempt 2-3 coping skills to help improve depressive symptoms by target date.    Progress toward goals: No progress     Ms. Leahy's level of engagement was moderate as she initiated dialogue with peers and participated in the group discussion. Therapist inquired about patient's poor attendance and disclosed personal input, sharing, I've just been off and on. Some days I think I'm doing okay and sometimes, I'm not. Therapist encouraged increased participation and will continue to monitor for changes in mood or behaviors and encourage Ms. Leahy's expression of self.      Group 2    Time: 4550-9587    Number of patients in attendance: 5    Group 2 Intervention: Psychoeducation    Objective:  Behavior/Symptoms Addressed: Alert, Cooperative, and Attentive    Affect: Restricted and Mood  Congruent    Mood: Depressed    Patient's response to treatment: Good interaction, Limited Insight, Able to follow directions, Responded without prompting, and No danger concerns noted    Comments: Therapist and clients discussed the consequences of poor sleep and the benefits of sleep hygiene. The purpose of this session was to teach clients some basic sleep tips and discuss current obstacles to a good night's sleep. Therapist taught clients how sleep issues are common among those with mental illness, and correction can be a key part of treatment.     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 Leahy will identify and report to staff any signs or symptoms of depressed mood that she is experiencing by target date.  2-A: Patient will report sleeping at least 4-6 consecutive hours for three out seven nights each week by target date.    Progress toward goals: Limited progress    Ms. Leahy responded to the session by initiating constructive interactions, explaining her problems with sleep. Client reported, I don't sleep very well. It's gotten better recently though. Client continued to explain that she often struggles to fall asleep due to repetitive thinking sharing, They play in my head like a picture show. Therapist provided support and encouragement to express thoughts and feelings and will continue to monitor for changes in mood or behaviors and encourage Ms. Leahy's expression of self.      Group 3    Time: 7818-9582      Number of patients in attendance: 5    Group 3 Intervention: Coping Skills    Objective:  Behavior/Symptoms Addressed: Alert, Cooperative, and Attentive    Affect: Restricted and Mood Congruent    Mood: Depressed    Patient's response to treatment: Good interaction, Limited Insight, Able to follow directions, Required  prompting, and No danger concerns noted    Comments: Therapist and clients reviewed a handout titled, Waking Up Refreshed, and discussed solutions to make mornings better. The purpose of this session was to educate clients on some tips to help clients wake up feeling good, positively combating the negative effects that depression and anxiety can have on sleep. Some tips were also discussed such as stepping outside for some fresh air and getting some sunshine.     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 Leahy will identify and report to staff any signs or symptoms of depressed mood that she is experiencing by target date.  2-A: Patient will report sleeping at least 4-6 consecutive hours for three out seven nights each week by target date.    Progress toward goals: Limited progress    Ms. Leahy was moderately engaged and responded when prompted about ways to improve depressive and anxious symptoms with improved sleep. Client shared her morning routine, stating, First thing is coffee, then I'll have a small breakfast and take my medicine. Then I'll get dressed. Upon further discussion, client shared that she doesn't always feel refreshed or motivated in the mornings. Therapist will continue to monitor for changes in mood or behaviors and encourage Ms. Leahy's expression of self.      Meseret Gonsalez LCSW

## 2023-06-21 NOTE — PROGRESS NOTES
"  Received client via IOP van from home.  Neatly dressed and groomed.  AAO x 3.  Client stated, "i'm just here today."  Client was unable to walk from van to group room with staff obtaining wheelchair and propelling her.  Verbalized weakness.  Reports sleeping "okay" last PM.  Denies falls and pain.  She is aware that she is scheduled to see Dr. Malik today.  CONY Bowman RN 6/21/23 @ 1665    Client participated in 3 group psychotherapy sessions then ate 50% of lunch before being propelled in manual wheelchair to [facility].  Along the way, client stated, "I can't believe i'm having to do this(be in wheelchair)".  RN explained that staff would rather her use wheelchair instead of falling.  Client verbalized that she had thought her strength would return after a few days but it hasn't.  CONY Bowman RN 6/21/23 @ 1246  "

## 2023-06-21 NOTE — PROGRESS NOTES
"Received client via IOP van from home with client ambulating with rollater.  Client reports weakness but refused wheelchair assistance.  She is neatly dressed and groomed, stating, "some days I feel good and some days I don't."  Report diarrhea last week as reason for being absent as well as visiting her son out of town.  Appropriately dressed and groomed.  AAOX 3.  Denies falls or injuries with fall risk goal ongoing.  Snack given per request.--CONY Bowman RN    Client participated in 3 group psychotherapy sessions with Meseret LÓPEZ.  She ate 50% of lunch before being escorted to IOP van, stopping three times before getting to van due to weakness.  RN explained that client could be propelled in wheelchair but she refused, stating, "i'll make it."  Client was assisted on [facility] van by RN, who also buckled her safety belt before transport home.        "

## 2023-06-26 NOTE — PROGRESS NOTES
Milka, , received a call from Patient's Son, that Tosin was not able to attend Group Session this morning. Milka reported that patient said she was sleeping better, when she spoke with Dr. Malik @ Treatment Team last week.

## 2023-06-30 NOTE — PROGRESS NOTES
[facility]   Group Therapy Note      Patient Name: Tosin oCrtez    Date: 06/21/2023      Group 1    Time:  0228-9117    Number of patients in attendance: 5    Group 1 Intervention: Mood Management    Objective:  Behavior/Symptoms Addressed: Alert, Cooperative, and Attentive    Affect: Restricted and Mood Congruent    Mood: Depressed    Patient's response to treatment: Good interaction, Limited Insight, Able to follow directions, Responded without prompting, and No danger concerns noted    Comments: For this session, therapist and clients discussed some symptoms of their depression and reviewed an article called. Depression in Older Adults. The purpose of this session was symptom reduction by educating clients on some causes, signs, and symptoms of depression, in addition to some self-help tips for older adults with depression. Therapist facilitated a discussion about why depression among older adults is often overlooked and encouraged clients to identify some of their specific symptoms.     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 Leahy will identify and report to staff any signs or symptoms of depressed mood that she is experiencing by target date.  1-B: Patient will attempt 2-3 coping skills to help improve depressive symptoms by target date.    Progress toward goals: Minimal progress     Ms. Leahy participated with a moderate level of engagement as she contributed opinions regarding the group topic in addition to feedback among her peers. Client expressed her struggle to cope with loss and feelings of loneliness but was unable to reference ways to mitigate her losses and feelings. Therapist encouraged Ms. Leahy's expression of self and commended her level of participation.       Group 2    Time:  2893-1109    Number of patients in attendance: 5    Group 2 Intervention: Process Group    Objective:  Behavior/Symptoms Addressed: Alert, Cooperative, and Attentive    Affect: Restricted and Mood Congruent    Mood: Depressed    Patient's response to treatment: Good interaction, Increased Insight, Able to follow directions, Responded without prompting, and No danger concerns noted    Comments: This intervention focused on education about anxiety, including various symptoms and consequences of anxiety. The purpose of this session was symptom reduction to decrease or mitigate feelings of anxiety and maladaptive behaviors. Therapist and clients discussed how occasional anxiety can be a normal part of life, but anxiety disorders involve more than temporary worry or fear. Some examples given were related to [employer], mass shootings, and other topics in the news.     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 Leahy will identify and report to staff any signs or symptoms of depressed mood that she is experiencing by target date.  1-B: Patient will attempt 2-3 coping skills to help improve depressive symptoms by target date.    Progress toward goals: Limited progress    Ms. Leahy participated in the group discussion by initiating dialogue and providing constructive feedback. Client stated, A two-mile walk will settle you down, referring to a positive way to control unwanted thoughts or feelings triggered my external sources. Client did not confirm using exercise as a personal coping skill. Therapist will continue to monitor for changes in mood or behaviors and encourage Ms. Leahy's expression of self.      Group 3    Time: 7201-7429      Number of patients in attendance: 5    Group 3 Intervention: Coping Skills    Objective:  Behavior/Symptoms  Addressed: Alert, Cooperative, and Attentive    Affect: Restricted and Mood Congruent    Mood: Depressed    Patient's response to treatment: Good interaction, Limited Insight, Able to follow directions, Required prompting, and No danger concerns noted    Comments: In this session therapist reviewed clients on the fundamental role of healthy coping skills for many forms of therapy. The purpose of this session was to expand client's coping skills by implementing a new one or to record their experiences over the weekend with their current coping skills.    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 Leahy will identify and report to staff any signs or symptoms of depressed mood that she is experiencing by target date.  1-B: Patient will attempt 2-3 coping skills to help improve depressive symptoms by target date.    Progress toward goals: Limited progress    Ms. Leahy continued to eagerly participate in the session expressing comprehension and interest. Client stated, I could try to go to [employer]. Client has shared how influential [employer] has been in her life historically, however reported not attending like usual in over a year. Therapist agreed with client that attending could help improve her mood. Therapist will also continue to monitor for changes in mood or behaviors and encourage Ms. Leahy's expression of self.      Meseret Gonsalez LCSW

## 2023-07-05 PROBLEM — F01.A0 MAJOR NEUROCOGNITIVE DISORDER, DUE TO VASCULAR DISEASE, WITHOUT BEHAVIORAL DISTURBANCE, MILD: Status: ACTIVE | Noted: 2023-01-01

## 2023-07-05 PROBLEM — F32.0 CURRENT MILD EPISODE OF MAJOR DEPRESSIVE DISORDER WITHOUT PRIOR EPISODE: Chronic | Status: ACTIVE | Noted: 2023-01-01

## 2023-07-05 NOTE — PROGRESS NOTES
"  Client participated in 3 group psychotherapy sessions then ate 50% of lunch before being seen by treatment team staff at [facility].  Client stated she spent July 4th alone yesterday.  She reports that when you are 89 y/o, you can't make to many plans because "you never know how you will feel."  Continue medication regimen with no new orders received.  Client was propelled in wheelchair to van where , WILFREDO Pearce, transported client back home.  "

## 2023-07-05 NOTE — PROGRESS NOTES
"  Received client via IOP van from home with RN assisting client in wheelchair due to her inability to walk the distance from IOP van to IOP group room.  She reports weakness, stating her body is "giving out on me."  Denies pain, recent falls or injuries.  Reports compliance with medications.  Snack given per request.  Neatly dressed and groomed.  Reports not being able to leave her house "much" due to declining health, stating, "I would like to get out and see people but I just can't."  "

## 2023-07-05 NOTE — PROGRESS NOTES
[facility]  Outpatient Psychiatric Progress Note  2023       Patient Name: Tosin Cortez  MRN: [id number]   Patient : 10/10/1932  Admission Date: (Not on file)   Chief Complaint: Follow up IOP visit for depression and anxiety.     SUBJECTIVE:   History of Present Illness:   Tosin Cortez is a 89 y.o. female scheduled for IOP attendance at 2 days per week. I discussed her case with the treatment team. The treatment team reports participation at a moderate level. Tosin Cortez has only attended IOP 1 of the last 3 sessions during this review period. When present, she is noted to be attentive, cooperative, and pleasant with peers. She does complain of depressive sx including ruminating thoughts, anhedonia, and anergia. She continues to live alone in her house with her son and the assistance of home health services. Tosin Cortez describes her mood as I have good and bad days. She endorses improved sleep with medication and stable appetite. She reports compliance with medications and denies side effects. She does have physical limitations with generalized weakness and is limited on walking distance. Denies SI/HI/AVHS. S    Review of Systems:   Denies SOB, CP, or abd pain. +generalized weakness.     Current Psychotropic Medications:   Trazodone 100 mg PO QHS (insomnia)    Past Medical and Surgical History:   Past Medical History:   Diagnosis Date    Current mild episode of major depressive disorder without prior episode 2023    Herpes zoster without complication 2021    Hypertension     Major neurocognitive disorder, due to vascular disease, without behavioral disturbance, mild 2023    Stroke        Past Surgical History:   Procedure Laterality Date    BACK SURGERY N/A     CATARACT EXTRACTION      CHOLECYSTECTOMY      HYSTERECTOMY      JOINT REPLACEMENT         OBJECTIVE:     Psychiatric Mental Status Exam:  Appearance: Elderly WF in NAD, well dressed and good hygiene.  "Appears stated age. Glasses.   Behavior: Pleasant, quiet, timid, anxious, Fair eye contact.   Motor/Gait: No TD/EPS. Ambulates with a walker.   Speech: Spontaneous, normal R/R/V.   Mood: "good and bad days"  Affect: Restricted and dysphoric.   Thought Process: Linear.   Thought Content/Perceptions: Denies SI/HI/AVHs. No delusions noted. No internal distraction.   Orientation: AAOx4    ASSESSMENT:   Diagnosis:  Encounter Diagnoses   Name Primary?    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      2. Memory impairment  - Monitor closely and encourage close f/u with PCP for HTN and metabolic parameters to prevent progression of vascular risk factors.   - Consider initiating Aricept vs Namenda if vascular risk factors are not controlled or memory impairment progresses.   - Encourage continued participation in IOP group and individual therapy.      Medications, indications, SEs, risks vs benefits, & alternatives to treatment have been discussed. She verbalizes understanding and consents to the tx plan.     Continue IOP attendance at 2 days per week as patient continues to benefit from therapy groups, behavioral activation, structure, milieu, and medication adjustments which improve her level of functioning in the community.     Continue individual and group therapy as part of IOP therapeutic interventions.      I have reviewed the medical needs, multidisciplinary observations, formulations, and treatment interventions and have provided medical direction for the continued development of this patient's individual treatment plan.    Akanksha Malik MD  [facility]       "

## 2023-07-07 NOTE — PROGRESS NOTES
"[facility] IOP DAILY NURSING NOTE      Name: Tosin Cortez   MRN: [id number]   YOB: 1932; Age: 89 y.o.   Gender: Female                                  Date: 07/07/2023                 Arrival Mode: IOP Van    Behavior: Alert, Oriented, and Cooperative    Physical Condition: Tense, Restless, and Slow    Affect:  bland    Mood: Depressed and Anxious    Thought Processes: Logical    Thought Content:  denies SI/HI, denies hallucinations     Interaction with Staff/Peers: Cooperative    Level of Function: Some Direction    Appetite: Normal Appetite    Sleep:  reports sleeping better, slept 4 hours last HS "which is good for me"    Appearance: well-groomed, appropriate    Attendance: Present for: Group Therapy 1, Group Therapy 2, and Group Therapy 3    Departure: IOP Van    Additional Notes: client was quiet during lunch, engaged when prompted, ate 60% of meal and 100% snack. RN discussed with client that RN contacted [facility] and left message for Dr. Camacho's nurse (Shawna) that client needs refill on Valsartan. RN discussed with client and provided written instructions for client to give to son to contact pharmacy around 2p.m. to see if refill was called in and if Valsartan refill has not been called in family needs to call [facility] again and notify them that refill is needed. Client voiced understanding and placed written note in her pocket to give to son.          Ashley Carlin RN           "

## 2023-07-07 NOTE — PROGRESS NOTES
[facility] Psychotherapy Note      Patient Name: Tosin Cortez    Date: 07/05/2023      Group 1     Time:  0975-5403                        Number of patients in attendance: 5     Group 1 Intervention:  Client-Centered Process    Objective:  Behavior/Symptoms Addressed: Alert, Cooperative, and Attentive    Affect: Restricted and Mood Congruent    Mood: Depressed    Patient's response to treatment: Good interaction, Limited Insight, Increased self-expression, Able to follow directions, Identified issues, Required prompting, and No danger concerns noted    Comments: In this session, therapist and client discussed client's current issues, explored resolutions and possible outcomes. The purpose of this session was to increase client's openness to their experiences and enhance their understanding of themselves.     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 Leahy will identify and report to staff any signs or symptoms of depressed mood that she is experiencing by target date.  1-B: Patient will attempt 2-3 coping skills to help improve depressive symptoms by target date.    Progress toward goals: Minimal progress     Ms. Leahy responded to the session with a moderate level of participation. Client contributed to group discussion related to the topic and described her experiences within the past week. Client identified issues such as isolation/withdrawn behaviors, however she exhibited poor insight regarding behaviors contributing to depressive symptoms. Client mentioned how her family invited her to their fourth of July celebrations, but client stated, My holiday was nice and quietthey invited me to come over, but I stayed in and rested! Upon further inquiry, client shared that I enjoy seeing all of the kids and  lilian, but I just didn't feel like going. Therapist encouraged Ms. Leahy's expression of self and commended her level of participation.       Group 2    Time: 0202-4080    Number of patients in attendance: 5    Group 2 Intervention: Self-Esteem    Objective:  Behavior/Symptoms Addressed: Alert, Cooperative, and Attentive  Affect: Restricted and Mood Congruent    Mood: Depressed    Patient's response to treatment: Minimal interaction, Limited Insight, Able to follow directions, Required prompting, and No danger concerns noted    Comments: In this session, therapist and clients discussed the meaning of identity and reviewed a worksheet titled, Who Am I? The purpose of this session was to assist clients in increasing their meaning and direction about life by exploring and developing a strong identity. Therapist explained how identity is the way we think about, describe, and present ourselves. Therapist also explained how our identity can consist of various roles, traits, and experiences.      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 Leahy will identify and report to staff any signs or symptoms of depressed mood that she is experiencing by target date.  1-B: Patient will attempt 2-3 coping skills to help improve depressive symptoms by target date.    Progress toward goals: No progress    Ms. Leahy responded to the session with a low level of engagement based on poor expression of self and a decrease in interaction. Client did display active listening skills, however she was not able to contribute personal input regarding group content. Client did not name a part of her identity, describe what it meant to her, or rate how strongly she identified with that part. Therapist will continue to monitor Ms. Leahy for changes in mood or behaviors.        Group 3    Time: 6558-9410      Number of patients in attendance: 5    Group 3 Intervention: Behavior Therapy    Objective:  Behavior/Symptoms Addressed: Alert, Cooperative, and Attentive    Affect: Restricted and Mood Congruent    Mood: Depressed    Patient's response to treatment: Minimal interaction, Limited Insight, Able to follow directions, Required prompting, and No danger concerns noted    Comments: In this session, therapist facilitated a group discussion and covered a worksheet titled, Habit Breakdown. The purpose of this session was to provide clients with some guidance for establishing a new habit, by breaking it down into smaller steps, making it easier to manage mentally and practically.       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 Leahy will identify and report to staff any signs or symptoms of depressed mood that she is experiencing by target date.  1-B: Patient will attempt 2-3 coping skills to help improve depressive symptoms by target date.    Progress toward goals: No progress    Ms. Leahy continued to participate at a low level of engagement. Client did not contribute personal input into the group discussion, nor did she appear to benefit from group content. Client still demonstrated active listening skills based upon body posture and eye contact but was unable to identify a behavioral change that she's struggled with currently or in the past. Therapist will continue to monitor Ms. Leahy for changes in mood or behaviors.       Meseret Gonsalez LCSW

## 2023-07-10 PROBLEM — I63.532 CEREBROVASCULAR ACCIDENT (CVA) DUE TO STENOSIS OF LEFT POSTERIOR CEREBRAL ARTERY: Status: RESOLVED | Noted: 2022-06-02 | Resolved: 2023-01-01

## 2023-07-13 NOTE — NURSING
Nurses Note -- 4 Eyes      7/13/2023   12:13 PM      Skin assessed during: Admit      [x] No Altered Skin Integrity Present    [x]Prevention Measures Documented      [] Yes- Altered Skin Integrity Present or Discovered   [] LDA Added if Not in Epic (Describe Wound)   [] New Altered Skin Integrity was Present on Admit and Documented in LDA   [] Wound Image Taken    Wound Care Consulted? No    Attending Nurse:  Meche Canada RN     Second RN/Staff Member:  Ember Cardona RN

## 2023-07-13 NOTE — PLAN OF CARE
[facility]  Initial Discharge Assessment       Primary Care Provider: Zechariah Camacho MD    Admission Diagnosis: No admission diagnoses are documented for this encounter.    Admission Date: 7/13/2023  Expected Discharge Date: TBD    Transition of Care Barriers: None    Payor: MEDICARE / Plan: MEDICARE PART A & B / Product Type: Government /     Extended Emergency Contact Information  Primary Emergency Contact: Justo Cortez  Mobile Phone: [phone]  Relation: Son  Preferred language: English   needed? No    Discharge Plan A: Home with family         [facility] [place] [place] - [address]  [address]  [place] [address]  Phone: [phone] Fax: [phone]      Initial Assessment (most recent)       Adult Discharge Assessment - 07/13/23 1313          Discharge Assessment    Assessment Type Discharge Planning Assessment     Confirmed/corrected address, phone number and insurance Yes     Source of Information patient;family     When was your last doctors appointment? --   april 2023    Communicated FLACO with patient/caregiver Date not available/Unable to determine     Reason For Admission inpatient PT s/p hip dislocation     People in Home child(ivan), adult     Facility Arrived From: [facility]     Do you expect to return to your current living situation? Yes     Do you have help at home or someone to help you manage your care at home? Yes     Who are your caregiver(s) and their phone number(s)? Gil [phone]     Prior to hospitilization cognitive status: Alert/Oriented     Current cognitive status: Alert/Oriented     Walking or Climbing Stairs ambulation difficulty, requires equipment     Do you have any problems with: Errands/Grocery     Home Accessibility wheelchair accessible     Home Layout Able to live on 1st floor     Equipment Currently Used at Home walker, rolling;bedside commode;shower chair     Readmission within 30 days? No      Patient currently being followed by outpatient case management? No     Do you currently have service(s) that help you manage your care at home? No     Do you take prescription medications? Yes     Do you have prescription coverage? Yes     Do you have any problems affording any of your prescribed medications? No     Is the patient taking medications as prescribed? yes     Who is going to help you get home at discharge? Clara     How do you get to doctors appointments? family or friend will provide     Are you on dialysis? No     Do you take coumadin? No     Discharge Plan A Home with family     DME Needed Upon Discharge  other (see comments)   TBD    Discharge Plan discussed with: Patient;Adult children     Transition of Care Barriers None        Physical Activity    On average, how many days per week do you engage in moderate to strenuous exercise (like a brisk walk)? 0 days     On average, how many minutes do you engage in exercise at this level? 0 min        Financial Resource Strain    How hard is it for you to pay for the very basics like food, housing, medical care, and heating? Not hard at all        Housing Stability    In the last 12 months, was there a time when you were not able to pay the mortgage or rent on time? No     In the last 12 months, how many places have you lived? 1     In the last 12 months, was there a time when you did not have a steady place to sleep or slept in a shelter (including now)? No        Transportation Needs    In the past 12 months, has lack of transportation kept you from meetings, work, or from getting things needed for daily living? No        Food Insecurity    Within the past 12 months, you worried that your food would run out before you got the money to buy more. Never true     Within the past 12 months, the food you bought just didn't last and you didn't have money to get more. Never true        Stress    Do you feel stress - tense, restless, nervous, or anxious, or  unable to sleep at night because your mind is troubled all the time - these days? Only a little        Social Connections    In a typical week, how many times do you talk on the phone with family, friends, or neighbors? More than three times a week     How often do you get together with friends or relatives? More than three times a week     How often do you attend Bahai or Worship services? 1 to 4 times per year     Do you belong to any clubs or organizations such as Bahai groups, unions, fraternal or athletic groups, or school groups? No     How often do you attend meetings of the clubs or organizations you belong to? Never     Are you , , , , never , or living with a partner?         Alcohol Use    Q1: How often do you have a drink containing alcohol? Never     Q2: How many drinks containing alcohol do you have on a typical day when you are drinking? Patient does not drink     Q3: How often do you have six or more drinks on one occasion? Never        OTHER    Name(s) of People in Home Gil Cortez is admitted to [facility] for inpatient therapy following a surgical procedure to reduce the R hip after being dislocated. Patient reports this was the 2nd time this has happened. She reports she lives in her home and her son, Gil, lives with her. He assists in her care. She reports she is independent for the most part but does require some assistance. Had [facility] previously but she did not qualify for continued visits. I spoke with Gil to establish a discharge plan and he is unsure if she will return home after this stay. I explained the importance of having a discharge plan in place so when she is ready for dc, she and family can be prepared. He reports he needs to speak with his brother regarding all plan of care. As of now, patient will return to her home and he will continue to live with her, will send referrals for long term  placement if needed. Questions/concerns addressed at time of initial assessment. Will continue to follow.

## 2023-07-13 NOTE — PLAN OF CARE
Problem: Adult Inpatient Plan of Care  Goal: Plan of Care Review  Outcome: Ongoing, Progressing  Goal: Patient-Specific Goal (Individualized)  Outcome: Ongoing, Progressing  Goal: Absence of Hospital-Acquired Illness or Injury  Outcome: Ongoing, Progressing  Goal: Optimal Comfort and Wellbeing  Outcome: Ongoing, Progressing  Goal: Readiness for Transition of Care  Outcome: Ongoing, Progressing     Problem: Cognitive Impairment  Goal: Optimal Cognitive Function  Outcome: Ongoing, Progressing     Problem: Pain Acute  Goal: Acceptable Pain Control and Functional Ability  Outcome: Ongoing, Progressing

## 2023-07-14 PROBLEM — S73.004A HIP DISLOCATION, RIGHT, INITIAL ENCOUNTER: Status: ACTIVE | Noted: 2023-01-01

## 2023-07-14 NOTE — PT/OT/SLP EVAL
Physical Therapy Evaluation    Patient Name:  Tosin Cortez   MRN:  [id number]    Recommendations:     Discharge Recommendations: home health PT   Discharge Equipment Recommendations: none   Barriers to discharge: Inaccessible home    Assessment:     Tosin Cortez is a 89 y.o. female admitted with a medical diagnosis of Frailty syndrome in geriatric patient.  She presents with the following impairments/functional limitations: weakness, impaired endurance, impaired balance, impaired functional mobility, gait instability, decreased lower extremity function, orthopedic precautions .    Pt is s/p right hip reduction on 7/11/23 due to disclocation that occurred when she got OOB at home and stood up. She is WBAT RLE with anterior hip precautions.    Rehab Prognosis: Good; patient would benefit from acute skilled PT services to address these deficits and reach maximum level of function.    Recent Surgery: * No surgery found *      Plan:     During this hospitalization, patient to be seen 5 x/week to address the identified rehab impairments via gait training, therapeutic activities, therapeutic exercises and progress toward the following goals:    Plan of Care Expires:  08/04/23    Subjective     Chief Complaint: Pt stated that she was not on O2 at home.  Patient/Family Comments/goals: DC home with son who lives with her.  Pain/Comfort:  Pain Rating 1: 0/10  Pain Addressed 1: Pre-medicate for activity, Cessation of Activity    Patients cultural, spiritual, Jew conflicts given the current situation: no    Living Environment:  Pt lives in a trailer home with her son. She has 2 steps with rail at entrance. Pt's other son is disabled.  Prior to admission, patients level of function was modif indep with household transfers/amb using RW.  Equipment used at home: walker, rolling, bedside commode, shower chair, hip kit.  DME owned (not currently used): single point cane.  Upon discharge, patient will have assistance from son  and home health.    Objective:     Communicated with Jodie Canada RN and KAMLA Taveras prior to session.  Patient found HOB elevated with oxygen, bed alarm  upon PT entry to room.    General Precautions: Standard, fall  Orthopedic Precautions:RLE weight bearing as tolerated, RLE anterior precautions   Braces:    Respiratory Status: Nasal cannula, flow 1.5 L/min    Exams:  RLE ROM: WFL  RLE Strength: Deficits: hip flex = 3-/5, hip add = 3+/5, knee ext=3+/5, PF=4-/5  LLE ROM: WFL  LLE Strength: Deficits: grossly 4-/5    Functional Mobility:  Bed Mobility:     Rolling Left:  stand by assistance  Scooting: stand by assistance  Supine to Sit: minimum assistance  Sit to Supine: minimum assistance and with RLE due to hip px's  Transfers:     Sit to Stand:  min/mod with rolling walker  Bed to Chair: minimum assistance with  rolling walker  using  Stand Pivot  Gait: 25 ft with min A using RW. Pt has step-to gait pattern due to ant hip px's and limited      AM-PAC 6 CLICK MOBILITY  Total Score:16       Treatment & Education:  Eval completed. Bed ex's RLE with AAROM X 10 reps each: HS, resisted hip add, QS, GS. Reviewed RLE anterior hip precaustions with pt demonstrating understanding. Pt instructed in use of gait belt around right forefoot to assist in lifting RLE onto bed surface, but pt still required min A due to weak UE strength.    Patient left HOB elevated with call button in reach, bed alarm on, and O2 in place .    GOALS:   Multidisciplinary Problems       Physical Therapy Goals          Problem: Physical Therapy    Goal Priority Disciplines Outcome Goal Variances Interventions   Physical Therapy Goal     PT, PT/OT Ongoing, Progressing     Description: LTG - 3 weeks  1. Pt SBA with supine to/from sit using strap or cane to life RLE onto bed surface.  2. Pt will amb 150 ft with SBA using RW.  3. Pt will be SBA with transfers throughout.                         History:     Past Medical History:   Diagnosis Date    Current  mild episode of major depressive disorder without prior episode 2/1/2023    Herpes zoster without complication 7/12/2021    Hypertension     Major neurocognitive disorder, due to vascular disease, without behavioral disturbance, mild 2/1/2023    Stroke        Past Surgical History:   Procedure Laterality Date    BACK SURGERY N/A     CATARACT EXTRACTION      CHOLECYSTECTOMY      HYSTERECTOMY      JOINT REPLACEMENT         Time Tracking:     PT Received On: 07/14/23  PT Start Time: 1301     PT Stop Time: 1324  PT Total Time (min): 23 min     Billable Minutes: Evaluation 15, Therapeutic Exercise 8, and Total Time 23 min      07/14/2023

## 2023-07-14 NOTE — HPI
Mrs. Tosin Cortez is a pleasant 89 y/o  female who has admitted to [facility] for skilled nursing services, PT/OT s/p surgical reduction of a right hip dislocation on 7/11/2023 by Dr. Pena of [facility]. She is doing well this am, No major pain.  Tolerating diet.

## 2023-07-14 NOTE — PLAN OF CARE
Problem: Occupational Therapy  Goal: Occupational Therapy Goal  Description: STG: within 2 weeks  Pt will perform grooming with independence at sinkside from seated  Pt will bathe with setup and mod I sinkside  Pt will perform UE dressing with setup and independently from bedside  Pt will perform LE dressing with setup and mod I from bedside  Pt will sit EOB x 30 min with no assistance  Pt will transfer bed/chair/bsc with mod i  Pt will perform standing task x 3 min with no assistance  Pt will tolerate 35 minutes of tx without fatigue      LT.Restore to max I with self care and mobility.     Outcome: Ongoing, Progressing

## 2023-07-14 NOTE — PT/OT/SLP EVAL
Occupational Therapy Evaluation and Treatment    Name: Tosin Cortez  MRN: [id number]  Admitting Diagnosis: post R hip dislocation and surgical reduction    Recent Surgery: 7/11/2023     Recommendations:     Discharge Recommendations: home, home with home health, home health PT, home health OT  Level of Assistance Recommended: 24 hours supervision  Discharge Equipment Recommendations: none  Barriers to discharge: None    Assessment:     Tosin Cortez is a 89 y.o. female with a medical diagnosis of <principal problem not specified>. She presents with performance deficits affecting function including weakness, impaired endurance, impaired self care skills, impaired functional mobility, gait instability, impaired balance, decreased lower extremity function, orthopedic precautions. Pt is pleasant and agreeable to tx, preparing to return home with her son for now at level of assistance she was receiving prior to this episode of care    Rehab Prognosis: Good; patient would benefit from acute OT services to address these deficits and reach maximum level of function.    Plan:     Patient to be seen 5 x/week to address the above listed problems via self-care/home management, therapeutic activities, therapeutic exercises, wheelchair management/training  Plan of Care Expires: 08/04/23  Plan of Care Reviewed with: patient    Subjective     Chief Complaint: weakness  Patient Comments/Goals: return home with family/son for 24 hour svn as needed and mod I as already in place within home  Pain/Comfort:  Pain Rating 1: 0/10    Patients cultural, spiritual, Gnosticism conflicts given the current situation: no    Social History:  Living Environment: Patient lives with their son in a single story home with number of outside stair(s): 3  Prior Level of Function: Prior to admission, patient was modified independent.  Roles and Routines: Patient was not driving prior to admission.  Equipment Used at Home: walker, rolling, bedside commode,  "shower chair, hip kit  DME owned (not currently used): rolling walker and shower chair  Assistance Upon Discharge: family    Objective:     Communicated with pt and nursing prior to session. Patient found HOB elevated with ankles crossed in bed (no lines or drains) upon OT entry to room.    General Precautions: Standard, fall   Orthopedic Precautions: RLE anterior precautions   Braces: N/A    Respiratory Status: Nasal cannula, flow 2 L/min    Occupational Performance    Gait belt applied - No    Bed Mobility:   Rolling/Turning to Left with contact guard assistance  Scooting anteriorly to EOB to have both feet planted on floor: contact guard assistance  Supine to sit from left side of bed with minimum assistance  Sit to Supine with minimum assistance on left side of bed    Functional Mobility/Transfers:  Sit <> Stand Transfer with contact guard assistance with hand-held assist  Toilet Transfer Step Transfer technique with minimum assistance with hand-held assist  Functional Mobility: na    Activities of Daily Living:  Feeding: independence and post setup  Grooming: stand by assistance  Bathing: minimum assistance  Upper Body Dressing: minimum assistance  Lower Body Dressing: maximal assistance and needs her hip kit equipment which is in room with her at bedside, should be provided to her at adl routine time with svn for cueing as needed  Toileting: contact guard assistance    Cognitive/Visual Perceptual:  Cognitive/Psychosocial Skills:    -     Oriented to: Person and Situation  -     Follows Commands/attention: Follows two-step commands  -     Communication: clear/fluent  -     Memory: Impaired STM and nursing report pt has been disoriented since she got here; OT did not note any major confusion this morning, but will cont to assess  -     Safety awareness/insight to disability: pt appeared to understand safety precautions, but she did state, "just leave that bedside commode close so I can get over there without " "bothering the nurses."  OT advised her to call for nursing upon any need to get OOB for now so we can assess her risk for fall more appropriately before giving her independent status with toileting; pt agreeable.  -     Mood/Affect/Coping skills/emotional control: Appropriate to situation, Cooperative, and Pleasant    Physical Exam:  Balance:    -     Sitting: supervision  -     Standing: minimum assistance  Dominant hand: right, but she tends to use left due to "so many things have happended to that right arm, that I just usually use my left."  Upper Extremity Range of Motion:     -       Right Upper Extremity: Deficits: 50% sh flexion due to weakness  -       Left Upper Extremity: Deficits: 50% sh flexion due to weakness  Upper Extremity Strength:    -       Right Upper Extremity: Deficits: 3/5  -       Left Upper Extremity: Deficits: 3/5   Strength:    -       Right Upper Extremity: Deficits: 3/5  -       Left Upper Extremity: Deficits: 3/5  Fine Motor Coordination:    -       Intact  Gross motor coordination:   WFL    AMPAC 6 Click ADL:  AMPAC Total Score: 18    Treatment & Education:  Educated on the importance of mobility to maximize recovery  Educated on the importance of OOB mobility within safe range in order to decrease adverse effects of prolonged bedrest  Educated on Anterior hip precautions - patient recalled no hip precautions  Educated on use of hip kit during LB dressing  Educated on safety with functional mobility; hand placement to ensure safe transfers to various surfaces in prep for ADLs  Educated on performing functional mobility and ADLs in adherence to orthopedic precautions  Will continue to educate as needed  White board updated in patient's room to reflect level of assistance needed with nursing  Patient is clear to stand pivot transfer with RN/PCT, assist xmin a for OOB mobility with RN  Pt educated on OT role/POC.   Importance of OOB activity with staff assistance.  Importance of " sitting up in the chair throughout the day as tolerated, especially for meals   Safety during functional t/f and mobility with use of RW or w/c as needed, to be placed in room at next availability  Importance of assisting with self-care activities   All questions/concerns answered within OT scope of practice     Patient clear to stand pivot transfer with RN/PCT, assist douglas green .    Patient left HOB elevated with all lines intact, call button in reach, RN notified, and RN to reset bed alarm and replace oxygen nasal cannula as needed for pt; nursing reports pt is having some disorientation.  Will cont to follow up to assess safety and judgement .    GOALS:   Multidisciplinary Problems       Occupational Therapy Goals          Problem: Occupational Therapy    Goal Priority Disciplines Outcome Interventions   Occupational Therapy Goal     OT, PT/OT Ongoing, Progressing    Description: STG: within 2 weeks  Pt will perform grooming with independence at sinkside from seated  Pt will bathe with setup and mod I sinkside  Pt will perform UE dressing with setup and independently from bedside  Pt will perform LE dressing with setup and mod I from bedside  Pt will sit EOB x 30 min with no assistance  Pt will transfer bed/chair/bsc with mod i  Pt will perform standing task x 3 min with no assistance  Pt will tolerate 35 minutes of tx without fatigue      LT.Restore to max I with self care and mobility.                          History:     Past Medical History:   Diagnosis Date    Current mild episode of major depressive disorder without prior episode 2023    Herpes zoster without complication 2021    Hypertension     Major neurocognitive disorder, due to vascular disease, without behavioral disturbance, mild 2023    Stroke          Past Surgical History:   Procedure Laterality Date    BACK SURGERY N/A     CATARACT EXTRACTION      CHOLECYSTECTOMY      HYSTERECTOMY      JOINT REPLACEMENT         Time Tracking:      OT Date of Treatment: 07/14/23  OT Start Time: 0815  OT Stop Time: 0840  OT Total Time (min): 25 min    Billable Minutes: Evaluation 15 and Self Care/Home Management 10    7/14/2023

## 2023-07-14 NOTE — CONSULTS
"[facility]    Clinical Nutrition Assessment          Date: 7/14/2023 12:49 PM    Consult received re: Assess/educate regarding nutritional needs    Tosin Cortez is a 89 y.o. 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       PMH:  has a past medical history of Current mild episode of major depressive disorder without prior episode (2/1/2023), Herpes zoster without complication (7/12/2021), Hypertension, Major neurocognitive disorder, due to vascular disease, without behavioral disturbance, mild (2/1/2023), and Stroke.    Nutrition/Diet History  Spiritual, Cultural Beliefs, Christianity Practices, Values that Affect Care: no    Diet Order: Diet Adult Regular (IDDSI Level 7)  Oral Supplements: none       Anthropometrics:   Height: 5' 5" (165.1 cm)  Weight: 75.8 kg (167 lb 1.6 oz)  IBW: 125#   BMI (Calculated): 27.8  BMI Classification: WNL (per range for geriatric age)     7/14/23: eating 50-75% meals. Adequate.    Labs:   Recent Labs   Lab 07/14/23  0527      K 4.0   BUN 7   CREATININE 0.63   *   CALCIUM 8.2*        Last A1c:   Lab Results   Component Value Date    HGBA1C 4.8 05/30/2022     Lab Results   Component Value Date    RBC 4.33 07/14/2023    HGB 14.3 07/14/2023    HCT 42.9 07/14/2023    MCV 99.1 (H) 07/14/2023    MCH 33.0 (H) 07/14/2023    MCHC 33.3 07/14/2023       Meds:   Scheduled Meds:   aspirin  81 mg Oral Daily    atorvastatin  80 mg Oral Daily    clopidogreL  75 mg Oral Daily    colesevelam  625 mg Oral BID WM    " losartan  50 mg Oral Daily    metoprolol tartrate  25 mg Oral BID    traZODone  100 mg Oral QHS     Continuous Infusions: none     PRN Meds:.acetaminophen, calcium carbonate, melatonin, oxyCODONE-acetaminophen, senna-docusate 8.6-50 mg    Physical Review:  General: 2L NC, Glasses, alert, disoriented to time and situation   Abd/GI: WDL   Last Bowel Movement: 23  Ext:  +1 trace edema   Skin: no skin breakdown noted     Vinh Score:   Vinh Risk Assessment  Sensory Perception: 4-->no impairment  Moisture: 3-->occasionally moist  Activity: 3-->walks occasionally (with assist)  Mobility: 3-->slightly limited  Nutrition: 3-->adequate  Friction and Shear: 2-->potential problem  Vinh Score: 18    Malnutrition Screening Tool  Have you recently lost weight without trying?: No  Weight loss score: 0  Have you been eating poorly because of a decreased appetite?: No  Appetite score: 0  MST Score: 0    Estimated Nutrition Needs:   20-25 kcal/[id number] kcal/day  1-1.2 g pro/k-91 g protein/day   4211-6436 ml fluid/day    Nutrition Diagnosis:  No pertinent nutrition dx at this time     Recommendations/ Intervention:  Continue current diet as tolerated    Weekly weights    RD to monitor po intake,wt,nutrition-related labs, and meds      Nutrition Risk: moderate    Signed  Shameka Olea, MS, RD, LD  Available via [email or web address]

## 2023-07-14 NOTE — PLAN OF CARE
Problem: Adult Inpatient Plan of Care  Goal: Plan of Care Review  Outcome: Ongoing, Progressing  Goal: Patient-Specific Goal (Individualized)  Outcome: Ongoing, Progressing  Goal: Absence of Hospital-Acquired Illness or Injury  Outcome: Ongoing, Progressing  Goal: Optimal Comfort and Wellbeing  Outcome: Ongoing, Progressing  Goal: Readiness for Transition of Care  Outcome: Ongoing, Progressing     Problem: Pain Acute  Goal: Acceptable Pain Control and Functional Ability  Outcome: Ongoing, Progressing     Problem: Fall Injury Risk  Goal: Absence of Fall and Fall-Related Injury  Outcome: Ongoing, Progressing     Problem: Skin Injury Risk Increased  Goal: Skin Health and Integrity  Outcome: Ongoing, Progressing

## 2023-07-14 NOTE — SUBJECTIVE & OBJECTIVE
Past Medical History:   Diagnosis Date    Current mild episode of major depressive disorder without prior episode 2/1/2023    Herpes zoster without complication 7/12/2021    Hypertension     Major neurocognitive disorder, due to vascular disease, without behavioral disturbance, mild 2/1/2023    Stroke        Past Surgical History:   Procedure Laterality Date    BACK SURGERY N/A     CATARACT EXTRACTION      CHOLECYSTECTOMY      HYSTERECTOMY      JOINT REPLACEMENT         Review of patient's allergies indicates:  No Known Allergies    No current facility-administered medications on file prior to encounter.     Current Outpatient Medications on File Prior to Encounter   Medication Sig    aspirin 81 MG Chew Take 81 mg by mouth once daily.    atorvastatin (LIPITOR) 80 MG tablet Take 1 tablet (80 mg total) by mouth once daily.    clopidogreL (PLAVIX) 75 mg tablet Take 75 mg by mouth once daily.    colesevelam (WELCHOL) 625 mg tablet Take 625 mg by mouth 2 (two) times daily with meals.    metoprolol tartrate (LOPRESSOR) 25 MG tablet Take 1 tablet (25 mg total) by mouth 2 (two) times daily.    traZODone (DESYREL) 100 MG tablet Take 100 mg by mouth every evening.    famotidine (PEPCID) 20 MG tablet Take 20 mg by mouth once daily.    magnesium oxide (MAG-OX) 250 mg magnesium Tab Take 250 mg by mouth once daily.    oxyCODONE-acetaminophen (PERCOCET) 5-325 mg per tablet Take 1 tablet by mouth every 4 (four) hours as needed for Pain.    valsartan (DIOVAN) 80 MG tablet Take 1 tablet (80 mg total) by mouth once daily.     Family History       Problem Relation (Age of Onset)    Cancer Mother, Sister    Diabetes Mother, Father    Heart disease Mother    Hypertension Mother          Tobacco Use    Smoking status: Never    Smokeless tobacco: Never   Substance and Sexual Activity    Alcohol use: Never    Drug use: Never    Sexual activity: Not Currently     Partners: Male     Review of Systems   Constitutional:  Negative for appetite  change, chills and fever.   Respiratory:  Negative for cough, shortness of breath and wheezing.    Cardiovascular:  Negative for chest pain, palpitations and leg swelling.   Gastrointestinal:  Negative for abdominal distention, diarrhea, nausea and vomiting.   Genitourinary:  Negative for dysuria.   Musculoskeletal:  Positive for arthralgias and gait problem.   Skin:  Negative for rash.   Neurological:  Positive for weakness. Negative for dizziness, seizures and syncope.   Psychiatric/Behavioral:  Negative for agitation, behavioral problems and confusion.    All other systems reviewed and are negative.  Objective:     Vital Signs (Most Recent):  Temp: 97.7 °F (36.5 °C) (07/14/23 0741)  Pulse: 90 (07/14/23 0741)  Resp: 20 (07/14/23 0741)  BP: (!) 176/90 (07/14/23 0741)  SpO2: 95 % (07/14/23 1109) Vital Signs (24h Range):  Temp:  [97.7 °F (36.5 °C)-99.1 °F (37.3 °C)] 97.7 °F (36.5 °C)  Pulse:  [89-95] 90  Resp:  [17-20] 20  SpO2:  [95 %-97 %] 95 %  BP: (176-177)/(82-90) 176/90     Weight: 75.8 kg (167 lb 1.6 oz)  Body mass index is 27.81 kg/m².     Physical Exam  Vitals reviewed.   Constitutional:       General: She is not in acute distress.     Appearance: Normal appearance.   HENT:      Head: Normocephalic and atraumatic.   Eyes:      General: No scleral icterus.     Extraocular Movements: Extraocular movements intact.      Conjunctiva/sclera: Conjunctivae normal.      Pupils: Pupils are equal, round, and reactive to light.   Cardiovascular:      Rate and Rhythm: Normal rate and regular rhythm.      Heart sounds: No murmur heard.    No friction rub. No gallop.   Pulmonary:      Effort: Pulmonary effort is normal. No respiratory distress.      Breath sounds: Normal breath sounds. No wheezing or rales.   Abdominal:      General: Abdomen is flat. Bowel sounds are normal. There is no distension.      Palpations: Abdomen is soft.      Tenderness: There is no abdominal tenderness. There is no guarding.   Musculoskeletal:          General: No swelling.   Skin:     General: Skin is warm and dry.      Coloration: Skin is not jaundiced.      Findings: No rash.   Neurological:      General: No focal deficit present.      Mental Status: She is alert and oriented to person, place, and time.      Sensory: No sensory deficit.      Motor: Weakness present.   Psychiatric:         Mood and Affect: Mood normal.         Thought Content: Thought content normal.            CRANIAL NERVES     CN III, IV, VI   Pupils are equal, round, and reactive to light.     Significant Labs: All pertinent labs within the past 24 hours have been reviewed.  Recent Lab Results         07/14/23  0527        Anion Gap 4       Baso # 0.01       Basophil % 0.2       BUN 7       BUN/CREAT RATIO 11       Calcium 8.2       Chloride 100       CO2 40       Creatinine 0.63       Differential Type Auto       eGFR 84       Eos # 0.00       Eosinophil % 0.0       Glucose 119       Hematocrit 42.9       Hemoglobin 14.3       Lymph # 1.74       Lymph % 31.6       MCH 33.0       MCHC 33.3       MCV 99.1       Mono # 0.82       Mono % 14.9       MPV 10.4       Neutrophils, Abs 2.93       Neutrophils Relative 53.3       Platelets 100       Potassium 4.0       RBC 4.33       RDW 13.2       Sodium 140       WBC 5.50               Significant Imaging: I have reviewed all pertinent imaging results/findings within the past 24 hours.

## 2023-07-14 NOTE — H&P
[facility]  [facility] Medicine  History & Physical    Patient Name: Tosin Cortez  MRN: [id number]  Patient Class: IP- Swing  Admission Date: 7/13/2023  Attending Physician: Won Plasencia DO   Primary Care Provider: Zechariah Camacho MD         Patient information was obtained from patient and ER records.     Subjective:     Principal Problem:Frailty syndrome in geriatric patient    Chief Complaint: No chief complaint on file.       HPI: Mrs. Tosin Cortez is a pleasant 89 y/o  female who has admitted to [facility] for skilled nursing services, PT/OT s/p surgical reduction of a right hip dislocation on 7/11/2023 by Dr. Pena of [facility]. She is doing well this am, No major pain.  Tolerating diet.       Past Medical History:   Diagnosis Date    Current mild episode of major depressive disorder without prior episode 2/1/2023    Herpes zoster without complication 7/12/2021    Hypertension     Major neurocognitive disorder, due to vascular disease, without behavioral disturbance, mild 2/1/2023    Stroke        Past Surgical History:   Procedure Laterality Date    BACK SURGERY N/A     CATARACT EXTRACTION      CHOLECYSTECTOMY      HYSTERECTOMY      JOINT REPLACEMENT         Review of patient's allergies indicates:  No Known Allergies    No current facility-administered medications on file prior to encounter.     Current Outpatient Medications on File Prior to Encounter   Medication Sig    aspirin 81 MG Chew Take 81 mg by mouth once daily.    atorvastatin (LIPITOR) 80 MG tablet Take 1 tablet (80 mg total) by mouth once daily.    clopidogreL (PLAVIX) 75 mg tablet Take 75 mg by mouth once daily.    colesevelam (WELCHOL) 625 mg tablet Take 625 mg by mouth 2 (two) times daily with meals.    metoprolol tartrate (LOPRESSOR) 25 MG tablet Take 1 tablet (25 mg total) by mouth 2 (two) times daily.    traZODone (DESYREL) 100 MG tablet Take 100 mg by  mouth every evening.    famotidine (PEPCID) 20 MG tablet Take 20 mg by mouth once daily.    magnesium oxide (MAG-OX) 250 mg magnesium Tab Take 250 mg by mouth once daily.    oxyCODONE-acetaminophen (PERCOCET) 5-325 mg per tablet Take 1 tablet by mouth every 4 (four) hours as needed for Pain.    valsartan (DIOVAN) 80 MG tablet Take 1 tablet (80 mg total) by mouth once daily.     Family History       Problem Relation (Age of Onset)    Cancer Mother, Sister    Diabetes Mother, Father    Heart disease Mother    Hypertension Mother          Tobacco Use    Smoking status: Never    Smokeless tobacco: Never   Substance and Sexual Activity    Alcohol use: Never    Drug use: Never    Sexual activity: Not Currently     Partners: Male     Review of Systems   Constitutional:  Negative for appetite change, chills and fever.   Respiratory:  Negative for cough, shortness of breath and wheezing.    Cardiovascular:  Negative for chest pain, palpitations and leg swelling.   Gastrointestinal:  Negative for abdominal distention, diarrhea, nausea and vomiting.   Genitourinary:  Negative for dysuria.   Musculoskeletal:  Positive for arthralgias and gait problem.   Skin:  Negative for rash.   Neurological:  Positive for weakness. Negative for dizziness, seizures and syncope.   Psychiatric/Behavioral:  Negative for agitation, behavioral problems and confusion.    All other systems reviewed and are negative.  Objective:     Vital Signs (Most Recent):  Temp: 97.7 °F (36.5 °C) (07/14/23 0741)  Pulse: 90 (07/14/23 0741)  Resp: 20 (07/14/23 0741)  BP: (!) 176/90 (07/14/23 0741)  SpO2: 95 % (07/14/23 1109) Vital Signs (24h Range):  Temp:  [97.7 °F (36.5 °C)-99.1 °F (37.3 °C)] 97.7 °F (36.5 °C)  Pulse:  [89-95] 90  Resp:  [17-20] 20  SpO2:  [95 %-97 %] 95 %  BP: (176-177)/(82-90) 176/90     Weight: 75.8 kg (167 lb 1.6 oz)  Body mass index is 27.81 kg/m².     Physical Exam  Vitals reviewed.   Constitutional:       General: She is not in  acute distress.     Appearance: Normal appearance.   HENT:      Head: Normocephalic and atraumatic.   Eyes:      General: No scleral icterus.     Extraocular Movements: Extraocular movements intact.      Conjunctiva/sclera: Conjunctivae normal.      Pupils: Pupils are equal, round, and reactive to light.   Cardiovascular:      Rate and Rhythm: Normal rate and regular rhythm.      Heart sounds: No murmur heard.    No friction rub. No gallop.   Pulmonary:      Effort: Pulmonary effort is normal. No respiratory distress.      Breath sounds: Normal breath sounds. No wheezing or rales.   Abdominal:      General: Abdomen is flat. Bowel sounds are normal. There is no distension.      Palpations: Abdomen is soft.      Tenderness: There is no abdominal tenderness. There is no guarding.   Musculoskeletal:         General: No swelling.   Skin:     General: Skin is warm and dry.      Coloration: Skin is not jaundiced.      Findings: No rash.   Neurological:      General: No focal deficit present.      Mental Status: She is alert and oriented to person, place, and time.      Sensory: No sensory deficit.      Motor: Weakness present.   Psychiatric:         Mood and Affect: Mood normal.         Thought Content: Thought content normal.            CRANIAL NERVES     CN III, IV, VI   Pupils are equal, round, and reactive to light.     Significant Labs: All pertinent labs within the past 24 hours have been reviewed.  Recent Lab Results         07/14/23  0527        Anion Gap 4       Baso # 0.01       Basophil % 0.2       BUN 7       BUN/CREAT RATIO 11       Calcium 8.2       Chloride 100       CO2 40       Creatinine 0.63       Differential Type Auto       eGFR 84       Eos # 0.00       Eosinophil % 0.0       Glucose 119       Hematocrit 42.9       Hemoglobin 14.3       Lymph # 1.74       Lymph % 31.6       MCH 33.0       MCHC 33.3       MCV 99.1       Mono # 0.82       Mono % 14.9       MPV 10.4       Neutrophils, Abs 2.93        Neutrophils Relative 53.3       Platelets 100       Potassium 4.0       RBC 4.33       RDW 13.2       Sodium 140       WBC 5.50               Significant Imaging: I have reviewed all pertinent imaging results/findings within the past 24 hours.    Assessment/Plan:     * Frailty syndrome in geriatric patient  PT/OT for safety       Hip dislocation, right, initial encounter  PT and OT for physical strengthening        Current mild episode of major depressive disorder without prior episode  Patient has persistent depression which is mild and is currently controlled. Will Continue anti-depressant medications. We will not consult psychiatry at this time. Patient does not display psychosis at this time. Continue to monitor closely and adjust plan of care as needed.        Chronic low back pain without sciatica  Prn meds      HTN (hypertension)  Check home meds and resume.       VTE Risk Mitigation (From admission, onward)         Ordered     IP VTE HIGH RISK PATIENT  Once         07/13/23 1547     Place sequential compression device  Until discontinued         07/13/23 1547                           Won Plasencia DO  Department of Hospital Medicine  [facility]

## 2023-07-14 NOTE — PT/OT/SLP EVAL
SLP Screen    Date:7/14/23    SLP Screen completed this date, per chart pt at baseline for mental status and tolerating current diet. No skilled ST services warranted at this time. Reconsult ST upon change in status.     Risa Wheeler M.S. [facility]-SLP

## 2023-07-14 NOTE — PLAN OF CARE
Problem: Physical Therapy  Goal: Physical Therapy Goal  Description: LTG - 3 weeks  1. Pt SBA with supine to/from sit using strap or cane to life RLE onto bed surface.  2. Pt will amb 150 ft with SBA using RW.  3. Pt will be SBA with transfers throughout.    Outcome: Ongoing, Progressing

## 2023-07-14 NOTE — PROGRESS NOTES
[facility]  Hospital Medicine  Progress Note    Patient Name: Tosin Cortez  MRN: [id number]  Patient Class: IP- Swing   Admission Date: 7/13/2023  Length of Stay: 1 days  Attending Physician: Won Plasencia DO  Primary Care Provider: Zechariah Camacho MD        Subjective:     Principal Problem:<principal problem not specified>    Interval History: Mrs. Tosin Cortez is a pleasant 89 y/o  female who has admitted to [facility] for skilled nursing services, PT/OT s/p surgical reduction of a right hip dislocation on 7/11/2023 by Dr. Pena of [facility].     Review of Systems   Constitutional:  Positive for activity change.   HENT: Negative.     Eyes: Negative.    Respiratory: Negative.     Cardiovascular: Negative.    Gastrointestinal: Negative.    Endocrine: Negative.    Genitourinary: Negative.    Musculoskeletal:  Positive for gait problem and joint swelling.   Skin:  Positive for wound.   Allergic/Immunologic: Negative.    Hematological: Negative.    Psychiatric/Behavioral: Negative.     Objective:     Vital Signs (Most Recent):  Temp: 97.7 °F (36.5 °C) (07/14/23 0741)  Pulse: 90 (07/14/23 0741)  Resp: 20 (07/14/23 0741)  BP: (!) 176/90 (07/14/23 0741)  SpO2: 96 % (07/14/23 0741) Vital Signs (24h Range):  Temp:  [97.7 °F (36.5 °C)-99.1 °F (37.3 °C)] 97.7 °F (36.5 °C)  Pulse:  [89-97] 90  Resp:  [17-20] 20  SpO2:  [92 %-97 %] 96 %  BP: (164-177)/(76-90) 176/90     Weight: 75.8 kg (167 lb 1.6 oz)  Body mass index is 27.81 kg/m².    Intake/Output Summary (Last 24 hours) at 7/14/2023 0820  Last data filed at 7/14/2023 0606  Gross per 24 hour   Intake 420 ml   Output 2 ml   Net 418 ml      Physical Exam  Vitals and nursing note reviewed.   Constitutional:       General: She is not in acute distress.     Appearance: Normal appearance. She is normal weight. She is not ill-appearing, toxic-appearing or diaphoretic.   HENT:      Head: Normocephalic and  atraumatic.      Right Ear: Tympanic membrane, ear canal and external ear normal. There is no impacted cerumen.      Left Ear: Tympanic membrane, ear canal and external ear normal. There is no impacted cerumen.      Nose: Nose normal. No congestion or rhinorrhea.      Mouth/Throat:      Mouth: Mucous membranes are moist.      Pharynx: Oropharynx is clear. No oropharyngeal exudate or posterior oropharyngeal erythema.   Eyes:      General: No scleral icterus.        Right eye: No discharge.         Left eye: No discharge.      Extraocular Movements: Extraocular movements intact.      Conjunctiva/sclera: Conjunctivae normal.      Pupils: Pupils are equal, round, and reactive to light.   Neck:      Vascular: No carotid bruit.   Cardiovascular:      Rate and Rhythm: Normal rate and regular rhythm.      Pulses: Normal pulses.      Heart sounds: Normal heart sounds. No murmur heard.    No friction rub. No gallop.   Pulmonary:      Effort: Pulmonary effort is normal. No respiratory distress.      Breath sounds: Normal breath sounds. No stridor. No wheezing, rhonchi or rales.   Chest:      Chest wall: No tenderness.   Abdominal:      General: Abdomen is flat. Bowel sounds are normal. There is no distension.      Palpations: Abdomen is soft. There is no mass.      Tenderness: There is no abdominal tenderness. There is no right CVA tenderness, left CVA tenderness, guarding or rebound.      Hernia: No hernia is present.   Musculoskeletal:         General: Tenderness present. No swelling, deformity or signs of injury.      Cervical back: Normal range of motion and neck supple. No rigidity or tenderness.      Right lower leg: Edema present.   Lymphadenopathy:      Cervical: No cervical adenopathy.   Skin:     General: Skin is warm.      Capillary Refill: Capillary refill takes less than 2 seconds.      Coloration: Skin is not jaundiced or pale.      Findings: No bruising, erythema, lesion or rash.   Neurological:      General: No  focal deficit present.      Mental Status: She is alert. Mental status is at baseline. She is disoriented.      Cranial Nerves: No cranial nerve deficit.      Sensory: No sensory deficit.      Motor: No weakness.      Coordination: Coordination normal.      Gait: Gait normal.      Deep Tendon Reflexes: Reflexes normal.   Psychiatric:         Mood and Affect: Mood normal.         Behavior: Behavior normal.         Thought Content: Thought content normal.         Judgment: Judgment normal.         Overview/Hospital Course:     Significant Labs: All pertinent labs within the past 24 hours have been reviewed.    Significant Imaging: I have reviewed all pertinent imaging results/findings within the past 24 hours.    Assessment/Plan: PT/OT consult and treatment       Active Diagnoses:    Diagnosis Date Noted POA    Hip dislocation, right, initial encounter [S73.004A] 07/14/2023 Unknown      Problems Resolved During this Admission:     VTE Risk Mitigation (From admission, onward)           Ordered     IP VTE HIGH RISK PATIENT  Once         07/13/23 1547     Place sequential compression device  Until discontinued         07/13/23 1547                       THERON Hook  Department of Hospital Medicine   [facility]

## 2023-07-15 NOTE — PLAN OF CARE
Problem: Adult Inpatient Plan of Care  Goal: Plan of Care Review  Outcome: Ongoing, Progressing  Goal: Patient-Specific Goal (Individualized)  Outcome: Ongoing, Progressing  Goal: Absence of Hospital-Acquired Illness or Injury  Outcome: Ongoing, Progressing  Goal: Optimal Comfort and Wellbeing  Outcome: Ongoing, Progressing  Goal: Readiness for Transition of Care  Outcome: Ongoing, Progressing     Problem: Cognitive Impairment  Goal: Optimal Cognitive Function  Outcome: Ongoing, Progressing     Problem: Pain Acute  Goal: Acceptable Pain Control and Functional Ability  Outcome: Ongoing, Progressing     Problem: Fall Injury Risk  Goal: Absence of Fall and Fall-Related Injury  Outcome: Ongoing, Progressing     Problem: Skin Injury Risk Increased  Goal: Skin Health and Integrity  Outcome: Ongoing, Progressing

## 2023-07-16 NOTE — PLAN OF CARE
Problem: Pain Acute  Goal: Acceptable Pain Control and Functional Ability  Outcome: Ongoing, Progressing  Intervention: Develop Pain Management Plan  Flowsheets (Taken 7/16/2023 1709)  Pain Management Interventions:   care clustered   pillow support provided   position adjusted   quiet environment facilitated  Intervention: Prevent or Manage Pain  Flowsheets (Taken 7/16/2023 1709)  Sensory Stimulation Regulation:   television on   quiet environment promoted  Bowel Elimination Promotion:   commode/bedpan at bedside   privacy promoted  Medication Review/Management: medications reviewed

## 2023-07-17 NOTE — PT/OT/SLP PROGRESS
"Physical Therapy Treatment    Patient Name:  Tosin Cortez   MRN:  [id number]    Recommendations:     Discharge Recommendations: home health PT  Discharge Equipment Recommendations: none  Barriers to discharge: Inaccessible home and Decreased caregiver support    Assessment:     Tosin Cortez is a 89 y.o. female admitted with a medical diagnosis of Frailty syndrome in geriatric patient.  She presents with the following impairments/functional limitations: weakness, impaired endurance, gait instability, impaired balance, impaired functional mobility, decreased lower extremity function, impaired cognition, orthopedic precautions .    Rehab Prognosis: Good; patient would benefit from acute skilled PT services to address these deficits and reach maximum level of function.    Recent Surgery: * No surgery found *      Plan:     During this hospitalization, patient to be seen 5 x/week to address the identified rehab impairments via gait training, therapeutic activities, therapeutic exercises and progress toward the following goals:    Plan of Care Expires:  08/04/23    Subjective     Chief Complaint: Pt c/o, "I've never been this weak."  Patient/Family Comments/goals: pt to dc home when more functional.  Pain/Comfort:  Pain Rating 1: 0/10      Objective:     Communicated with Jodie Canada RN prior to session. Pt has new dc of UTI.  Patient found HOB elevated with oxygen upon PT entry to room.     General Precautions: Standard, fall, hearing impaired  Orthopedic Precautions: RLE weight bearing as tolerated, RLE anterior precautions  Braces:    Respiratory Status: Nasal cannula, flow 2 L/min     Functional Mobility:  Bed Mobility:     Rolling Left:  supervision  Scooting: stand by assistance  Supine to Sit: minimum assistance  Transfers:     Sit to Stand:  stand by assistance with rolling walker  Toilet Transfer: minimum assistance with  no AD  using  Squat Pivot and bsc  Gait: Pt amb'd x 35 ft using RW with CGA and wc trail. "       AM-PAC 6 CLICK MOBILITY  Turning over in bed (including adjusting bedclothes, sheets and blankets)?: 4  Sitting down on and standing up from a chair with arms (e.g., wheelchair, bedside commode, etc.): 4  Moving from lying on back to sitting on the side of the bed?: 3  Moving to and from a bed to a chair (including a wheelchair)?: 3  Need to walk in hospital room?: 3  Climbing 3-5 steps with a railing?: 1  Basic Mobility Total Score: 18       Treatment & Education:  Bed ex's x 20 reps RLE: SAQ, resisted hip add using towel roll, AP.  Pt transferred bed to [facility] with min A to toilet. She stood and wiped herself with RW for support and then as PT applied diaper. Pt transferred BSC to  using RW with CGA.    Patient left up in chair with call button in reach..    GOALS:   Multidisciplinary Problems       Physical Therapy Goals          Problem: Physical Therapy    Goal Priority Disciplines Outcome Goal Variances Interventions   Physical Therapy Goal     PT, PT/OT Ongoing, Progressing     Description: LTG - 3 weeks  1. Pt SBA with supine to/from sit using strap or cane to life RLE onto bed surface.  2. Pt will amb 150 ft with SBA using RW.  3. Pt will be SBA with transfers throughout.                         Time Tracking:     PT Received On: 07/17/23  PT Start Time: 1511     PT Stop Time: 1531  PT Total Time (min): 20 min     Billable Minutes: Gait Training 8, Therapeutic Activity 6, Therapeutic Exercise 6, and Total Time 20 min    Treatment Type: Treatment  PT/PTA: PT     Number of PTA visits since last PT visit: 0     07/17/2023

## 2023-07-17 NOTE — SUBJECTIVE & OBJECTIVE
Interval History: no major issues, working with therapy     Review of Systems   Respiratory:  Negative for shortness of breath.    Cardiovascular:  Negative for chest pain.   Gastrointestinal:  Negative for abdominal pain, nausea and vomiting.   Neurological:  Negative for weakness.   Psychiatric/Behavioral:  Negative for agitation and confusion.    All other systems reviewed and are negative.  Objective:     Vital Signs (Most Recent):  Temp: 97.9 °F (36.6 °C) (07/17/23 0803)  Pulse: 91 (07/17/23 0835)  Resp: 18 (07/17/23 0835)  BP: (!) 188/87 (07/17/23 0803)  SpO2: (!) 93 % (07/17/23 0835) Vital Signs (24h Range):  Temp:  [97.9 °F (36.6 °C)] 97.9 °F (36.6 °C)  Pulse:  [77-91] 91  Resp:  [18-20] 18  SpO2:  [93 %-96 %] 93 %  BP: (171-188)/(79-87) 188/87     Weight: 75.8 kg (167 lb 1.6 oz)  Body mass index is 27.81 kg/m².    Intake/Output Summary (Last 24 hours) at 7/17/2023 1220  Last data filed at 7/17/2023 0748  Gross per 24 hour   Intake 1323 ml   Output --   Net 1323 ml         Physical Exam  Vitals reviewed.   Constitutional:       General: She is not in acute distress.     Appearance: Normal appearance.   HENT:      Head: Normocephalic and atraumatic.   Eyes:      General: No scleral icterus.     Extraocular Movements: Extraocular movements intact.      Conjunctiva/sclera: Conjunctivae normal.      Pupils: Pupils are equal, round, and reactive to light.   Cardiovascular:      Rate and Rhythm: Normal rate and regular rhythm.      Heart sounds: No murmur heard.    No friction rub. No gallop.   Pulmonary:      Effort: Pulmonary effort is normal. No respiratory distress.      Breath sounds: Normal breath sounds. No wheezing or rales.   Abdominal:      General: Abdomen is flat. Bowel sounds are normal. There is no distension.      Palpations: Abdomen is soft.      Tenderness: There is no abdominal tenderness. There is no guarding.   Musculoskeletal:         General: No swelling.   Skin:     General: Skin is warm and  dry.      Coloration: Skin is not jaundiced.      Findings: No rash.   Neurological:      General: No focal deficit present.      Mental Status: She is alert and oriented to person, place, and time.      Sensory: No sensory deficit.      Motor: Weakness present.   Psychiatric:         Mood and Affect: Mood normal.         Thought Content: Thought content normal.           Significant Labs: All pertinent labs within the past 24 hours have been reviewed.  Recent Lab Results       None            Significant Imaging: I have reviewed all pertinent imaging results/findings within the past 24 hours.

## 2023-07-17 NOTE — PROGRESS NOTES
[facility] Surgical Unit  Hospital Medicine  Progress Note    Patient Name: Tosin Cortez  MRN: [id number]  Patient Class: IP- Swing   Admission Date: 7/13/2023  Length of Stay: 4 days  Attending Physician: Won Plasencia DO  Primary Care Provider: Zechariah Camacho MD        Subjective:     Principal Problem:Frailty syndrome in geriatric patient        HPI:  Mrs. Tosin Cortez is a pleasant 89 y/o  female who has admitted to [facility] for skilled nursing services, PT/OT s/p surgical reduction of a right hip dislocation on 7/11/2023 by Dr. Pena of [facility]. She is doing well this am, No major pain.  Tolerating diet.       Overview/Hospital Course:  7/17 Seems to be doing well, UA was abnormal and started on Keflex.       Interval History: no major issues, working with therapy     Review of Systems   Respiratory:  Negative for shortness of breath.    Cardiovascular:  Negative for chest pain.   Gastrointestinal:  Negative for abdominal pain, nausea and vomiting.   Neurological:  Negative for weakness.   Psychiatric/Behavioral:  Negative for agitation and confusion.    All other systems reviewed and are negative.  Objective:     Vital Signs (Most Recent):  Temp: 97.9 °F (36.6 °C) (07/17/23 0803)  Pulse: 91 (07/17/23 0835)  Resp: 18 (07/17/23 0835)  BP: (!) 188/87 (07/17/23 0803)  SpO2: (!) 93 % (07/17/23 0835) Vital Signs (24h Range):  Temp:  [97.9 °F (36.6 °C)] 97.9 °F (36.6 °C)  Pulse:  [77-91] 91  Resp:  [18-20] 18  SpO2:  [93 %-96 %] 93 %  BP: (171-188)/(79-87) 188/87     Weight: 75.8 kg (167 lb 1.6 oz)  Body mass index is 27.81 kg/m².    Intake/Output Summary (Last 24 hours) at 7/17/2023 1220  Last data filed at 7/17/2023 0748  Gross per 24 hour   Intake 1323 ml   Output --   Net 1323 ml         Physical Exam  Vitals reviewed.   Constitutional:       General: She is not in acute distress.     Appearance: Normal appearance.   HENT:      Head: Normocephalic and  atraumatic.   Eyes:      General: No scleral icterus.     Extraocular Movements: Extraocular movements intact.      Conjunctiva/sclera: Conjunctivae normal.      Pupils: Pupils are equal, round, and reactive to light.   Cardiovascular:      Rate and Rhythm: Normal rate and regular rhythm.      Heart sounds: No murmur heard.    No friction rub. No gallop.   Pulmonary:      Effort: Pulmonary effort is normal. No respiratory distress.      Breath sounds: Normal breath sounds. No wheezing or rales.   Abdominal:      General: Abdomen is flat. Bowel sounds are normal. There is no distension.      Palpations: Abdomen is soft.      Tenderness: There is no abdominal tenderness. There is no guarding.   Musculoskeletal:         General: No swelling.   Skin:     General: Skin is warm and dry.      Coloration: Skin is not jaundiced.      Findings: No rash.   Neurological:      General: No focal deficit present.      Mental Status: She is alert and oriented to person, place, and time.      Sensory: No sensory deficit.      Motor: Weakness present.   Psychiatric:         Mood and Affect: Mood normal.         Thought Content: Thought content normal.           Significant Labs: All pertinent labs within the past 24 hours have been reviewed.  Recent Lab Results       None            Significant Imaging: I have reviewed all pertinent imaging results/findings within the past 24 hours.      Assessment/Plan:      * Frailty syndrome in geriatric patient  PT/OT for safety       Hip dislocation, right, initial encounter  PT and OT for physical strengthening        Current mild episode of major depressive disorder without prior episode  Patient has persistent depression which is mild and is currently controlled. Will Continue anti-depressant medications. We will not consult psychiatry at this time. Patient does not display psychosis at this time. Continue to monitor closely and adjust plan of care as needed.        Chronic low back pain  without sciatica  Prn meds      HTN (hypertension)  Check home meds and resume. Numbers up at times.  May be situational.         VTE Risk Mitigation (From admission, onward)         Ordered     IP VTE HIGH RISK PATIENT  Once         07/13/23 1547     Place sequential compression device  Until discontinued         07/13/23 1547                Discharge Planning   FLACO:      Code Status: DNR   Is the patient medically ready for discharge?:     Reason for patient still in hospital (select all that apply): PT / OT recommendations  Discharge Plan A: Home with family                  Won Plasencia DO  Department of Hospital Medicine   [facility]

## 2023-07-17 NOTE — PROGRESS NOTES
Clinical Pharmacy Chart Review Note      Admit Date: 7/13/2023   LOS: 4 days       Tosin Cortez is a 89 y.o. 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 02/01/2023    Major neurocognitive disorder, due to vascular disease, without behavioral disturbance, mild 02/01/2023    Pulmonary air trapping 06/16/2022    Paresthesia of right upper and lower extremity 05/30/2022    Right arm weakness 05/30/2022    Weight loss 10/25/2021    Chronic low back pain without sciatica 10/25/2021    Vitamin D deficiency 09/20/2021    Weakness 08/09/2021    HTN (hypertension) 06/14/2021    Other insomnia 06/14/2021    Hyperlipemia 06/14/2021    Dysuria 06/14/2021    Osteoarthritis 06/14/2021       Scheduled Meds:    aspirin  81 mg Oral Daily    atorvastatin  80 mg Oral Daily    cephALEXin  500 mg Oral Q12H    clopidogreL  75 mg Oral Daily    colesevelam  625 mg Oral BID WM    [START ON 7/18/2023] losartan  100 mg Oral Daily    metoprolol tartrate  50 mg Oral BID    traZODone  100 mg Oral QHS     Continuous Infusions:   PRN Meds: acetaminophen, calcium carbonate, melatonin, oxyCODONE-acetaminophen,  senna-docusate 8.6-50 mg    OBJECTIVE:     Vital Signs (Last 24H)  Temp:  [97.9 °F (36.6 °C)]   Pulse:  [77-91]   Resp:  [18-20]   BP: (171-188)/(79-87)   SpO2:  [93 %-96 %]     Laboratory:  CBC:   Recent Labs   Lab 07/14/23  0527   WBC 5.50   RBC 4.33   HGB 14.3   HCT 42.9   *   MCV 99.1*   MCH 33.0*   MCHC 33.3     BMP:   Recent Labs   Lab 07/14/23 0527   *      K 4.0      CO2 40*   BUN 7   CREATININE 0.63   CALCIUM 8.2*     .    ASSESSMENT/PLAN:     Estimated Creatinine Clearance: 60.4 mL/min (based on SCr of 0.63 mg/dL).Medications reviewed, no dose adjustments needed. Consider Ca++ replacement for this patient.  Awaiting urine culture/sensitivity.  Weekly swing bed medication regimen review complete.  Will continue to monitor.  Mikey Erickson, Pharm. D.  Director of Pharmacy  [facility]  [phone]  [email or web address]

## 2023-07-17 NOTE — HOSPITAL COURSE
7/17 Seems to be doing well, UA was abnormal and started on Keflex.     7/21 Urine culture came back E. Coli, S to Cephalosporins.  Finished course.  She states she is real weak today and feels bad.  No elaboration though.  Just legs weak.     7/28 Doing well, still weak but she does participate with therapy     7/31 Doing better strength wise.  Asking about going home.     8/4 DC home, she has been on O2.  Checked RA sats and were low.  But when she coughed up secretions and took deep breath she came up 94%.  Will send home with wilma.  Son says he has a nebulizer.  She has no hx of COPD but may need a workup at some point.

## 2023-07-17 NOTE — PT/OT/SLP PROGRESS
"Occupational Therapy  Treatment    Tosin Cortez   MRN: [id number]   Admitting Diagnosis: Frailty syndrome in geriatric patient    OT Date of Treatment: 07/17/23   OT Start Time: 1031  OT Stop Time: 1101  OT Total Time (min): 30 min    Billable Minutes:  Therapeutic Exercise 30    OT/PATTI: OT          General Precautions: Standard, fall  Orthopedic Precautions: RLE anterior precautions  Braces: N/A  Respiratory Status: Nasal cannula, flow 2 L/min         Subjective:  Communicated with pt and nursing prior to session.  Pt states, "I think I might have had a stroke at the other hospital because I'm so weak all over."  Family states, "it's because you're not eating well."       Objective:        Functional Mobility:  Bed Mobility: na with OT today       Transfers: na with OT today        Functional Ambulation: na with OT today    Activities of Daily Living:     Feeding adaptive equipment:  none needed     UE adaptive equipment: Reacher     LE adaptive equipment: Reacher, Long Handled Shoe Horn, Dressing Stick, and Sock aid                     Bathing adaptive equipment: long handled sponge    Balance:   Static Sit: FAIR: Maintains without assist, but unable to take any challenges   Dynamic Sit: FAIR+: Maintains balance through MINIMAL excursions of active trunk motion  Static Stand: na with OT today  Dynamic stand: na with OT today    Therapeutic Activities and Exercises:  To improve endurance, strength, OT facilitated pt with:  UBE x 5min with 1 RB's throughout, low level resist  Gripper x 10 reps x 3 sets each hand, level 2 resist  RED PUTTY to simulate kitchen prep task and improve FM and     To improve reach, AROM, FM/ and trunk rotation with core forward leaning engagement:   Towel scrubbing at tabletop with 3 weighted towel in multi directional pushes/pulls  seated Reciprocal pulleys x 5 min in sh flexion and abduction      AM-PAC 6 CLICK ADL   How much help from another person does this patient currently " "need?   1 = Unable, Total/Dependent Assistance  2 = A lot, Maximum/Moderate Assistance  3 = A little, Minimum/Contact Guard/Supervision  4 = None, Modified [place]/Independent          AM-PAC Raw Score CMS "G-Code Modifier Level of Impairment Assistance   6 % Total / Unable   7 - 8 CM 80 - 100% Maximal Assist   9-13 CL 60 - 80% Moderate Assist   14 - 19 CK 40 - 60% Moderate Assist   20 - 22 CJ 20 - 40% Minimal Assist   23 CI 1-20% SBA / CGA   24 CH 0% Independent/ Mod I       Patient left up in chair with call button in reach and family present    ASSESSMENT:  Tosin Cortez is a 89 y.o. female with a medical diagnosis of Frailty syndrome in geriatric patient and presents with complaints of more weakness than "when my hip popped out of place before.".    Rehab identified problem list/impairments:  weakness, impaired endurance, impaired self care skills, impaired functional mobility, gait instability, impaired balance, decreased lower extremity function, orthopedic precautions    Rehab potential is fair.    Activity tolerance: Fair    Discharge recommendations: home, home with home health, home health PT, home health OT   Barriers to discharge: Barriers to Discharge: None    Equipment recommendations: none    GOALS:   Multidisciplinary Problems       Occupational Therapy Goals          Problem: Occupational Therapy    Goal Priority Disciplines Outcome Interventions   Occupational Therapy Goal     OT, PT/OT Ongoing, Progressing    Description: STG: within 2 weeks  Pt will perform grooming with independence at sinkside from seated  Pt will bathe with setup and mod I sinkside  Pt will perform UE dressing with setup and independently from bedside  Pt will perform LE dressing with setup and mod I from bedside  Pt will sit EOB x 30 min with no assistance  Pt will transfer bed/chair/bsc with mod i  Pt will perform standing task x 3 min with no assistance  Pt will tolerate 35 minutes of tx without " fatigue      LT.Restore to max I with self care and mobility.                          Plan:  Patient to be seen 5 x/week to address the above listed problems via self-care/home management, therapeutic activities, therapeutic exercises, wheelchair management/training  Plan of Care expires: 23  Plan of Care reviewed with: patient         2023

## 2023-07-18 NOTE — PLAN OF CARE
Problem: Pain Acute  Goal: Acceptable Pain Control and Functional Ability  Outcome: Ongoing, Progressing  Intervention: Develop Pain Management Plan  Flowsheets (Taken 7/18/2023 1627)  Pain Management Interventions:   care clustered   medication offered   pillow support provided   quiet environment facilitated   relaxation techniques promoted     Problem: Fall Injury Risk  Goal: Absence of Fall and Fall-Related Injury  Outcome: Ongoing, Progressing  Intervention: Identify and Manage Contributors  Flowsheets (Taken 7/18/2023 1627)  Self-Care Promotion: BADL personal objects within reach  Medication Review/Management: medications reviewed  Intervention: Promote Injury-Free Environment  Flowsheets (Taken 7/18/2023 1627)  Safety Promotion/Fall Prevention:   bedside commode chair   bed alarm refused   Fall Risk signage in place   medications reviewed   room near unit station   side rails raised x 2   instructed to call staff for mobility

## 2023-07-18 NOTE — NURSING
Nurses Note -- 4 Eyes      7/17/2023   8:00 PM      Skin assessed during: Q Shift Change      [x] No Altered Skin Integrity Present    []Prevention Measures Documented      [] Yes- Altered Skin Integrity Present or Discovered   [] LDA Added if Not in Epic (Describe Wound)   [x] New Altered Skin Integrity was Present on Admit and Documented in LDA   [] Wound Image Taken    Wound Care Consulted? Yes    Attending Nurse:  Vonda Langley RN     Second RN/Staff Member: Meche Canada RN

## 2023-07-18 NOTE — PLAN OF CARE
[facility] Medical Surgical Unit - Swing Bed   Interdisciplinary Team Meeting    Patient: Tosin Cortez   Today's Date: 7/18/2023   Estimated D/C Date: TBD        Physician: Won Plasencia DO Nurse Practitioner: Aurea Rodriguez NP   Pharmacy: Mikey Erickson PharmD Unit Director: Zita Brown RN   : Liam Barry RN Physical/Occupational Therapy: Sigrid Parnell OT   Speech Therapy: ST Balaji Activity Therapy: Iris Amezcua   Nursing: Zita Brown RN  Respiratory: Steffany Cee, RT Dietary: Shameka Olea RD  Other: Yana Gerard, Shameka Cleveland     Nursing  New Symptoms/Problems: n/a  Last Bowel Movement: 07/17/23  Urine: continent Diarrhea: No   Constipated: No Bowel: continent Langley: No   Isolation: No     O2 Device: Nasal Cannula O2 Flow: 2L  SpO2: 98 %   Nutrition: Regular  Speech/Swallowing: No current speech or swallowing issues  Aspiration Precautions: No  Cognition: WNL    Physical Therapy  Physical Therapy/Gait: ambulatory with RW WC trail ELOS: Plan to DC     Transfers: Minimal Assistance Range of Motion/Restrictions: RLE WBAT     Occupational Therapy  Eating/Grooming: Supervision or Set-up Assistance Toileting: Contact Guard Assistance   Bathing: Minimal Assistance Dressing (Upper Body): Minimal Assistance   Dressing (Lower Body): Maximum Assistance      Activity Therapy  Level of participation: Active participation      Tx Plan/Recommendations reviewed with family and/or patient on (date) 7/18.  Additional family Conference/Training: n/a  D/C Plan/Recommendations:  family interested in long term care at [facility]   FLACO: TBD    Pharmacy  Medication Changes (see MD orders in chart): No  MD/NP: Won Plasencia DO Labs Reviewed: No New Lab Orders: No   Lab Concerns: n/a

## 2023-07-18 NOTE — PROGRESS NOTES
[facility] Psychotherapy Note      Patient Name: Tosin Cortez    Date: 07/07/2023      Group 1     Time:  4742-5939                        Number of patients in attendance: 6     Group 1 Intervention:  Client-Centered Process    Objective:  Behavior/Symptoms Addressed: Alert, Cooperative, and Attentive    Affect: Restricted and Mood Congruent    Mood: Depressed and Anxious    Patient's response to treatment: Good interaction, Limited Insight, Increased self-expression, Able to follow directions, Required prompting, Good eye contact, and No danger concerns noted    Comments: In this session, therapist and client discussed client's current issues, explored resolutions and possible outcomes. The purpose of this session was to increase client's openness to their experiences and enhance their understanding of themselves.    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 Leahy will identify and report to staff any signs or symptoms of depressed mood that she is experiencing by target date.  2-A: Patient will report sleeping at least four to six consecutive hours for three out of seven nights each week by target date.    Progress toward goals: Limited progress     Ms. Leahy responded to the intervention with a moderate level of engagement by contributing to group discussion and sharing personal input about her recent experiences. Client shared , This past week I've seemed to have been sleeping better. Upon further inquiry, Client reported acquiring about four hours of sleep the night prior. Therapist will continue to monitor for changes in mood or behaviors and encourage Ms. Leahy's expression of self.       Group 2    Time: 1813-7663    Number of patients in attendance: 6    Group 2 Intervention: Validation  Therapy    Objective:  Behavior/Symptoms Addressed: Alert, Cooperative, and Attentive    Affect: Restricted and Mood Congruent    Mood: Depressed and Anxious    Patient's response to treatment: Good interaction, Limited Insight, Increased self-expression, Able to follow directions, Responded without prompting, and No danger concerns noted    Comments: In this session, therapist and clients examined a handout titled, Facts and Opinions, and practiced the tool for challenging unhealthy or irrational beliefs. The purpose of this session was to assist clients with detecting faulty, negative self-talk that could contribute to depressive and anxious symptoms. Therapist taught client that facts are verifiable statements, supported by evidence that are agreed upon, whereas opinions are personal interpretations of facts, which differ from person to person. Therapist also highlighted that when unhealthy opinions are treated as fact, they can exacerbate anxious or depressive symptoms.     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 Leahy will identify and report to staff any signs or symptoms of depressed mood that she is experiencing by target date.  1-B: Patient will attempt 2-3 coping skills to help improve depressive symptoms by target date.    Progress toward goals: No progress    Ms. Leahy's participation in the session was moderate as she appeared engaged in session content as evidenced by eye contact, body language, and contribution towards topic. Using example scenarios, Ms. Leahy practiced examining evidence as a tool for reframing unhealthy beliefs/sentiments and participated in scenarios to practice properly identifying the facts within a situation. Therapist will continue to monitor for changes in mood or behaviors and encourage Ms. Leahy's  expression of self.      Group 3    Time: 4081-5438      Number of patients in attendance: 6    Group 3 Intervention: Behavior Therapy    Objective:  Behavior/Symptoms Addressed: Alert, Cooperative, and Attentive    Affect: Restricted and Mood Congruent    Mood: Depressed and Anxious    Patient's response to treatment: Minimal interaction, Limited Insight, Quiet, Able to follow directions, Required prompting, and No danger concerns noted    Comments: In this session, therapist facilitated a group discussion and covered a worksheet titled, Habit Plan, to provide instructions, examples, and a template for clients to create their own plan for developing new habits. The purpose of this handout was to teach clients a formula behind developing a habit plan to increase the successful accomplishment of goals.    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 Leahy will identify and report to staff any signs or symptoms of depressed mood that she is experiencing by target date.  1-B: Patient will attempt 2-3 coping skills to help improve depressive symptoms by target date.    Progress toward goals: No progress    Ms. Leahy responded with a low level of participation. Client expressed a decrease in expression and did not initiate dialogue, nor did her responses suggest interest in group content. Therapist will continue to monitor client's mood and behaviors for changes and encourage client's expression of self.    KACY GilliamW

## 2023-07-18 NOTE — PT/OT/SLP PROGRESS
"Occupational Therapy   Treatment    Name: Tosin Cortez  MRN: [id number]  Admitting Diagnosis:  Frailty syndrome in geriatric patient       Recommendations:     Discharge Recommendations: home, home with home health, home health PT, home health OT  Discharge Equipment Recommendations:  none  Barriers to discharge:  None    Assessment:     Tosin Cortez is a 89 y.o. female with a medical diagnosis of Frailty syndrome in geriatric patient.  She presents with feeling a bit better than yesterday; she now has been started on Keflex due to findings of new UTI. Performance deficits affecting function are weakness, impaired endurance, impaired self care skills, impaired functional mobility, gait instability, impaired balance, decreased lower extremity function, orthopedic precautions.     Rehab Prognosis:  Good; patient would benefit from acute skilled OT services to address these deficits and reach maximum level of function.       Plan:     Patient to be seen 5 x/week to address the above listed problems via self-care/home management, therapeutic activities, therapeutic exercises, wheelchair management/training  Plan of Care Expires: 08/04/23  Plan of Care Reviewed with: patient    Subjective     Chief Complaint: weakness, generalized feelings of being "washed out"  Patient/Family Comments/goals:  return home with son at mod I level  Pain/Comfort:   Feels better    Objective:     Communicated with: pt prior to session.  Patient found up in chair with  (no lines or drains) upon OT entry to room.    General Precautions: Standard, fall    Orthopedic Precautions:RLE anterior precautions  Braces: N/A  Respiratory Status: Nasal cannula, flow 2 L/min     Occupational Performance:     Bed Mobility:    Na with OT today    Functional Mobility/Transfers:  Na with OT Today    Activities of Daily Living:  Na with OT today      [facility] 6 Click ADL:      Treatment & Education:  To improve endurance, strength, OT facilitated pt with:  GIN bravo " 10 min with 1 RB's throughout, low level resist  Green PUTTY to simulate kitchen prep task and improve FM and , small object search      Patient left up in chair with  LPTA present    GOALS:   Multidisciplinary Problems       Occupational Therapy Goals          Problem: Occupational Therapy    Goal Priority Disciplines Outcome Interventions   Occupational Therapy Goal     OT, PT/OT Ongoing, Progressing    Description: STG: within 2 weeks  Pt will perform grooming with independence at sinkside from seated  Pt will bathe with setup and mod I sinkside  Pt will perform UE dressing with setup and independently from bedside  Pt will perform LE dressing with setup and mod I from bedside  Pt will sit EOB x 30 min with no assistance  Pt will transfer bed/chair/bsc with mod i  Pt will perform standing task x 3 min with no assistance  Pt will tolerate 35 minutes of tx without fatigue      LT.Restore to max I with self care and mobility.                          Time Tracking:     OT Date of Treatment: 23  OT Start Time: 1036  OT Stop Time: 1103  OT Total Time (min): 27 min    Billable Minutes:Therapeutic Activity 19  Therapeutic Exercise 8    OT/PATTI: OT          2023

## 2023-07-18 NOTE — PT/OT/SLP PROGRESS
Physical Therapy Treatment    Patient Name:  Tosin Cortez   MRN:  [id number]    Recommendations:     Discharge Recommendations: home health PT  Discharge Equipment Recommendations: none  Barriers to discharge: Inaccessible home and Decreased caregiver support    Assessment:     Tosin Cortez is a 89 y.o. female admitted with a medical diagnosis of Frailty syndrome in geriatric patient.  She presents with the following impairments/functional limitations: weakness, impaired endurance, gait instability, impaired balance, impaired functional mobility, decreased lower extremity function, orthopedic precautions. PTA provided patient with visual and verbal cues for proper form and hold time of therapeutic exercises, and for proper RW management during gait training. Patient O2 sats were 98-99% on 2L of O2 prior to gait training. Patient required one seated rest break in between gait trials due to fatigue. Patient O2 sats were monitored during gait training on 2L of O2 and were 97% during seated rest break, and 92% after completion of gait training but they quickly increased to 96%.    Rehab Prognosis: Good; patient would benefit from acute skilled PT services to address these deficits and reach maximum level of function.    Recent Surgery: * No surgery found *      Plan:     During this hospitalization, patient to be seen 5 x/week to address the identified rehab impairments via gait training, therapeutic activities, therapeutic exercises and progress toward the following goals:    Plan of Care Expires:  08/04/23    Subjective     Chief Complaint: Low back pain  Patient/Family Comments/goals: pt to dc home when more functional.  Pain/Comfort:  Pain Rating 1: 5/10  Location - Side 1: Bilateral  Location - Orientation 1: lower  Location 1: back  Pain Addressed 1: Reposition      Objective:     Communicated with OT prior to treatment. Patient found up in chair with oxygen and was received following OT treatment.     General  "Precautions: Standard, fall, hearing impaired  Orthopedic Precautions: RLE weight bearing as tolerated, RLE anterior precautions  Braces:    Respiratory Status: Nasal cannula, flow 2 L/min     Functional Mobility:  Transfers:     Sit to Stand:  contact guard assistance with rolling walker  Gait: Pt amb'd x 11 ft+21 ft using RW with CGA/Olivia x1, 2L of O2, and wc trail.       AM-PAC 6 CLICK MOBILITY  Turning over in bed (including adjusting bedclothes, sheets and blankets)?: 4  Sitting down on and standing up from a chair with arms (e.g., wheelchair, bedside commode, etc.): 4  Moving from lying on back to sitting on the side of the bed?: 3  Moving to and from a bed to a chair (including a wheelchair)?: 3  Need to walk in hospital room?: 3  Climbing 3-5 steps with a railing?: 1  Basic Mobility Total Score: 18       Treatment & Education:  Hip ADD squeezes 10x2 with 3" hold, LAQs with 1# 2 x 10, seated heel raises with 1# 2 x10, Hip ABD with yellow TB 20x    Patient left up in chair with all lines intact and call button in reach.    GOALS:   Multidisciplinary Problems       Physical Therapy Goals          Problem: Physical Therapy    Goal Priority Disciplines Outcome Goal Variances Interventions   Physical Therapy Goal     PT, PT/OT Ongoing, Progressing     Description: LTG - 3 weeks  1. Pt SBA with supine to/from sit using strap or cane to life RLE onto bed surface.  2. Pt will amb 150 ft with SBA using RW.  3. Pt will be SBA with transfers throughout.                         Time Tracking:     PT Received On: 07/18/23  PT Start Time: 1106     PT Stop Time: 1144  PT Total Time (min): 38 min     Billable Minutes: Gait Training 12 and Therapeutic Exercise 26    Treatment Type: Treatment  PT/PTA: PTA     Number of PTA visits since last PT visit: 1 07/18/2023  "

## 2023-07-18 NOTE — PLAN OF CARE
Problem: Physical Therapy  Goal: Physical Therapy Goal  Description: LTG - 3 weeks  1. Pt SBA with supine to/from sit using strap or cane to life RLE onto bed surface.-PROGRESSING  2. Pt will amb 150 ft with SBA using RW.-PROGRESSING  3. Pt will be SBA with transfers throughout.-PROGRESSING    Outcome: Ongoing, Progressing

## 2023-07-18 NOTE — PLAN OF CARE
Problem: Adult Inpatient Plan of Care  Goal: Readiness for Transition of Care  Outcome: Ongoing, Progressing   Patient will be more confident with transfers from bed to ambulating by 7/30/23.

## 2023-07-19 NOTE — PT/OT/SLP PROGRESS
"Occupational Therapy   Treatment    Name: Tosin Cortez  MRN: [id number]  Admitting Diagnosis:  Frailty syndrome in geriatric patient       Recommendations:     Discharge Recommendations: home, home with home health, home health PT, home health OT  Discharge Equipment Recommendations:  none  Barriers to discharge:  None    Assessment:     Tosin Cortez is a 89 y.o. female with a medical diagnosis of Frailty syndrome in geriatric patient.  She presents with no new px's, stating she feels better today. Performance deficits affecting function are weakness, impaired endurance, impaired self care skills, impaired functional mobility, gait instability, impaired balance, decreased lower extremity function, orthopedic precautions.     Rehab Prognosis:  Fair; patient would benefit from acute skilled OT services to address these deficits and reach maximum level of function.       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  Pain/Comfort:   No significant complaints today    Objective:     Communicated with: pt and CNA prior to session.  Patient found up in chair with  (no lines or drains) upon OT entry to room.    General Precautions: Standard, fall    Orthopedic Precautions:RLE anterior precautions  Braces: N/A  Respiratory Status: Nasal cannula, flow 2 L/min     Occupational Performance:     Activities of Daily Living:  Grooming: supervision sinkside grooming with svn mainly for cueing and setup      [facility] 6 Click ADL:      Treatment & Education:  To improve endurance, strength, OT facilitated pt " with:  UBE x 10 min with 2 RB's throughout, low level resist  GREEN PUTTY to simulate kitchen prep task and improve FM and       To improve reach, AROM, FM/ and trunk rotation with core forward leaning engagement:   seated Reciprocal pulleys x 6 min in sh flexion and abduction      Patient left up in chair with call button in reach    GOALS:   Multidisciplinary Problems       Occupational Therapy Goals          Problem: Occupational Therapy    Goal Priority Disciplines Outcome Interventions   Occupational Therapy Goal     OT, PT/OT Ongoing, Progressing    Description: STG: within 2 weeks  Pt will perform grooming with independence at sinkside from seated  Pt will bathe with setup and mod I sinkside  Pt will perform UE dressing with setup and independently from bedside  Pt will perform LE dressing with setup and mod I from bedside  Pt will sit EOB x 30 min with no assistance  Pt will transfer bed/chair/bsc with mod i  Pt will perform standing task x 3 min with no assistance  Pt will tolerate 35 minutes of tx without fatigue      LT.Restore to max I with self care and mobility.                          Time Tracking:     OT Date of Treatment: 23  OT Start Time: 1010  OT Stop Time: 1050  OT Total Time (min): 40 min    Billable Minutes:Self Care/Home Management 8  Therapeutic Activity 20  Therapeutic Exercise 12    OT/PATTI: OT          2023

## 2023-07-19 NOTE — PLAN OF CARE
Problem: Adult Inpatient Plan of Care  Goal: Plan of Care Review  Outcome: Ongoing, Progressing  Goal: Patient-Specific Goal (Individualized)  Outcome: Ongoing, Progressing  Goal: Absence of Hospital-Acquired Illness or Injury  Outcome: Ongoing, Progressing  Goal: Optimal Comfort and Wellbeing  Outcome: Ongoing, Progressing  Goal: Readiness for Transition of Care  Outcome: Ongoing, Progressing     Problem: Cognitive Impairment  Goal: Optimal Cognitive Function  Outcome: Ongoing, Progressing     Problem: Pain Acute  Goal: Acceptable Pain Control and Functional Ability  Outcome: Ongoing, Progressing     Problem: Fall Injury Risk  Goal: Absence of Fall and Fall-Related Injury  Outcome: Ongoing, Progressing     Problem: Skin Injury Risk Increased  Goal: Skin Health and Integrity  Outcome: Ongoing, Progressing     Problem: Breathing Pattern Ineffective  Goal: Effective Breathing Pattern  Outcome: Ongoing, Progressing     Problem: Gas Exchange Impaired  Goal: Optimal Gas Exchange  Outcome: Ongoing, Progressing     Problem: Infection  Goal: Absence of Infection Signs and Symptoms  Outcome: Ongoing, Progressing     Problem: UTI (Urinary Tract Infection)  Goal: Improved Infection Symptoms  Outcome: Ongoing, Progressing

## 2023-07-19 NOTE — PLAN OF CARE
Problem: Adult Inpatient Plan of Care  Goal: Absence of Hospital-Acquired Illness or Injury  Outcome: Ongoing, Progressing   Patient will be able to identify needs and relay to staff ie; go to the bathroom with assistance.

## 2023-07-19 NOTE — NURSING
Nurses Note -- 4 Eyes      7/18/2023   7:43 PM      Skin assessed during: Q Shift Change      [x] No Altered Skin Integrity Present    []Prevention Measures Documented      [] Yes- Altered Skin Integrity Present or Discovered   [] LDA Added if Not in Epic (Describe Wound)   [] New Altered Skin Integrity was Present on Admit and Documented in LDA   [] Wound Image Taken    Wound Care Consulted? No    Attending Nurse:  Vonda Langley RN     Second RN/Staff Member: Sera Bowman RN

## 2023-07-20 NOTE — PLAN OF CARE
Problem: Infection  Goal: Absence of Infection Signs and Symptoms  Outcome: Ongoing, Progressing     Problem: UTI (Urinary Tract Infection)  Goal: Improved Infection Symptoms  Outcome: Ongoing, Progressing

## 2023-07-20 NOTE — PT/OT/SLP PROGRESS
Occupational Therapy   Treatment    Name: Tosin Cortez  MRN: [id number]  Admitting Diagnosis:  Frailty syndrome in geriatric patient       Recommendations:     Discharge Recommendations: home, home with home health, home health PT, home health OT  Discharge Equipment Recommendations:  none  Barriers to discharge:  None    Assessment:     Tosin Cortez is a 89 y.o. female with a medical diagnosis of Frailty syndrome in geriatric patient.  She presents with feeling weak today. Performance deficits affecting function are weakness, impaired endurance, impaired self care skills, impaired functional mobility, gait instability, impaired balance, decreased lower extremity function, orthopedic precautions.     Rehab Prognosis:  Fair; patient would benefit from acute skilled OT services to address these deficits and reach maximum level of function.       Plan:     Patient to be seen 5 x/week to address the above listed problems via self-care/home management, therapeutic activities, therapeutic exercises, wheelchair management/training  Plan of Care Expires: 08/04/23  Plan of Care Reviewed with: patient    Subjective     Chief Complaint: I feel weak this morning  Patient/Family Comments/goals: family is seeking nursing home placement for pt at this time  Pain/Comfort:   No reports of pain today to OT    Objective:     Communicated with: pt and CNA prior to session.  Patient found up in chair with  (no lines or drains) upon OT entry to room.    General Precautions: Standard, fall    Orthopedic Precautions:RLE anterior precautions  Braces: N/A  Respiratory Status: Nasal cannula, flow 2 L/min (without oxygen supplementation pt demonstrates low 80%s saturation; with supplementation, saturation is >97%)     Occupational Performance:     Activities of Daily Living:  Receiving assist from PCT at min assist level daily at this time      [facility] 6 Click ADL:      Treatment & Education:  To improve endurance, strength, OT facilitated pt  "with:  5 minutes with UBE seated, low resist    To improve reach, AROM, FM/ and trunk rotation with core forward leaning engagement:   OT initiated pt with vertical card board matching activity, nonweighted, pt able to complete at slow pace using B arms since RUE unable to reach above 90 degrees of sh flexion from old onset chronic shoulder problems; pt unable to complete very quickly so stopped after about 1/3 of task completed.  Pt also had oxygen removed from her nose, and OT suspects she slowed down with mental ability with sats dropping some.  OT replaced on pt nose correctly at this time and transitioned pt to next exercise.    seated Reciprocal pulleys x 4 min in sh flexion and abduction; during this exercise, pt complained of her "neck velia hurting."  OT added shoulder shrugs and scap retracts to exercise today to work on this problem for pt, 15 reps each x 2 sets.    At end of session, OT took pt back to her room where her son was sitting waiting on her to come back.  OT educated him regarding pt need of oxygen saturation per her sats as noted above.  OT encouraged spirometer use for improving lung capacity and deep breathing.  OT also noted to son that pt complained of neck pain, so OT educated him regarding shoulder shrugs, scap retracts with demonstration and instruction to reduce neck pain and improve posture and neck positioning.    Pt son thanked OT as OT left pt to sit with him up in w/c.          Patient left up in chair with call button in reach    GOALS:   Multidisciplinary Problems       Occupational Therapy Goals          Problem: Occupational Therapy    Goal Priority Disciplines Outcome Interventions   Occupational Therapy Goal     OT, PT/OT Ongoing, Progressing    Description: STG: within 2 weeks  Pt will perform grooming with independence at sinkside from seated  Pt will bathe with setup and mod I sinkside  Pt will perform UE dressing with setup and independently from bedside  Pt will " perform LE dressing with setup and mod I from bedside  Pt will sit EOB x 30 min with no assistance  Pt will transfer bed/chair/bsc with mod i  Pt will perform standing task x 3 min with no assistance  Pt will tolerate 35 minutes of tx without fatigue      LT.Restore to max I with self care and mobility.                          Time Tracking:     OT Date of Treatment: 23  OT Start Time: 45  OT Stop Time: 1030  OT Total Time (min): 45 min    Billable Minutes:Therapeutic Activity 20  Therapeutic Exercise 25    OT/PATTI: OT          2023

## 2023-07-20 NOTE — PLAN OF CARE
Joaquina from [facility] called, states that secondary insurance will not pay for long term care. Joaquina states that she will call patient's son and explain this.

## 2023-07-20 NOTE — PT/OT/SLP PROGRESS
Physical Therapy Treatment    Patient Name:  Tosin Cortez   MRN:  [id number]    Recommendations:     Discharge Recommendations: intermediate care facility/nursing home  Discharge Equipment Recommendations: none  Barriers to discharge: Inaccessible home    Assessment:     Tosin Cortez is a 89 y.o. female admitted with a medical diagnosis of Frailty syndrome in geriatric patient.  She presents with the following impairments/functional limitations: weakness, impaired endurance, impaired functional mobility, gait instability, impaired balance, impaired cognition, decreased coordination, decreased lower extremity function. Patient's O2 sats were 97-98% on 2L of O2 prior to treatment, and % prior to gait training. Patient with no complaints of increased pain or adverse effects during or after treatment.     Rehab Prognosis: Fair; patient would benefit from acute skilled PT services to address these deficits and reach maximum level of function.    Recent Surgery: * No surgery found *      Plan:     During this hospitalization, patient to be seen 5 x/week to address the identified rehab impairments via gait training, therapeutic activities, therapeutic exercises and progress toward the following goals:    Plan of Care Expires:  08/04/23    Subjective     Chief Complaint: back pain  Patient/Family Comments/goals: Pt to dc to ns home.  Pain/Comfort:  Pain Rating 1: 3/10  Location - Side 1: Bilateral  Location - Orientation 1: generalized  Location 1: back  Pain Addressed 1: Pre-medicate for activity      Objective:     Communicated with pt prior to session. Patient found up in chair with oxygen upon PT entry to room.     General Precautions: Standard, fall, hearing impaired  Orthopedic Precautions: RLE weight bearing as tolerated, RLE anterior precautions  Braces:    Respiratory Status: Nasal cannula, flow 2 L/min     Functional Mobility:  Transfers:     Sit to Stand:  min A x 1 with rolling walker  Gait: Pt amb'd with  RW x 24 ft with min Ax1.      AM-PAC 6 CLICK MOBILITY  Turning over in bed (including adjusting bedclothes, sheets and blankets)?: 4  Sitting down on and standing up from a chair with arms (e.g., wheelchair, bedside commode, etc.): 4  Moving from lying on back to sitting on the side of the bed?: 3  Moving to and from a bed to a chair (including a wheelchair)?: 3  Need to walk in hospital room?: 3  Climbing 3-5 steps with a railing?: 1  Basic Mobility Total Score: 18       Treatment & Education:  Seated BLE ther ex 2x10 reps each: LAQ 1.5#, DF, PF 1.5#, resisted hip add using blue ball, hip ABD with red TB    Patient left up in chair with call button in reach and O2 in place .    GOALS:   Multidisciplinary Problems       Physical Therapy Goals          Problem: Physical Therapy    Goal Priority Disciplines Outcome Goal Variances Interventions   Physical Therapy Goal     PT, PT/OT Ongoing, Progressing     Description: LTG - 3 weeks  1. Pt SBA with supine to/from sit using strap or cane to life RLE onto bed surface.-PROGRESSING  2. Pt will amb 150 ft with SBA using RW.-PROGRESSING  3. Pt will be SBA with transfers throughout.-PROGRESSING                         Time Tracking:     PT Received On: 07/20/23  PT Start Time: 1040     PT Stop Time: 1111  PT Total Time (min): 31 min     Billable Minutes: Gait Training 8 and Therapeutic Exercise 23    Treatment Type: Treatment  PT/PTA: PTA     Number of PTA visits since last PT visit: 1 07/20/2023

## 2023-07-21 NOTE — PROGRESS NOTES
[facility]  Hospital Medicine  Progress Note    Patient Name: Tosin Cortez  MRN: [id number]  Patient Class: IP- Swing   Admission Date: 7/13/2023  Length of Stay: 8 days  Attending Physician: Won Plasencia DO  Primary Care Provider: Zechariah Camacho MD        Subjective:     Principal Problem:Frailty syndrome in geriatric patient        HPI:  Mrs. Tosin Cortez is a pleasant 89 y/o  female who has admitted to [facility] for skilled nursing services, PT/OT s/p surgical reduction of a right hip dislocation on 7/11/2023 by Dr. Pena of [facility]. She is doing well this am, No major pain.  Tolerating diet.       Overview/Hospital Course:  7/17 Seems to be doing well, UA was abnormal and started on Keflex.     7/21 Urine culture came back E. Coli, S to Cephalosporins.  Finished course.  She states she is real weak today and feels bad.  No elaboration though.  Just legs weak.       Interval History: weak today, will monitor     Review of Systems   Respiratory:  Negative for shortness of breath.    Cardiovascular:  Negative for chest pain.   Gastrointestinal:  Negative for abdominal pain, nausea and vomiting.   Neurological:  Positive for weakness.   Psychiatric/Behavioral:  Negative for agitation and confusion.    All other systems reviewed and are negative.  Objective:     Vital Signs (Most Recent):  Temp: 97.4 °F (36.3 °C) (07/21/23 0827)  Pulse: 76 (07/21/23 0842)  Resp: 18 (07/21/23 0842)  BP: (!) 157/76 (07/21/23 0827)  SpO2: 95 % (07/21/23 0842) Vital Signs (24h Range):  Temp:  [97.4 °F (36.3 °C)-98.1 °F (36.7 °C)] 97.4 °F (36.3 °C)  Pulse:  [] 76  Resp:  [18-20] 18  SpO2:  [95 %-98 %] 95 %  BP: (125-157)/(68-76) 157/76     Weight: 76 kg (167 lb 8 oz)  Body mass index is 27.87 kg/m².    Intake/Output Summary (Last 24 hours) at 7/21/2023 1323  Last data filed at 7/21/2023 1145  Gross per 24 hour   Intake 1617 ml   Output --   Net 1617 ml          Physical Exam  Vitals reviewed.   Constitutional:       General: She is not in acute distress.     Appearance: Normal appearance.   HENT:      Head: Normocephalic and atraumatic.   Eyes:      General: No scleral icterus.     Extraocular Movements: Extraocular movements intact.      Conjunctiva/sclera: Conjunctivae normal.      Pupils: Pupils are equal, round, and reactive to light.   Cardiovascular:      Rate and Rhythm: Normal rate and regular rhythm.      Heart sounds: No murmur heard.    No friction rub. No gallop.   Pulmonary:      Effort: Pulmonary effort is normal. No respiratory distress.      Breath sounds: Normal breath sounds. No wheezing or rales.   Abdominal:      General: Abdomen is flat. Bowel sounds are normal. There is no distension.      Palpations: Abdomen is soft.      Tenderness: There is no abdominal tenderness. There is no guarding.   Musculoskeletal:         General: No swelling.   Skin:     General: Skin is warm and dry.      Coloration: Skin is not jaundiced.      Findings: No rash.   Neurological:      General: No focal deficit present.      Mental Status: She is alert and oriented to person, place, and time.      Sensory: No sensory deficit.      Motor: Weakness present.   Psychiatric:         Mood and Affect: Mood normal.         Thought Content: Thought content normal.           Significant Labs: All pertinent labs within the past 24 hours have been reviewed.  Recent Lab Results         07/21/23  0538        Anion Gap 3       Baso # 0.01       Basophil % 0.2       BUN 15       BUN/CREAT RATIO 25       Calcium 8.7       Chloride 98       CO2 42       Creatinine 0.59       Differential Type Auto       eGFR 86       Eos # 0.00       Eosinophil % 0.0       Glucose 105       Hematocrit 40.8       Hemoglobin 13.3       Lymph # 1.77       Lymph % 31.7       MCH 33.3       MCHC 32.6       .0       Mono # 0.97       Mono % 17.4       MPV 10.3       Neutrophils, Abs 2.84       Neutrophils  Relative 50.7       Platelets 117       Potassium 4.2       RBC 4.00       RDW 12.9       Sodium 139       WBC 5.59               Significant Imaging: I have reviewed all pertinent imaging results/findings within the past 24 hours.      Assessment/Plan:      * Frailty syndrome in geriatric patient  PT/OT for safety       Hip dislocation, right, initial encounter  PT and OT for physical strengthening        Current mild episode of major depressive disorder without prior episode  Patient has persistent depression which is mild and is currently controlled. Will Continue anti-depressant medications. We will not consult psychiatry at this time. Patient does not display psychosis at this time. Continue to monitor closely and adjust plan of care as needed.        Chronic low back pain without sciatica  Prn meds      HTN (hypertension)  Check home meds and resume. Numbers up at times.  May be situational.         VTE Risk Mitigation (From admission, onward)         Ordered     IP VTE HIGH RISK PATIENT  Once         07/13/23 1547     Place sequential compression device  Until discontinued         07/13/23 1547                Discharge Planning   FLACO:      Code Status: DNR   Is the patient medically ready for discharge?:     Reason for patient still in hospital (select all that apply): PT / OT recommendations  Discharge Plan A: Home with family                  Won Plasencia DO  Department of Hospital Medicine   [facility]

## 2023-07-21 NOTE — SUBJECTIVE & OBJECTIVE
Interval History: weak today, will monitor     Review of Systems   Respiratory:  Negative for shortness of breath.    Cardiovascular:  Negative for chest pain.   Gastrointestinal:  Negative for abdominal pain, nausea and vomiting.   Neurological:  Positive for weakness.   Psychiatric/Behavioral:  Negative for agitation and confusion.    All other systems reviewed and are negative.  Objective:     Vital Signs (Most Recent):  Temp: 97.4 °F (36.3 °C) (07/21/23 0827)  Pulse: 76 (07/21/23 0842)  Resp: 18 (07/21/23 0842)  BP: (!) 157/76 (07/21/23 0827)  SpO2: 95 % (07/21/23 0842) Vital Signs (24h Range):  Temp:  [97.4 °F (36.3 °C)-98.1 °F (36.7 °C)] 97.4 °F (36.3 °C)  Pulse:  [] 76  Resp:  [18-20] 18  SpO2:  [95 %-98 %] 95 %  BP: (125-157)/(68-76) 157/76     Weight: 76 kg (167 lb 8 oz)  Body mass index is 27.87 kg/m².    Intake/Output Summary (Last 24 hours) at 7/21/2023 1323  Last data filed at 7/21/2023 1145  Gross per 24 hour   Intake 1617 ml   Output --   Net 1617 ml         Physical Exam  Vitals reviewed.   Constitutional:       General: She is not in acute distress.     Appearance: Normal appearance.   HENT:      Head: Normocephalic and atraumatic.   Eyes:      General: No scleral icterus.     Extraocular Movements: Extraocular movements intact.      Conjunctiva/sclera: Conjunctivae normal.      Pupils: Pupils are equal, round, and reactive to light.   Cardiovascular:      Rate and Rhythm: Normal rate and regular rhythm.      Heart sounds: No murmur heard.    No friction rub. No gallop.   Pulmonary:      Effort: Pulmonary effort is normal. No respiratory distress.      Breath sounds: Normal breath sounds. No wheezing or rales.   Abdominal:      General: Abdomen is flat. Bowel sounds are normal. There is no distension.      Palpations: Abdomen is soft.      Tenderness: There is no abdominal tenderness. There is no guarding.   Musculoskeletal:         General: No swelling.   Skin:     General: Skin is warm and  dry.      Coloration: Skin is not jaundiced.      Findings: No rash.   Neurological:      General: No focal deficit present.      Mental Status: She is alert and oriented to person, place, and time.      Sensory: No sensory deficit.      Motor: Weakness present.   Psychiatric:         Mood and Affect: Mood normal.         Thought Content: Thought content normal.           Significant Labs: All pertinent labs within the past 24 hours have been reviewed.  Recent Lab Results         07/21/23  0538        Anion Gap 3       Baso # 0.01       Basophil % 0.2       BUN 15       BUN/CREAT RATIO 25       Calcium 8.7       Chloride 98       CO2 42       Creatinine 0.59       Differential Type Auto       eGFR 86       Eos # 0.00       Eosinophil % 0.0       Glucose 105       Hematocrit 40.8       Hemoglobin 13.3       Lymph # 1.77       Lymph % 31.7       MCH 33.3       MCHC 32.6       .0       Mono # 0.97       Mono % 17.4       MPV 10.3       Neutrophils, Abs 2.84       Neutrophils Relative 50.7       Platelets 117       Potassium 4.2       RBC 4.00       RDW 12.9       Sodium 139       WBC 5.59               Significant Imaging: I have reviewed all pertinent imaging results/findings within the past 24 hours.

## 2023-07-21 NOTE — PROGRESS NOTES
"[facility]    Clinical Nutrition Follow-Up         Date: 7/21/2023 12:49 PM    Consult received 7/13 re: Assess/educate regarding nutritional needs    Tosin Cortez is a 89 y.o. 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       PMH:  has a past medical history of Current mild episode of major depressive disorder without prior episode (2/1/2023), Herpes zoster without complication (7/12/2021), Hypertension, Major neurocognitive disorder, due to vascular disease, without behavioral disturbance, mild (2/1/2023), and Stroke.    Nutrition/Diet History  Spiritual, Cultural Beliefs, Zoroastrianism Practices, Values that Affect Care: no    Diet Order: Diet Adult Regular (IDDSI Level 7)  Oral Supplements: none       Anthropometrics:   Height: 5' 5" (165.1 cm)  Weight: 75.8 kg (167 lb 1.6 oz)  IBW: 125#   BMI (Calculated): 27.8  BMI Classification: WNL (per range for geriatric age)     7/20/23: 76 kg       7/14/23: eating 50-75% meals. Adequate.  7/21/23: weight stable. Po intake variable. Drinking Ensure.     Labs:   Recent Labs   Lab 07/21/23  0538      K 4.2   BUN 15   CREATININE 0.59      CALCIUM 8.7   CL 98       Last A1c:   Lab Results   Component Value Date    HGBA1C 4.8 05/30/2022     Lab Results   Component Value Date    RBC 4.00 (L) 07/21/2023    HGB 13.3 07/21/2023    HCT 40.8 07/21/2023    .0 (H) 07/21/2023    MCH 33.3 (H) 07/21/2023    MCHC 32.6 07/21/2023       Meds:   Scheduled Meds:   aspirin  81 mg Oral Daily    atorvastatin "  80 mg Oral Daily    clopidogreL  75 mg Oral Daily    colesevelam  625 mg Oral BID WM    losartan  100 mg Oral Daily    metoprolol tartrate  50 mg Oral BID    traZODone  100 mg Oral QHS    zinc oxide   Topical (Top) Daily     Continuous Infusions: none     PRN Meds:.acetaminophen, calcium carbonate, melatonin, oxyCODONE-acetaminophen, senna-docusate 8.6-50 mg    Physical Review:  General: 2L NC, Glasses, alert, disoriented to time and situation   Abd/GI: WDL   Last Bowel Movement: 23  Ext:  +1 trace edema   Skin: no skin breakdown noted     Vinh Score:   Vinh Risk Assessment  Sensory Perception: 4-->no impairment  Moisture: 3-->occasionally moist  Activity: 3-->walks occasionally  Mobility: 3-->slightly limited  Nutrition: 3-->adequate  Friction and Shear: 3-->no apparent problem  Vinh Score: 19    Malnutrition Screening Tool  Have you recently lost weight without trying?: No  Weight loss score: 0  Have you been eating poorly because of a decreased appetite?: No  Appetite score: 0  MST Score: 0    Estimated Nutrition Needs:   20-25 kcal/[id number] kcal/day  1-1.2 g pro/k-91 g protein/day   7046-3596 ml fluid/day    Nutrition Diagnosis:  No pertinent nutrition dx at this time     Recommendations/ Intervention:  Continue current diet as tolerated    Weekly weights    RD to monitor po intake,wt,nutrition-related labs, and meds      Nutrition Risk: moderate    Signed  Shameka Olea, MS, RD, LD  Available via [employer]anc[employer]

## 2023-07-21 NOTE — PT/OT/SLP PROGRESS
"Occupational Therapy   Treatment    Name: Tosin Cortez  MRN: [id number]  Admitting Diagnosis:  Frailty syndrome in geriatric patient       Recommendations:     Discharge Recommendations: home, home with home health, home health PT, home health OT  Discharge Equipment Recommendations:  none  Barriers to discharge:  None    Assessment:     Tosin Cortez is a 89 y.o. female with a medical diagnosis of Frailty syndrome in geriatric patient.  She presents with no new problems but does state, "I think I'm getting worse." Performance deficits affecting function are weakness, impaired endurance, impaired self care skills, impaired functional mobility, gait instability, impaired balance, decreased lower extremity function, orthopedic precautions.     Rehab Prognosis:  Fair; patient would benefit from acute skilled OT services to address these deficits and reach maximum level of function.       Plan:     Patient to be seen 5 x/week to address the above listed problems via self-care/home management, therapeutic activities, therapeutic exercises, wheelchair management/training  Plan of Care Expires: 08/04/23  Plan of Care Reviewed with: patient    Subjective     Chief Complaint: weakness  Patient/Family Comments/goals: wants to go back home with her son; family making decisions about possible LTC  Pain/Comfort:   No pain, just weak mainly    Objective:     Communicated with: pt and LPTA prior to session.  Patient found up in chair with  (no lines or drains) upon OT entry to room.    General Precautions: Standard, fall    Orthopedic Precautions:RLE anterior precautions  Braces: N/A  Respiratory Status: Nasal cannula, flow 2 L/min     Occupational Performance:     Bed Mobility:    Na with OT    Functional Mobility/Transfers:  See PT    Activities of Daily Living:  Handwashing at sinkside for lunch prep, independence after setup      [facility] 6 Click ADL:      Treatment & Education:  To improve endurance, strength, OT facilitated pt " with:  UBE x 12min with no RB's throughout, low level resist  GREEN PUTTY to simulate kitchen prep task and improve FM and     To improve reach, AROM, FM/ and trunk rotation with core forward leaning engagement:   OT initiated pt with vertical card board matching activity, pt eager to perform and participate in this activity today  seated Reciprocal pulleys x 5 min in sh flexion and abduction    Pt able to handwash at end of session with SVN at sinkside.  Pt left to self feed I'ly with lunch on tabletop    Patient left up in chair with call button in reach    GOALS:   Multidisciplinary Problems       Occupational Therapy Goals          Problem: Occupational Therapy    Goal Priority Disciplines Outcome Interventions   Occupational Therapy Goal     OT, PT/OT Ongoing, Progressing    Description: STG: within 2 weeks  Pt will perform grooming with independence at sinkside from seated  Pt will bathe with setup and mod I sinkside  Pt will perform UE dressing with setup and independently from bedside  Pt will perform LE dressing with setup and mod I from bedside  Pt will sit EOB x 30 min with no assistance  Pt will transfer bed/chair/bsc with mod i  Pt will perform standing task x 3 min with no assistance  Pt will tolerate 35 minutes of tx without fatigue      LT.Restore to max I with self care and mobility.                          Time Tracking:     OT Date of Treatment: 23  OT Start Time: 1055  OT Stop Time: 1130  OT Total Time (min): 35 min    Billable Minutes:Therapeutic Activity 15  Therapeutic Exercise 20    OT/PATTI: OT          2023

## 2023-07-22 NOTE — PLAN OF CARE
Problem: Skin Injury Risk Increased  Goal: Skin Health and Integrity  Outcome: Ongoing, Progressing     Problem: Breathing Pattern Ineffective  Goal: Effective Breathing Pattern  Outcome: Ongoing, Progressing     Problem: Gas Exchange Impaired  Goal: Optimal Gas Exchange  Outcome: Ongoing, Progressing

## 2023-07-22 NOTE — PLAN OF CARE
Problem: Breathing Pattern Ineffective  Goal: Effective Breathing Pattern  7/22/2023 1641 by Robert Albrecht, RRT  Outcome: Ongoing, Progressing  7/22/2023 0813 by Robert Albrecht, RRT  Outcome: Ongoing, Progressing     Problem: Gas Exchange Impaired  Goal: Optimal Gas Exchange  7/22/2023 1641 by Robert Albrecht, RRT  Outcome: Ongoing, Progressing  7/22/2023 0813 by Robert Albrecht, RRT  Outcome: Ongoing, Progressing

## 2023-07-23 NOTE — NURSING
Nurses Note -- 4 Eyes      07/22/2023   11:10 PM      Skin assessed during: Q Shift Change      [] No Altered Skin Integrity Present    [x]Prevention Measures Documented      [x] Yes- Altered Skin Integrity Present or Discovered   [] LDA Added if Not in Epic (Describe Wound)   [] New Altered Skin Integrity was Present on Admit and Documented in LDA   [] Wound Image Taken    Wound Care Consulted? No    Attending Nurse:  Claudean M Mitchell, LPN     Second RN/Staff Member: JARON Adair RN

## 2023-07-23 NOTE — NURSING
Nurses Note -- 4 Eyes      7/23/2023   12:51 PM      Skin assessed during: Q Shift Change      [x] No Altered Skin Integrity Present    [x]Prevention Measures Documented      [] Yes- Altered Skin Integrity Present or Discovered   [] LDA Added if Not in Epic (Describe Wound)   [] New Altered Skin Integrity was Present on Admit and Documented in LDA   [] Wound Image Taken    Wound Care Consulted? No    Attending Nurse:  Liana Holt LPN     Second RN/Staff Member:  STEVE Rashid RN

## 2023-07-24 NOTE — PROGRESS NOTES
Clinical Pharmacy Chart Review Note      Admit Date: 7/13/2023   LOS: 11 days       Tosin Cortez is a 89 y.o. female admitted to SNF for PT/OT after hospitalization for s/p surgical reduction of a right hip dislocation.    Active Hospital Problems    Diagnosis  POA    *Frailty syndrome in geriatric patient [R54]  Yes    Hip dislocation, right, initial encounter [S73.004A]  Yes    Current mild episode of major depressive disorder without prior episode [F32.0]  Yes     Chronic    Chronic low back pain without sciatica [M54.50, G89.29]  Yes    HTN (hypertension) [I10]  Yes      Resolved Hospital Problems   No resolved problems to display.     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 02/01/2023    Major neurocognitive disorder, due to vascular disease, without behavioral disturbance, mild 02/01/2023    Pulmonary air trapping 06/16/2022    Paresthesia of right upper and lower extremity 05/30/2022    Right arm weakness 05/30/2022    Weight loss 10/25/2021    Chronic low back pain without sciatica 10/25/2021    Vitamin D deficiency 09/20/2021    Weakness 08/09/2021    HTN (hypertension) 06/14/2021    Other insomnia 06/14/2021    Hyperlipemia 06/14/2021    Dysuria 06/14/2021    Osteoarthritis 06/14/2021       Scheduled Meds:    aspirin  81 mg Oral Daily    atorvastatin  80 mg Oral Daily    clopidogreL  75 mg Oral Daily    colesevelam  625 mg Oral BID WM    losartan  100 mg Oral Daily    metoprolol tartrate  50 mg Oral BID    traZODone  100 mg Oral QHS    zinc oxide   Topical (Top) Daily     Continuous Infusions:   PRN Meds: acetaminophen, calcium carbonate, melatonin, oxyCODONE-acetaminophen, senna-docusate 8.6-50  mg    OBJECTIVE:     Vital Signs (Last 24H)  Temp:  [97.5 °F (36.4 °C)-98.6 °F (37 °C)]   Pulse:  [69-70]   Resp:  [17-18]   BP: (140-167)/(62-71)   SpO2:  [97 %-99 %]     Laboratory:  CBC:   Recent Labs   Lab 07/21/23  0538   WBC 5.59   RBC 4.00*   HGB 13.3   HCT 40.8   *   .0*   MCH 33.3*   MCHC 32.6     BMP:   Recent Labs   Lab 07/21/23  0538         K 4.2   CL 98   CO2 42*   BUN 15   CREATININE 0.59   CALCIUM 8.7     .    ASSESSMENT/PLAN:     Estimated Creatinine Clearance: 64.6 mL/min (based on SCr of 0.59 mg/dL).Medications reviewed, no dose adjustments needed. Weekly swing bed medication regimen review complete.  Will continue to monitor.  Mikey Erickson, Pharm. D.  Director of Pharmacy  [facility]  [phone]  [email or web address]

## 2023-07-24 NOTE — PT/OT/SLP PROGRESS
Physical Therapy Treatment    Patient Name:  Tosin Cortez   MRN:  [id number]    Recommendations:     Discharge Recommendations: intermediate care facility/nursing home  Discharge Equipment Recommendations: none  Barriers to discharge: Inaccessible home    Assessment:     Tosin Cortez is a 89 y.o. female admitted with a medical diagnosis of Frailty syndrome in geriatric patient.  She presents with the following impairments/functional limitations: weakness, impaired endurance, impaired functional mobility, gait instability, impaired balance, decreased lower extremity function. PTA provided patient with visual, verbal, and tactile cues for proper hand placement during sit to stand transfer and for RW management throughout treatment to increase safety. Patient reported weakness after completion of treatment, but she had no other complaints.     Rehab Prognosis: Fair; patient would benefit from acute skilled PT services to address these deficits and reach maximum level of function.    Recent Surgery: * No surgery found *      Plan:     During this hospitalization, patient to be seen 5 x/week to address the identified rehab impairments via gait training, therapeutic activities, therapeutic exercises and progress toward the following goals:    Plan of Care Expires:  08/04/23    Subjective     Chief Complaint: weakness  Patient/Family Comments/goals: Pt to dc to [facility].  Pain/Comfort:  Pain Rating 1: 0/10      Objective:     Communicated with pt prior to session. Patient found HOB elevated with oxygen upon PTA entry to room.     General Precautions: Standard, hearing impaired, fall  Orthopedic Precautions: RLE weight bearing as tolerated, RLE anterior precautions  Braces:    Respiratory Status: Nasal cannula, flow 2 L/min     Functional Mobility:  Bed Mobility:     Supine to Sit: stand by assistance  Sit to Supine: minimum assistance and for RLE  Transfers:     Sit to Stand:  moderate assistance and of 1 persons with  rolling walker  Gait: Pt amb'd with RW x 32 ft + 14 ft with mod Ax1.  Balance: Patient performed dynamic sitting and standing balance with mod A from PTA for donning and doffing shoes and adjusting pants.  Chair to bed: stand pivot transfer with Mod A and with RW      AM-PAC 6 CLICK MOBILITY  Turning over in bed (including adjusting bedclothes, sheets and blankets)?: 4  Sitting down on and standing up from a chair with arms (e.g., wheelchair, bedside commode, etc.): 4  Moving from lying on back to sitting on the side of the bed?: 3  Moving to and from a bed to a chair (including a wheelchair)?: 3  Need to walk in hospital room?: 3  Climbing 3-5 steps with a railing?: 1  Basic Mobility Total Score: 18       Treatment & Education:  Patient performed mobility above    Patient left HOB elevated with call button in reach.    GOALS:   Multidisciplinary Problems       Physical Therapy Goals          Problem: Physical Therapy    Goal Priority Disciplines Outcome Goal Variances Interventions   Physical Therapy Goal     PT, PT/OT Ongoing, Progressing     Description: LTG - 3 weeks  1. Pt SBA with supine to/from sit using strap or cane to life RLE onto bed surface.-PROGRESSING  2. Pt will amb 150 ft with SBA using RW.-PROGRESSING  3. Pt will be SBA with transfers throughout.-PROGRESSING                         Time Tracking:     PT Received On: 07/24/23  PT Start Time: 1519     PT Stop Time: 1542  PT Total Time (min): 23 min     Billable Minutes: Gait Training 10 and Therapeutic Activity 13    Treatment Type: Treatment  PT/PTA: PTA     Number of PTA visits since last PT visit: 3     07/24/2023

## 2023-07-24 NOTE — PLAN OF CARE
Problem: Occupational Therapy  Goal: Occupational Therapy Goal  Description: STG: within 2 weeks  Pt will perform grooming with independence at sinkside from seated ONGOING/PROGRESSING  Pt will bathe with setup and mod I sinkside ONGOING/PROGRESSING  Pt will perform UE dressing with setup and independently from bedside ONGOING/PROGRESSING  Pt will perform LE dressing with setup and mod I from bedside ONGOING/PROGRESSING  Pt will sit EOB x 30 min with no assistance ONGOING/PROGRESSING  Pt will transfer bed/chair/bsc with mod I ONGOING/PROGRESSING  Pt will perform standing task x 3 min with no assistance ONGOING/PROGRESSING  Pt will tolerate 35 minutes of tx without fatigue MET      LT.Restore to max I with self care and mobility.     Outcome: Ongoing, Progressing

## 2023-07-24 NOTE — PT/OT/SLP PROGRESS
"Occupational Therapy  Treatment    Tosin Cortez   MRN: [id number]   Admitting Diagnosis: Frailty syndrome in geriatric patient    OT Date of Treatment: 07/24/23   OT Start Time: 0947  OT Stop Time: 1030  OT Total Time (min): 43 min    Billable Minutes:  Therapeutic Activity 20 and Therapeutic Exercise 23    OT/PATTI: OT          General Precautions: Standard, fall  Orthopedic Precautions: RLE anterior precautions  Braces: N/A  Respiratory Status: Nasal cannula, flow 3 L/min         Subjective:  Communicated with pt prior to session.  "I feel poorly today."       Objective:        Functional Mobility:  See PT    Activities of Daily Living:     Feeding adaptive equipment:  none needed     UE adaptive equipment: Reacher, Long Handled Shoe Horn, Dressing Stick, and Walker     LE adaptive equipment: Reacher, Long Handled Shoe Horn, Dressing Stick, and Sock aid                     Bathing adaptive equipment: long-handled sponge    Balance:   Static Sit: FAIR: Maintains without assist, but unable to take any challenges   Dynamic Sit: FAIR+: Maintains balance through MINIMAL excursions of active trunk motion  Static Stand: FAIR: Maintains without assist but unable to take challenges  Dynamic stand: FAIR: Needs CONTACT GUARD during gait    Therapeutic Activities and Exercises:  To improve endurance, strength, OT facilitated pt with:  UBE x 10min with no RB's throughout, low level resist  GREEN PUTTY to simulate kitchen prep task and improve FM and     To improve reach, AROM, FM/ and trunk rotation with core forward leaning engagement:   Towel scrubbing at tabletop with 5# weighted towel in multi directional pushes/pulls x 6 min  seated Reciprocal pulleys x 5 min in sh flexion and abduction    To increase functional mobility skills:  OT engaged pt in w/c prop in hallway to get back to  room >60 feet, pt requiring occasional min a and min cueing for proper hand placement; pt is not at independent level for effective w/c " "propulsion safety response.      AM-PAC 6 CLICK ADL   How much help from another person does this patient currently need?   1 = Unable, Total/Dependent Assistance  2 = A lot, Maximum/Moderate Assistance  3 = A little, Minimum/Contact Guard/Supervision  4 = None, Modified [place]/Independent    Putting on and taking off regular lower body clothing? : 2  Bathing (including washing, rinsing, drying)?: 3  Toileting, which includes using toilet, bedpan, or urinal? : 3  Putting on and taking off regular upper body clothing?: 3  Taking care of personal grooming such as brushing teeth?: 4  Eating meals?: 4  Daily Activity Total Score: 19     AM-PAC Raw Score CMS "G-Code Modifier Level of Impairment Assistance   6 % Total / Unable   7 - 8 CM 80 - 100% Maximal Assist   9-13 CL 60 - 80% Moderate Assist   14 - 19 CK 40 - 60% Moderate Assist   20 - 22 CJ 20 - 40% Minimal Assist   23 CI 1-20% SBA / CGA   24 CH 0% Independent/ Mod I       Patient left up in chair with call button in reach    ASSESSMENT:  Tosin Cortez is a 89 y.o. female with a medical diagnosis of Frailty syndrome in geriatric patient and presents with feeling poorly today.    Rehab identified problem list/impairments:  weakness, impaired endurance, impaired self care skills, impaired functional mobility, gait instability, impaired balance, decreased lower extremity function, orthopedic precautions    Rehab potential is good.    Activity tolerance: Good    Discharge recommendations: home, home with home health, home health PT, home health OT   Barriers to discharge: Barriers to Discharge: None    Equipment recommendations: none    GOALS:   Multidisciplinary Problems       Occupational Therapy Goals          Problem: Occupational Therapy    Goal Priority Disciplines Outcome Interventions   Occupational Therapy Goal     OT, PT/OT Ongoing, Progressing    Description: STG: within 2 weeks  Pt will perform grooming with independence at sinkside from seated " ONGOING/PROGRESSING  Pt will bathe with setup and mod I sinkside ONGOING/PROGRESSING  Pt will perform UE dressing with setup and independently from bedside ONGOING/PROGRESSING  Pt will perform LE dressing with setup and mod I from bedside ONGOING/PROGRESSING  Pt will sit EOB x 30 min with no assistance ONGOING/PROGRESSING  Pt will transfer bed/chair/bsc with mod I ONGOING/PROGRESSING  Pt will perform standing task x 3 min with no assistance ONGOING/PROGRESSING  Pt will tolerate 35 minutes of tx without fatigue MET      LT.Restore to max I with self care and mobility.                          Plan:  Patient to be seen 5 x/week to address the above listed problems via self-care/home management, therapeutic activities, therapeutic exercises, wheelchair management/training  Plan of Care expires: 23  Plan of Care reviewed with: patient         2023

## 2023-07-24 NOTE — NURSING
Nurses Note -- 4 Eyes      7/23/2023   7:38 PM      Skin assessed during: Q Shift Change      [x] No Altered Skin Integrity Present    []Prevention Measures Documented      [] Yes- Altered Skin Integrity Present or Discovered   [] LDA Added if Not in Epic (Describe Wound)   [] New Altered Skin Integrity was Present on Admit and Documented in LDA   [] Wound Image Taken    Wound Care Consulted? No    Attending Nurse:  Sukumar Albrecht LPN     Second RN/Staff Member:  Maria Luisa Thompson RN

## 2023-07-25 NOTE — PLAN OF CARE
Problem: Breathing Pattern Ineffective  Goal: Effective Breathing Pattern  Outcome: Ongoing, Progressing     Problem: Gas Exchange Impaired  Goal: Optimal Gas Exchange  Outcome: Ongoing, Progressing     Problem: Infection  Goal: Absence of Infection Signs and Symptoms  Outcome: Ongoing, Progressing

## 2023-07-25 NOTE — NURSING
"Help BP meds this morning due to low bp 104/53. Patient with a little "tremor " this morning . Patient states she does that when she gets "nervous" will continue to monitor.  "

## 2023-07-25 NOTE — PT/OT/SLP PROGRESS
Physical Therapy Treatment    Patient Name:  Tosin Cortez   MRN:  [id number]    Recommendations:     Discharge Recommendations: intermediate care facility/nursing home  Discharge Equipment Recommendations: none  Barriers to discharge: Inaccessible home    Assessment:     Tosin Cortez is a 89 y.o. female admitted with a medical diagnosis of Frailty syndrome in geriatric patient.  She presents with the following impairments/functional limitations: weakness, impaired endurance, impaired functional mobility, gait instability, impaired balance, decreased lower extremity function. RN assessed patient's BP prior to treatment which was 98/42 electronically. Patient then asked to bed transferred back to bed so PTA and RN assisted patient back to bed and RN assessed BP manually which was 110/60. Patient reported that she felt dizzy when first standing up during both sit to stand transfers. Patient was agreeable to perform bed exercises. After completion of treatment patient stated that she felt tired.     Rehab Prognosis: Fair; patient would benefit from acute skilled PT services to address these deficits and reach maximum level of function.    Recent Surgery: * No surgery found *      Plan:     During this hospitalization, patient to be seen 5 x/week to address the identified rehab impairments via gait training, therapeutic activities, therapeutic exercises and progress toward the following goals:    Plan of Care Expires:  08/04/23    Subjective     Chief Complaint: Patient stated that she felt tired after treatment.   Patient/Family Comments/goals: Pt to dc to nsg home.  Pain/Comfort:  Pain Rating 1: 0/10      Objective:     Communicated with pt and RN prior to session. Patient found up in chair with oxygen upon PTA entry to room.     General Precautions: Standard, fall  Orthopedic Precautions: RLE weight bearing as tolerated, RLE anterior precautions  Braces:    Respiratory Status: Nasal cannula, flow 2 L/min      Functional Mobility:  Bed Mobility:     Scooting: stand by assistance  Sit to Supine: moderate assistance and for RLE  Transfers:     Sit to Stand:  moderate assistance with rolling walker x2  Chair to bed: stand pivot transfer with Mod A and with RW      AM-PAC 6 CLICK MOBILITY  Turning over in bed (including adjusting bedclothes, sheets and blankets)?: 4  Sitting down on and standing up from a chair with arms (e.g., wheelchair, bedside commode, etc.): 3  Moving from lying on back to sitting on the side of the bed?: 3  Moving to and from a bed to a chair (including a wheelchair)?: 2  Need to walk in hospital room?: 3  Climbing 3-5 steps with a railing?: 1  Basic Mobility Total Score: 16       Treatment & Education:  Patient performed mobility above    Patient left HOB elevated with call button in reach.    GOALS:   Multidisciplinary Problems       Physical Therapy Goals          Problem: Physical Therapy    Goal Priority Disciplines Outcome Goal Variances Interventions   Physical Therapy Goal     PT, PT/OT Ongoing, Progressing     Description: LTG - 3 weeks  1. Pt SBA with supine to/from sit using strap or cane to life RLE onto bed surface.-PROGRESSING  2. Pt will amb 150 ft with SBA using RW.-PROGRESSING  3. Pt will be SBA with transfers throughout.-PROGRESSING                         Time Tracking:     PT Received On: 07/25/23  PT Start Time: 1255     PT Stop Time: 1313  PT Total Time (min): 18 min     Billable Minutes: Therapeutic Activity 13 and Therapeutic Exercise 5    Treatment Type: Treatment  PT/PTA: PTA     Number of PTA visits since last PT visit: 4     07/25/2023

## 2023-07-25 NOTE — NURSING
Nurses Note -- 4 Eyes      7/24/2023   7:31 PM      Skin assessed during: Q Shift Change      [x] No Altered Skin Integrity Present    []Prevention Measures Documented      [] Yes- Altered Skin Integrity Present or Discovered   [] LDA Added if Not in Epic (Describe Wound)   [] New Altered Skin Integrity was Present on Admit and Documented in LDA   [] Wound Image Taken    Wound Care Consulted? No    Attending Nurse:  Sukumar Albrecht LPN     Second RN/Staff Member:  Martha Mora RN

## 2023-07-25 NOTE — PT/OT/SLP PROGRESS
"Occupational Therapy      Patient Name:  Tosin Cortez   MRN:  [id number]    Patient not seen today secondary to Other (Comment), Therapist assessment (nurse stopped OT and informed OT pt did not seem herself today; upon OT arriving to initiate therapy with pt, pt states, "I don't think I'm going to be able to do it today."). Will follow-up tomorrow; nursing is planning to report pt status to physician.  No obvious symptoms, but OT and nursing discussed the possibility that UTI might need confirming that it has indeed cleared.    7/25/2023  "

## 2023-07-25 NOTE — PLAN OF CARE
I spoke with Mr. Gerardony, he reports that he will get in touch with [facility] to begin paperwork for long term care.

## 2023-07-26 NOTE — NURSING
Nurses Note -- 4 Eyes      7/25/2023   7:01 PM      Skin assessed during: Q Shift Change      [x] No Altered Skin Integrity Present    []Prevention Measures Documented      [] Yes- Altered Skin Integrity Present or Discovered   [] LDA Added if Not in Epic (Describe Wound)   [] New Altered Skin Integrity was Present on Admit and Documented in LDA   [] Wound Image Taken    Wound Care Consulted? No    Attending Nurse:  Sukumar Albrecht LPN     Second RN/Staff Member:  Vonda Langley RN

## 2023-07-26 NOTE — PT/OT/SLP PROGRESS
Physical Therapy Treatment    Patient Name:  Tosin Cortez   MRN:  [id number]    Recommendations:     Discharge Recommendations: intermediate care facility/nursing home  Discharge Equipment Recommendations: none  Barriers to discharge: Inaccessible home and Decreased caregiver support    Assessment:     Tosin Cortez is a 89 y.o. female admitted with a medical diagnosis of Frailty syndrome in geriatric patient.  She presents with the following impairments/functional limitations: weakness, impaired endurance, impaired functional mobility, gait instability, impaired balance, impaired cognition, impaired cardiopulmonary response to activity .    Pt remained on O2 at 2L/min throughout PT session.    Rehab Prognosis: Fair; patient would benefit from acute skilled PT services to address these deficits and reach maximum level of function.    Recent Surgery: * No surgery found *      Plan:     During this hospitalization, patient to be seen 5 x/week to address the identified rehab impairments via gait training, therapeutic activities, therapeutic exercises and progress toward the following goals:    Plan of Care Expires:  08/04/23    Subjective     Chief Complaint: Pt states she is feeling better. States her BP was better this morning.  Patient/Family Comments/goals: Pt to dc he nsg home.  Pain/Comfort:  Pain Rating 1: 0/10      Objective:     Communicated with CNA prior to session. Pt had just finished having a bed bath. Patient found up in chair with oxygen upon PT entry to room.     General Precautions: Standard, fall, respiratory  Orthopedic Precautions: RLE weight bearing as tolerated, RLE anterior precautions  Braces:    Respiratory Status: Nasal cannula, flow 2 L/min     Functional Mobility:  Transfers:     Sit to Stand:  stand by assistance with rolling walker  Gait: Pt amb'd with RW x 28 ft with SBA with wc trail.      AM-PAC 6 CLICK MOBILITY          Treatment & Education:  Seated ther ex BLE: ball squeezes x 20,  resisted HS curls using red tband x 15, LAQ 1.5# X 15, PF 1.5# x 15, hip flex 1.5#. Pt performed STS x 5 reps from wc for quad/glute strengthening. Pt amb'd x 28 ft using RW/wc trail with SBA. PT could not obtain SPO2 at rest due to pt's cold fingers, but after treatment it was 92-93% on 2L/min.    Patient left up in chair with all lines intact..    GOALS:   Multidisciplinary Problems       Physical Therapy Goals          Problem: Physical Therapy    Goal Priority Disciplines Outcome Goal Variances Interventions   Physical Therapy Goal     PT, PT/OT Ongoing, Progressing     Description: LTG - 3 weeks  1. Pt SBA with supine to/from sit using strap or cane to life RLE onto bed surface.-PROGRESSING  2. Pt will amb 150 ft with SBA using RW.-PROGRESSING  3. Pt will be SBA with transfers throughout.-PROGRESSING                         Time Tracking:     PT Received On: 07/26/23  PT Start Time: 1025     PT Stop Time: 1050  PT Total Time (min): 25 min     Billable Minutes: Gait Training 5, Therapeutic Activity 8, Therapeutic Exercise 12, and Total Time 25 min    Treatment Type: Treatment  PT/PTA: PT     Number of PTA visits since last PT visit: 0     07/26/2023

## 2023-07-26 NOTE — PT/OT/SLP PROGRESS
Occupational Therapy   Treatment    Name: Tosin Cortez  MRN: [id number]  Admitting Diagnosis:  Frailty syndrome in geriatric patient       Recommendations:     Discharge Recommendations: home, home with home health, home health PT, home health OT  Discharge Equipment Recommendations:  none  Barriers to discharge:  None    Assessment:     Tosin Cortez is a 89 y.o. female with a medical diagnosis of Frailty syndrome in geriatric patient.  She presents with feels better today. Performance deficits affecting function are weakness, impaired endurance, impaired self care skills, impaired functional mobility, gait instability, impaired balance, decreased lower extremity function, orthopedic precautions.     Rehab Prognosis:  Fair; patient would benefit from acute skilled OT services to address these deficits and reach maximum level of function.       Plan:     Patient to be seen 5 x/week to address the above listed problems via self-care/home management, therapeutic activities, therapeutic exercises, wheelchair management/training  Plan of Care Expires: 08/04/23  Plan of Care Reviewed with: patient    Subjective     Chief Complaint: generalized weakness  Patient/Family Comments/goals: wants to go home with family again  Pain/Comfort:       Objective:     Communicated with: pt prior to session.  Patient found up in chair with  (no lines or drains) upon OT entry to room.    General Precautions: Standard, fall    Orthopedic Precautions:RLE anterior precautions  Braces: N/A  Respiratory Status: Nasal cannula, flow 2 L/min     Occupational Performance:     Bed Mobility:    Na with OT today    Activities of Daily Living:  Toileting: minimum assistance needs assist to get briefs managed, CGA for transfer      [facility] 6 Click ADL:      Treatment & Education:  To improve endurance, strength, OT facilitated pt with:  UBE x 10min with no RB's throughout, low level resist  GREEN PUTTY to simulate kitchen prep task and improve FM and        Patient left  toileting  with call button in reach and CNA notified    GOALS:   Multidisciplinary Problems       Occupational Therapy Goals          Problem: Occupational Therapy    Goal Priority Disciplines Outcome Interventions   Occupational Therapy Goal     OT, PT/OT Ongoing, Progressing    Description: STG: within 2 weeks  Pt will perform grooming with independence at sinkside from seated ONGOING/PROGRESSING  Pt will bathe with setup and mod I sinkside ONGOING/PROGRESSING  Pt will perform UE dressing with setup and independently from bedside ONGOING/PROGRESSING  Pt will perform LE dressing with setup and mod I from bedside ONGOING/PROGRESSING  Pt will sit EOB x 30 min with no assistance ONGOING/PROGRESSING  Pt will transfer bed/chair/bsc with mod I ONGOING/PROGRESSING  Pt will perform standing task x 3 min with no assistance ONGOING/PROGRESSING  Pt will tolerate 35 minutes of tx without fatigue MET      LT.Restore to max I with self care and mobility.                          Time Tracking:     OT Date of Treatment: 23  OT Start Time: 1053  OT Stop Time: 1121  OT Total Time (min): 28 min    Billable Minutes:Self Care/Home Management 2  Therapeutic Activity 15  Therapeutic Exercise 9    OT/PATTI: OT          2023

## 2023-07-27 NOTE — PT/OT/SLP PROGRESS
"Physical Therapy Treatment    Patient Name:  Tosin Cortez   MRN:  [id number]    Recommendations:     Discharge Recommendations: intermediate care facility/nursing home  Discharge Equipment Recommendations: none  Barriers to discharge: Inaccessible home    Assessment:     Tosin Cortez is a 89 y.o. female admitted with a medical diagnosis of Frailty syndrome in geriatric patient.  She presents with the following impairments/functional limitations: weakness, impaired endurance, impaired functional mobility, gait instability, impaired balance, impaired cognition, impaired cardiopulmonary response to activity .    Rehab Prognosis: Fair; patient would benefit from acute skilled PT services to address these deficits and reach maximum level of function.    Recent Surgery: * No surgery found *      Plan:     During this hospitalization, patient to be seen 5 x/week to address the identified rehab impairments via gait training, therapeutic activities, therapeutic exercises, neuromuscular re-education and progress toward the following goals:    Plan of Care Expires:  08/04/23    Subjective     Chief Complaint: Pt c/o "I'm not bouncing back as quickly as I used to".  Patient/Family Comments/goals: Pt to dc to [facility] home.  Pain/Comfort:  Pain Rating 1: 0/10      Objective:     Communicated with CNA prior to session. Pt finished toileting. Patient found up in chair with oxygen upon PT entry to room.     General Precautions: Standard, fall, respiratory  Orthopedic Precautions: RLE weight bearing as tolerated, RLE anterior precautions  Braces:    Respiratory Status: Nasal cannula, flow 2 L/min     Functional Mobility:  Transfers:     Sit to Stand:  stand by assistance with rolling walker  Toilet Transfer: contact guard assistance with  rolling walker and BSC  using  Stand Pivot  Gait: Pt amb'd x 40 ft with SBA using RW/O2/wc trail.      AM-PAC 6 CLICK MOBILITY          Treatment & Education:  BSC transfer with CGA. Nu-step at Level " 2.0 x 6 minutes with two short rest breaks. SPO2 monitored with resting sats 87%.    Patient left up in chair with all lines intact, CNA present, and set up with lunch tray .    GOALS:   Multidisciplinary Problems       Physical Therapy Goals          Problem: Physical Therapy    Goal Priority Disciplines Outcome Goal Variances Interventions   Physical Therapy Goal     PT, PT/OT Ongoing, Progressing     Description: LTG - 3 weeks  1. Pt SBA with supine to/from sit using strap or cane to life RLE onto bed surface.-PROGRESSING  2. Pt will amb 150 ft with SBA using RW.-PROGRESSING  3. Pt will be SBA with transfers throughout.-PROGRESSING                         Time Tracking:     PT Received On: 07/27/23  PT Start Time: 1103     PT Stop Time: 1126  PT Total Time (min): 23 min     Billable Minutes: Gait Training 10, Therapeutic Activity 5, Therapeutic Exercise 8, and Total Time 23 minutes    Treatment Type: Treatment  PT/PTA: PT     Number of PTA visits since last PT visit: 0     07/27/2023

## 2023-07-27 NOTE — PT/OT/SLP PROGRESS
"Occupational Therapy   Treatment    Name: Tosin Cortez  MRN: [id number]  Admitting Diagnosis:  Frailty syndrome in geriatric patient       Recommendations:     Discharge Recommendations: home, home with home health, home health PT, home health OT  Discharge Equipment Recommendations:  none  Barriers to discharge:  None    Assessment:     Tsoin Cortez is a 89 y.o. female with a medical diagnosis of Frailty syndrome in geriatric patient.  She presents with no new complaints, rather stating, "my appetite has come back!" Performance deficits affecting function are weakness, impaired endurance, impaired self care skills, impaired functional mobility, gait instability, impaired balance, decreased lower extremity function, orthopedic precautions.     Rehab Prognosis:  Good; patient would benefit from acute skilled OT services to address these deficits and reach maximum level of function.       Plan:     Patient to be seen 5 x/week to address the above listed problems via self-care/home management, therapeutic activities, therapeutic exercises, wheelchair management/training  Plan of Care Expires: 08/04/23  Plan of Care Reviewed with: patient    Subjective     Chief Complaint: weakness  Patient/Family Comments/goals: return home with family at sba level  Pain/Comfort:       Objective:     Communicated with: pt prior to session.  Patient found up in chair with  (no lines or drains) upon OT entry to room.    General Precautions: Standard, fall    Orthopedic Precautions:RLE anterior precautions  Braces: N/A  Respiratory Status: Nasal cannula, flow 2 L/min     Occupational Performance:       AMPAC 6 Click ADL:      Treatment & Education:  To improve endurance, strength, OT facilitated pt with:  UBE x 10min with 1 RB  throughout, low level resist  BLUE putty x 8 minutes    To improve reach, AROM, FM/ and trunk rotation with core forward leaning engagement:   Towel scrubbing at tabletop with 3# weighted towel in multi " directional pushes/pulls  seated Reciprocal pulleys x 6 min in sh flexion and abduction      Patient left up in chair with call button in reach    GOALS:   Multidisciplinary Problems       Occupational Therapy Goals          Problem: Occupational Therapy    Goal Priority Disciplines Outcome Interventions   Occupational Therapy Goal     OT, PT/OT Ongoing, Progressing    Description: STG: within 2 weeks  Pt will perform grooming with independence at sinkside from seated ONGOING/PROGRESSING  Pt will bathe with setup and mod I sinkside ONGOING/PROGRESSING  Pt will perform UE dressing with setup and independently from bedside ONGOING/PROGRESSING  Pt will perform LE dressing with setup and mod I from bedside ONGOING/PROGRESSING  Pt will sit EOB x 30 min with no assistance ONGOING/PROGRESSING  Pt will transfer bed/chair/bsc with mod I ONGOING/PROGRESSING  Pt will perform standing task x 3 min with no assistance ONGOING/PROGRESSING  Pt will tolerate 35 minutes of tx without fatigue MET      LT.Restore to max I with self care and mobility.                          Time Tracking:     OT Date of Treatment: 23  OT Start Time: 1246  OT Stop Time: 1325  OT Total Time (min): 39 min    Billable Minutes:Therapeutic Activity 15  Therapeutic Exercise 24    OT/PATTI: OT          2023

## 2023-07-27 NOTE — PLAN OF CARE
Problem: Adult Inpatient Plan of Care  Goal: Plan of Care Review  Outcome: Ongoing, Progressing  Goal: Patient-Specific Goal (Individualized)  Outcome: Ongoing, Progressing  Goal: Absence of Hospital-Acquired Illness or Injury  Outcome: Ongoing, Progressing  Goal: Optimal Comfort and Wellbeing  Outcome: Ongoing, Progressing  Goal: Readiness for Transition of Care  Outcome: Ongoing, Progressing     Problem: Pain Acute  Goal: Acceptable Pain Control and Functional Ability  Outcome: Ongoing, Progressing     Problem: Fall Injury Risk  Goal: Absence of Fall and Fall-Related Injury  Outcome: Ongoing, Progressing     Problem: Skin Injury Risk Increased  Goal: Skin Health and Integrity  Outcome: Ongoing, Progressing     Problem: UTI (Urinary Tract Infection)  Goal: Improved Infection Symptoms  Outcome: Ongoing, Progressing

## 2023-07-28 NOTE — PROGRESS NOTES
"[facility] Surgical Unit    Clinical Nutrition Follow-Up         Date: 7/28/2023 12:49 PM    Consult received 7/13 re: Assess/educate regarding nutritional needs    Tosin Cortez is a 89 y.o. 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       PMH:  has a past medical history of Current mild episode of major depressive disorder without prior episode (2/1/2023), Herpes zoster without complication (7/12/2021), Hypertension, Major neurocognitive disorder, due to vascular disease, without behavioral disturbance, mild (2/1/2023), and Stroke.    Nutrition/Diet History  Spiritual, Cultural Beliefs, Episcopal Practices, Values that Affect Care: no    Diet Order: Diet Adult Regular (IDDSI Level 7)  Oral Supplements: none     7/14/23: eating 50-75% meals. Adequate.  7/21/23: weight stable. Po intake variable. Drinking Ensure.   7/27/23: Po intake continues to be variable. Per documentation today, pt with poor appetite and 25% po intake. Weight with some fluctuations but not significantly. BMP WNL.     Anthropometrics:   Height: 5' 5" (165.1 cm)  Weight: 75.8 kg (167 lb 1.6 oz)  IBW: 125#   BMI (Calculated): 27.8  BMI Classification: WNL (per range for geriatric age)     7/20/23: 76 kg   7/27/23: 73.7 kg     Labs:   Recent Labs   Lab 07/28/23  0519      K 4.6   BUN 15   CREATININE 0.65   GLU 96   CALCIUM 8.6   CL 98       Last A1c:   Lab Results   Component Value Date    HGBA1C 4.8 05/30/2022     Lab Results   Component Value Date    RBC 3.58 (L) 07/28/2023    HGB " 11.9 (L) 2023    HCT 37.0 (L) 2023    .4 (H) 2023    MCH 33.2 (H) 2023    MCHC 32.2 2023       Meds:   Scheduled Meds:   aspirin  81 mg Oral Daily    atorvastatin  80 mg Oral Daily    clopidogreL  75 mg Oral Daily    colesevelam  625 mg Oral BID WM    losartan  100 mg Oral Daily    metoprolol tartrate  50 mg Oral BID    traZODone  100 mg Oral QHS    zinc oxide   Topical (Top) Daily     Continuous Infusions: none     PRN Meds:.acetaminophen, calcium carbonate, melatonin, oxyCODONE-acetaminophen, senna-docusate 8.6-50 mg    Physical Review:  General: 2L NC, Glasses, alert, disoriented to time and situation   Abd/GI: WDL   Last Bowel Movement: 07/27/23x2  Ext:  +1 trace edema   Skin: no skin breakdown noted     Vinh Score:   Vinh Risk Assessment  Sensory Perception: 3-->slightly limited  Moisture: 3-->occasionally moist  Activity: 3-->walks occasionally  Mobility: 3-->slightly limited  Nutrition: 3-->adequate  Friction and Shear: 2-->potential problem  Vinh Score: 17    Malnutrition Screening Tool  Have you recently lost weight without trying?: No  Weight loss score: 0  Have you been eating poorly because of a decreased appetite?: No  Appetite score: 0  MST Score: 0    Estimated Nutrition Needs:   20-25 kcal/[id number] kcal/day  1-1.2 g pro/k-91 g protein/day   6193-4920 ml fluid/day    Nutrition Diagnosis:  No pertinent nutrition dx at this time     Recommendations/ Intervention:  Continue current diet as tolerated    Offer Boost Plus when <50% meal consumed.    Weekly weights    RD to monitor po intake,wt,nutrition-related labs, and meds      Nutrition Risk: moderate    Signed  Shameka Olea, MS, RD, LD  Available via [employer]

## 2023-07-28 NOTE — PLAN OF CARE
Problem: UTI (Urinary Tract Infection)  Goal: Improved Infection Symptoms  7/28/2023 0724 by Meche Canada, RN  Outcome: Met  7/28/2023 0723 by Meche Canada RN  Outcome: Ongoing, Progressing     Finished abx therapy, denies any abnormal symptoms and states she feels better.

## 2023-07-28 NOTE — PLAN OF CARE
Problem: Adult Inpatient Plan of Care  Goal: Plan of Care Review  Outcome: Ongoing, Progressing  Goal: Patient-Specific Goal (Individualized)  Outcome: Ongoing, Progressing  Goal: Absence of Hospital-Acquired Illness or Injury  Outcome: Ongoing, Progressing  Goal: Optimal Comfort and Wellbeing  Outcome: Ongoing, Progressing  Goal: Readiness for Transition of Care  Outcome: Ongoing, Progressing     Problem: Cognitive Impairment  Goal: Optimal Cognitive Function  Outcome: Ongoing, Progressing     Problem: Pain Acute  Goal: Acceptable Pain Control and Functional Ability  Outcome: Ongoing, Progressing     Problem: Fall Injury Risk  Goal: Absence of Fall and Fall-Related Injury  Outcome: Ongoing, Progressing     Problem: Skin Injury Risk Increased  Goal: Skin Health and Integrity  Outcome: Ongoing, Progressing     Problem: Infection  Goal: Absence of Infection Signs and Symptoms  Outcome: Ongoing, Progressing     Problem: UTI (Urinary Tract Infection)  Goal: Improved Infection Symptoms  Outcome: Ongoing, Progressing

## 2023-07-28 NOTE — PT/OT/SLP PROGRESS
Physical Therapy Treatment    Patient Name:  Tosin Cortez   MRN:  [id number]    Recommendations:     Discharge Recommendations: intermediate care facility/nursing home  Discharge Equipment Recommendations: none  Barriers to discharge: Inaccessible home and Decreased caregiver support    Assessment:     Tosin Cortez is a 89 y.o. female admitted with a medical diagnosis of Frailty syndrome in geriatric patient.  She presents with the following impairments/functional limitations: weakness, impaired endurance, impaired functional mobility, gait instability, impaired balance, impaired cognition, impaired cardiopulmonary response to activity. Pt. With good participation/ motivation with tx.     Rehab Prognosis: Good; patient would benefit from acute skilled PT services to address these deficits and reach maximum level of function.    Recent Surgery: * No surgery found *      Plan:     During this hospitalization, patient to be seen 5 x/week to address the identified rehab impairments via gait training, therapeutic activities, therapeutic exercises, neuromuscular re-education and progress toward the following goals:    Plan of Care Expires:  08/04/23    Subjective     Chief Complaint: weakness  Patient/Family Comments/goals: Pt. States she does seated and supine exercises intermittently.  Pain/Comfort:  Pain Rating 1: 0/10      Objective:     Communicated with Beulah Albrecht PT for POC and Nursing  prior to session.  Patient found HOB elevated with oxygen upon PT entry to room.     General Precautions: Standard, fall, respiratory  Orthopedic Precautions: RLE weight bearing as tolerated, RLE anterior precautions  Braces: N/A  Respiratory Status: Nasal cannula, flow 2 L/min     Functional Mobility:  Bed Mobility:     Rolling Left:  modified independence  Scooting: supervision  Supine to Sit: stand by assistance  Sit to Supine: minimum assistance  Transfers:     Sit to Stand:  contact guard assistance and minimum assistance  with rolling walker  Bed to Chair: contact guard assistance and minimum assistance with  rolling walker  using  Step Transfer  Toilet Transfer: contact guard assistance and minimum assistance with  rolling walker  using  Step Transfer  Gait: pt. Amb. With RW x 17 feet with CGA to fatigue.       AM-PAC 6 CLICK MOBILITY  Turning over in bed (including adjusting bedclothes, sheets and blankets)?: 4  Sitting down on and standing up from a chair with arms (e.g., wheelchair, bedside commode, etc.): 3  Moving from lying on back to sitting on the side of the bed?: 3  Moving to and from a bed to a chair (including a wheelchair)?: 3  Need to walk in hospital room?: 3  Climbing 3-5 steps with a railing?: 1  Basic Mobility Total Score: 17       Treatment & Education:  Pt. Participated in mobility per above followed by LE strengthening PREs: Nu-Step x 8 mins @ level 1 with cue ing for hip knee and ankle alignment. Seated Hip ADD ball squeeze 2 x 15, Heel-toe raises 2 x 15 and LAQs 3 x 10 reps each bilateral.     Patient left HOB elevated with all lines intact and call button in reach..    GOALS:   Multidisciplinary Problems       Physical Therapy Goals          Problem: Physical Therapy    Goal Priority Disciplines Outcome Goal Variances Interventions   Physical Therapy Goal     PT, PT/OT Ongoing, Progressing     Description: LTG - 3 weeks  1. Pt SBA with supine to/from sit using strap or cane to life RLE onto bed surface.-PROGRESSING  2. Pt will amb 150 ft with SBA using RW.-PROGRESSING  3. Pt will be SBA with transfers throughout.-PROGRESSING                         Time Tracking:     PT Received On: 07/28/23  PT Start Time: 1505     PT Stop Time: 1604  PT Total Time (min): 59 min     Billable Minutes: Gait Training 14, Therapeutic Activity 15, and Therapeutic Exercise 30    Treatment Type: Treatment  PT/PTA: PTA     Number of PTA visits since last PT visit: 1 07/28/2023

## 2023-07-28 NOTE — SUBJECTIVE & OBJECTIVE
Interval History: doing well, still weak but she does participate     Review of Systems   Respiratory:  Negative for shortness of breath.    Cardiovascular:  Negative for chest pain.   Gastrointestinal:  Negative for abdominal pain, nausea and vomiting.   Psychiatric/Behavioral:  Negative for agitation and confusion.    All other systems reviewed and are negative.  Objective:     Vital Signs (Most Recent):  Temp: 98.2 °F (36.8 °C) (07/28/23 0802)  Pulse: 67 (07/28/23 1058)  Resp: 18 (07/28/23 1058)  BP: (!) 149/56 (07/28/23 0802)  SpO2: 97 % (07/28/23 1058) Vital Signs (24h Range):  Temp:  [98.2 °F (36.8 °C)-98.3 °F (36.8 °C)] 98.2 °F (36.8 °C)  Pulse:  [67-75] 67  Resp:  [18-20] 18  SpO2:  [94 %-97 %] 97 %  BP: (136-149)/(52-56) 149/56     Weight: 73.7 kg (162 lb 6.4 oz)  Body mass index is 27.02 kg/m².    Intake/Output Summary (Last 24 hours) at 7/28/2023 1147  Last data filed at 7/28/2023 0847  Gross per 24 hour   Intake 1520 ml   Output --   Net 1520 ml         Physical Exam  Vitals reviewed.   Constitutional:       General: She is not in acute distress.     Appearance: Normal appearance.   HENT:      Head: Normocephalic and atraumatic.   Eyes:      General: No scleral icterus.     Extraocular Movements: Extraocular movements intact.      Conjunctiva/sclera: Conjunctivae normal.      Pupils: Pupils are equal, round, and reactive to light.   Cardiovascular:      Rate and Rhythm: Normal rate and regular rhythm.      Heart sounds: No murmur heard.    No friction rub. No gallop.   Pulmonary:      Effort: Pulmonary effort is normal. No respiratory distress.      Breath sounds: Normal breath sounds. No wheezing or rales.   Abdominal:      General: Abdomen is flat. Bowel sounds are normal. There is no distension.      Palpations: Abdomen is soft.      Tenderness: There is no abdominal tenderness. There is no guarding.   Musculoskeletal:         General: No swelling.   Skin:     General: Skin is warm and dry.       Coloration: Skin is not jaundiced.      Findings: No rash.   Neurological:      General: No focal deficit present.      Mental Status: She is alert and oriented to person, place, and time.      Sensory: No sensory deficit.      Motor: Weakness present.   Psychiatric:         Mood and Affect: Mood normal.         Thought Content: Thought content normal.           Significant Labs: All pertinent labs within the past 24 hours have been reviewed.  Recent Lab Results         07/28/23  0519        Anion Gap 5       Baso # 0.01       Basophil % 0.2       BUN 15       BUN/CREAT RATIO 23       Calcium 8.6       Chloride 98       CO2 41       Creatinine 0.65       Differential Type Auto       eGFR 84       Eos # 0.00       Eosinophil % 0.0       Glucose 96       Hematocrit 37.0       Hemoglobin 11.9       Lymph # 1.75       Lymph % 33.4       MCH 33.2       MCHC 32.2       .4       Mono # 0.68       Mono % 13.0       MPV 10.3       Neutrophils, Abs 2.80       Neutrophils Relative 53.4       Platelets 108       Potassium 4.6       RBC 3.58       RDW 12.7       Sodium 139       WBC 5.24               Significant Imaging: I have reviewed all pertinent imaging results/findings within the past 24 hours.

## 2023-07-28 NOTE — PROGRESS NOTES
[facility]  Hospital Medicine  Progress Note    Patient Name: Tosin Cortez  MRN: [id number]  Patient Class: IP- Swing   Admission Date: 7/13/2023  Length of Stay: 15 days  Attending Physician: Won Plasencia DO  Primary Care Provider: Zechariah Camacho MD        Subjective:     Principal Problem:Frailty syndrome in geriatric patient        HPI:  Mrs. Tosin Cortez is a pleasant 89 y/o  female who has admitted to [facility] for skilled nursing services, PT/OT s/p surgical reduction of a right hip dislocation on 7/11/2023 by Dr. Pena of [facility]. She is doing well this am, No major pain.  Tolerating diet.       Overview/Hospital Course:  7/17 Seems to be doing well, UA was abnormal and started on Keflex.     7/21 Urine culture came back E. Coli, S to Cephalosporins.  Finished course.  She states she is real weak today and feels bad.  No elaboration though.  Just legs weak.     7/28 Doing well, still weak but she does participate with therapy       Interval History: doing well, still weak but she does participate     Review of Systems   Respiratory:  Negative for shortness of breath.    Cardiovascular:  Negative for chest pain.   Gastrointestinal:  Negative for abdominal pain, nausea and vomiting.   Psychiatric/Behavioral:  Negative for agitation and confusion.    All other systems reviewed and are negative.  Objective:     Vital Signs (Most Recent):  Temp: 98.2 °F (36.8 °C) (07/28/23 0802)  Pulse: 67 (07/28/23 1058)  Resp: 18 (07/28/23 1058)  BP: (!) 149/56 (07/28/23 0802)  SpO2: 97 % (07/28/23 1058) Vital Signs (24h Range):  Temp:  [98.2 °F (36.8 °C)-98.3 °F (36.8 °C)] 98.2 °F (36.8 °C)  Pulse:  [67-75] 67  Resp:  [18-20] 18  SpO2:  [94 %-97 %] 97 %  BP: (136-149)/(52-56) 149/56     Weight: 73.7 kg (162 lb 6.4 oz)  Body mass index is 27.02 kg/m².    Intake/Output Summary (Last 24 hours) at 7/28/2023 1147  Last data filed at 7/28/2023 0847  Gross per  24 hour   Intake 1520 ml   Output --   Net 1520 ml         Physical Exam  Vitals reviewed.   Constitutional:       General: She is not in acute distress.     Appearance: Normal appearance.   HENT:      Head: Normocephalic and atraumatic.   Eyes:      General: No scleral icterus.     Extraocular Movements: Extraocular movements intact.      Conjunctiva/sclera: Conjunctivae normal.      Pupils: Pupils are equal, round, and reactive to light.   Cardiovascular:      Rate and Rhythm: Normal rate and regular rhythm.      Heart sounds: No murmur heard.    No friction rub. No gallop.   Pulmonary:      Effort: Pulmonary effort is normal. No respiratory distress.      Breath sounds: Normal breath sounds. No wheezing or rales.   Abdominal:      General: Abdomen is flat. Bowel sounds are normal. There is no distension.      Palpations: Abdomen is soft.      Tenderness: There is no abdominal tenderness. There is no guarding.   Musculoskeletal:         General: No swelling.   Skin:     General: Skin is warm and dry.      Coloration: Skin is not jaundiced.      Findings: No rash.   Neurological:      General: No focal deficit present.      Mental Status: She is alert and oriented to person, place, and time.      Sensory: No sensory deficit.      Motor: Weakness present.   Psychiatric:         Mood and Affect: Mood normal.         Thought Content: Thought content normal.           Significant Labs: All pertinent labs within the past 24 hours have been reviewed.  Recent Lab Results         07/28/23  0519        Anion Gap 5       Baso # 0.01       Basophil % 0.2       BUN 15       BUN/CREAT RATIO 23       Calcium 8.6       Chloride 98       CO2 41       Creatinine 0.65       Differential Type Auto       eGFR 84       Eos # 0.00       Eosinophil % 0.0       Glucose 96       Hematocrit 37.0       Hemoglobin 11.9       Lymph # 1.75       Lymph % 33.4       MCH 33.2       MCHC 32.2       .4       Mono # 0.68       Mono % 13.0        MPV 10.3       Neutrophils, Abs 2.80       Neutrophils Relative 53.4       Platelets 108       Potassium 4.6       RBC 3.58       RDW 12.7       Sodium 139       WBC 5.24               Significant Imaging: I have reviewed all pertinent imaging results/findings within the past 24 hours.      Assessment/Plan:      * Frailty syndrome in geriatric patient  PT/OT for safety       Hip dislocation, right, initial encounter  PT and OT for physical strengthening        Current mild episode of major depressive disorder without prior episode  Patient has persistent depression which is mild and is currently controlled. Will Continue anti-depressant medications. We will not consult psychiatry at this time. Patient does not display psychosis at this time. Continue to monitor closely and adjust plan of care as needed.        Chronic low back pain without sciatica  Prn meds      HTN (hypertension)  Check home meds and resume. Numbers up at times.  May be situational.         VTE Risk Mitigation (From admission, onward)         Ordered     IP VTE HIGH RISK PATIENT  Once         07/13/23 1547     Place sequential compression device  Until discontinued         07/13/23 1547                Discharge Planning   FLACO:      Code Status: DNR   Is the patient medically ready for discharge?:     Reason for patient still in hospital (select all that apply): Patient trending condition and PT / OT recommendations  Discharge Plan A: Home with family                  Won Plasencia DO  Department of Hospital Medicine   [facility]

## 2023-07-29 NOTE — PLAN OF CARE
Problem: Adult Inpatient Plan of Care  Goal: Patient-Specific Goal (Individualized)  Outcome: Ongoing, Progressing  Flowsheets (Taken 7/29/2023 0733)  Anxieties, Fears or Concerns: falling and not going home  Individualized Care Needs: getting stronger  Goal: Absence of Hospital-Acquired Illness or Injury  Outcome: Ongoing, Progressing  Goal: Optimal Comfort and Wellbeing  Outcome: Ongoing, Progressing  Goal: Readiness for Transition of Care  Outcome: Ongoing, Progressing     Problem: Pain Acute  Goal: Acceptable Pain Control and Functional Ability  Outcome: Ongoing, Progressing     Problem: Fall Injury Risk  Goal: Absence of Fall and Fall-Related Injury  Outcome: Ongoing, Progressing  Intervention: Identify and Manage Contributors  Flowsheets (Taken 7/29/2023 0733)  Self-Care Promotion:   independence encouraged   BADL personal objects within reach   BADL personal routines maintained   safe use of adaptive equipment encouraged  Medication Review/Management: medications reviewed  Intervention: Promote Injury-Free Environment  Flowsheets (Taken 7/29/2023 0733)  Safety Promotion/Fall Prevention:   Fall Risk reviewed with patient/family   Fall Risk signage in place   bed alarm set   assistive device/personal item within reach   bedside commode chair   lighting adjusted   medications reviewed   nonskid shoes/socks when out of bed   muscle strengthening facilitated   high risk medications identified   room near unit station   supervised activity   side rails raised x 2   instructed to call staff for mobility     Problem: Skin Injury Risk Increased  Goal: Skin Health and Integrity  Outcome: Ongoing, Progressing     Problem: Infection  Goal: Absence of Infection Signs and Symptoms  Outcome: Ongoing, Progressing  Intervention: Prevent or Manage Infection  Flowsheets (Taken 7/29/2023 0733)  Fever Reduction/Comfort Measures:   fluid intake increased   lightweight bedding   lightweight clothing   aerosol temperature  decreased  Infection Management: aseptic technique maintained  Isolation Precautions: protective     Problem: Adult Inpatient Plan of Care  Goal: Plan of Care Review  Outcome: Met     Problem: Cognitive Impairment  Goal: Optimal Cognitive Function  Outcome: Met

## 2023-07-30 NOTE — NURSING
Nurses Note -- 4 Eyes      7/29/2023   7:05 PM      Skin assessed during: Q Shift Change      [] No Altered Skin Integrity Present    [x]Prevention Measures Documented      [x] Yes- Altered Skin Integrity Present or Discovered   [] LDA Added if Not in Epic (Describe Wound)   [] New Altered Skin Integrity was Present on Admit and Documented in LDA   [] Wound Image Taken    Wound Care Consulted? No    Attending Nurse:  Claudean M Mitchell, LPN     Second RN/Staff Member:  MAGDALENA Canada RN

## 2023-07-30 NOTE — PLAN OF CARE
Problem: Fall Injury Risk  Goal: Absence of Fall and Fall-Related Injury  Outcome: Ongoing, Progressing  Intervention: Identify and Manage Contributors  Flowsheets (Taken 7/30/2023 1821)  Self-Care Promotion:   independence encouraged   BADL personal objects within reach   safe use of adaptive equipment encouraged   meal set-up provided

## 2023-07-31 NOTE — PLAN OF CARE
Problem: Adult Inpatient Plan of Care  Goal: Patient-Specific Goal (Individualized)  Outcome: Ongoing, Progressing  Goal: Absence of Hospital-Acquired Illness or Injury  Outcome: Ongoing, Progressing  Goal: Optimal Comfort and Wellbeing  Outcome: Ongoing, Progressing  Goal: Readiness for Transition of Care  Outcome: Ongoing, Progressing     Problem: Pain Acute  Goal: Acceptable Pain Control and Functional Ability  Outcome: Ongoing, Progressing     Problem: Fall Injury Risk  Goal: Absence of Fall and Fall-Related Injury  Outcome: Ongoing, Progressing     Problem: Skin Injury Risk Increased  Goal: Skin Health and Integrity  Outcome: Ongoing, Progressing     Problem: Infection  Goal: Absence of Infection Signs and Symptoms  Outcome: Ongoing, Progressing

## 2023-07-31 NOTE — PT/OT/SLP PROGRESS
Physical Therapy Treatment    Patient Name:  Tosin Cortez   MRN:  [id number]    Recommendations:     Discharge Recommendations: intermediate care facility/nursing home  Discharge Equipment Recommendations: none  Barriers to discharge: Inaccessible home    Assessment:     Tosin Cortez is a 89 y.o. female admitted with a medical diagnosis of Frailty syndrome in geriatric patient.  She presents with the following impairments/functional limitations: impaired endurance, weakness, impaired self care skills, impaired functional mobility, gait instability, impaired balance. Patient frequently postures with RLE in internal rotation in sitting position, and she was educated on importance of keeping RLE in neutral position. Patient with no complaints after completion of treatment.     Rehab Prognosis: Fair; patient would benefit from acute skilled PT services to address these deficits and reach maximum level of function.    Recent Surgery: * No surgery found *      Plan:     During this hospitalization, patient to be seen 5 x/week to address the identified rehab impairments via gait training, therapeutic activities, therapeutic exercises, neuromuscular re-education and progress toward the following goals:    Plan of Care Expires:  08/04/23    Subjective     Chief Complaint: Patient with no complaints prior to treatment.   Patient/Family Comments/goals: Pt stated that she is ready to go home.   Pain/Comfort:  Pain Rating 1: 0/10      Objective:     Communicated with pt prior to session. Patient found up in chair upon PTA entry to room. Patient did not have on oxygen upon entry to room, and she had rolled her wc up to her bed. She stated that she was about to get herself in bed so she removed the oxygen. Patient was instructed to call for help before attempting transfers.     General Precautions: Standard, fall, respiratory  Orthopedic Precautions: RLE weight bearing as tolerated, RLE anterior precautions  Braces:  N/A  Respiratory Status: Nasal cannula, flow 2 L/min     Functional Mobility:  Transfers:     Sit to Stand:  minimum assistance with rolling walker  Gait: Patient ambulated 18 feet with RW, Min A x 1, and WC trail.         AM-PAC 6 CLICK MOBILITY  Turning over in bed (including adjusting bedclothes, sheets and blankets)?: 4  Sitting down on and standing up from a chair with arms (e.g., wheelchair, bedside commode, etc.): 3  Moving from lying on back to sitting on the side of the bed?: 3  Moving to and from a bed to a chair (including a wheelchair)?: 3  Need to walk in hospital room?: 3  Climbing 3-5 steps with a railing?: 1  Basic Mobility Total Score: 17       Treatment & Education:  Patient performed the following seated therapeutic exercises on BLE: LAQs 10x, Hip ADD squeezes 20x, heel/toe raises 20x    Patient left up in chair with call button in reach.    GOALS:   Multidisciplinary Problems       Physical Therapy Goals          Problem: Physical Therapy    Goal Priority Disciplines Outcome Goal Variances Interventions   Physical Therapy Goal     PT, PT/OT Ongoing, Progressing     Description: LTG - 3 weeks  1. Pt SBA with supine to/from sit using strap or cane to life RLE onto bed surface.-PROGRESSING  2. Pt will amb 150 ft with SBA using RW.-PROGRESSING  3. Pt will be SBA with transfers throughout.-PROGRESSING                         Time Tracking:     PT Received On: 07/31/23  PT Start Time: 1105     PT Stop Time: 1125  PT Total Time (min): 20 min     Billable Minutes: Gait Training 10, Therapeutic Activity 5, and Therapeutic Exercise 5    Treatment Type: Treatment  PT/PTA: PTA     Number of PTA visits since last PT visit: 2     07/31/2023

## 2023-07-31 NOTE — SUBJECTIVE & OBJECTIVE
Interval History: seems more eager to go home.  Working with therapy     Review of Systems   Respiratory:  Negative for shortness of breath.    Cardiovascular:  Negative for chest pain.   Gastrointestinal:  Negative for abdominal pain, nausea and vomiting.   Psychiatric/Behavioral:  Negative for agitation and confusion.    All other systems reviewed and are negative.    Objective:     Vital Signs (Most Recent):  Temp: 97.7 °F (36.5 °C) (07/31/23 0802)  Pulse: 67 (07/31/23 0843)  Resp: 20 (07/31/23 0843)  BP: (!) 149/62 (07/31/23 0802)  SpO2: 95 % (07/31/23 0843) Vital Signs (24h Range):  Temp:  [97.7 °F (36.5 °C)-98.1 °F (36.7 °C)] 97.7 °F (36.5 °C)  Pulse:  [67-74] 67  Resp:  [20-22] 20  SpO2:  [95 %-98 %] 95 %  BP: (114-150)/(49-63) 149/62     Weight: 73.7 kg (162 lb 6.4 oz)  Body mass index is 27.02 kg/m².    Intake/Output Summary (Last 24 hours) at 7/31/2023 1151  Last data filed at 7/31/2023 0825  Gross per 24 hour   Intake 1475 ml   Output --   Net 1475 ml         Physical Exam  Vitals reviewed.   Constitutional:       General: She is not in acute distress.     Appearance: Normal appearance.   HENT:      Head: Normocephalic and atraumatic.   Eyes:      General: No scleral icterus.     Extraocular Movements: Extraocular movements intact.      Conjunctiva/sclera: Conjunctivae normal.      Pupils: Pupils are equal, round, and reactive to light.   Cardiovascular:      Rate and Rhythm: Normal rate and regular rhythm.      Heart sounds: No murmur heard.     No friction rub. No gallop.   Pulmonary:      Effort: Pulmonary effort is normal. No respiratory distress.      Breath sounds: Normal breath sounds. No wheezing or rales.   Abdominal:      General: Abdomen is flat. Bowel sounds are normal. There is no distension.      Palpations: Abdomen is soft.      Tenderness: There is no abdominal tenderness. There is no guarding.   Musculoskeletal:         General: No swelling.   Skin:     General: Skin is warm and dry.       Coloration: Skin is not jaundiced.      Findings: No rash.   Neurological:      General: No focal deficit present.      Mental Status: She is alert and oriented to person, place, and time.      Sensory: No sensory deficit.      Motor: Weakness present.   Psychiatric:         Mood and Affect: Mood normal.         Thought Content: Thought content normal.             Significant Labs: All pertinent labs within the past 24 hours have been reviewed.  Recent Lab Results       None            Significant Imaging: I have reviewed all pertinent imaging results/findings within the past 24 hours.

## 2023-07-31 NOTE — PLAN OF CARE
Problem: Occupational Therapy  Goal: Occupational Therapy Goal  Description: STG: within 2 weeks  Pt will perform grooming with independence at sinkside from seated SBA  Pt will bathe with setup and mod I sinkside SBA  Pt will perform UE dressing with setup and independently from bedside SBA  Pt will perform LE dressing with setup and mod I from bedside SBA/CGA  Pt will sit EOB x 30 min with no assistance MET  Pt will transfer bed/chair/bsc with mod I CURRENTLY SBA  Pt will perform standing task x 3 min with no assistance CURRENTLY CGA  Pt will tolerate 35 minutes of tx without fatigue MET      LT.Restore to max I with self care and mobility.     Outcome: Ongoing, Progressing

## 2023-07-31 NOTE — PROGRESS NOTES
Clinical Pharmacy Chart Review Note      Admit Date: 7/13/2023   LOS: 18 days       Tosin Cortez is a 89 y.o. female admitted to SNF for PT/OT after hospitalization for s/p surgical reduction of a right hip dislocation.    Active Hospital Problems    Diagnosis  POA    *Frailty syndrome in geriatric patient [R54]  Yes    Hip dislocation, right, initial encounter [S73.004A]  Yes    Current mild episode of major depressive disorder without prior episode [F32.0]  Yes     Chronic    Chronic low back pain without sciatica [M54.50, G89.29]  Yes    HTN (hypertension) [I10]  Yes      Resolved Hospital Problems   No resolved problems to display.     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 02/01/2023    Major neurocognitive disorder, due to vascular disease, without behavioral disturbance, mild 02/01/2023    Pulmonary air trapping 06/16/2022    Paresthesia of right upper and lower extremity 05/30/2022    Right arm weakness 05/30/2022    Weight loss 10/25/2021    Chronic low back pain without sciatica 10/25/2021    Vitamin D deficiency 09/20/2021    Weakness 08/09/2021    HTN (hypertension) 06/14/2021    Other insomnia 06/14/2021    Hyperlipemia 06/14/2021    Dysuria 06/14/2021    Osteoarthritis 06/14/2021       Scheduled Meds:    aspirin  81 mg Oral Daily    atorvastatin  80 mg Oral Daily    clopidogreL  75 mg Oral Daily    colesevelam  625 mg Oral BID WM    losartan  100 mg Oral Daily    metoprolol tartrate  50 mg Oral BID    traZODone  100 mg Oral QHS    zinc oxide   Topical (Top) Daily     Continuous Infusions:   PRN Meds: acetaminophen, calcium carbonate, melatonin, oxyCODONE-acetaminophen, senna-docusate 8.6-50  mg    OBJECTIVE:     Vital Signs (Last 24H)  Temp:  [97.7 °F (36.5 °C)-98.1 °F (36.7 °C)]   Pulse:  [67-74]   Resp:  [20-22]   BP: (114-150)/(49-63)   SpO2:  [95 %-98 %]     Laboratory:  CBC:   Recent Labs   Lab 07/28/23  0519   WBC 5.24   RBC 3.58*   HGB 11.9*   HCT 37.0*   *   .4*   MCH 33.2*   MCHC 32.2     BMP:   Recent Labs   Lab 07/28/23 0519   GLU 96      K 4.6   CL 98   CO2 41*   BUN 15   CREATININE 0.65   CALCIUM 8.6     .    ASSESSMENT/PLAN:     Estimated Creatinine Clearance: 57.8 mL/min (based on SCr of 0.65 mg/dL).Medications reviewed, no dose adjustments needed. Weekly swing bed medication regimen review complete.  Will continue to monitor.  Mikey Erickson, Pharm. D.  Director of Pharmacy  [facility]  [phone]  [email or web address]

## 2023-07-31 NOTE — PT/OT/SLP PROGRESS
"Occupational Therapy  Treatment    Tosin Cortez   MRN: [id number]   Admitting Diagnosis: Frailty syndrome in geriatric patient    OT Date of Treatment: 07/31/23   OT Start Time: 0855  OT Stop Time: 0950  OT Total Time (min): 55 min    Billable Minutes:  Self Care/Home Management 15, Therapeutic Activity 18, and Therapeutic Exercise 22    OT/PATTI: OT          General Precautions: Standard, fall  Orthopedic Precautions: RLE anterior precautions  Braces: N/A  Respiratory Status: Nasal cannula, flow 2 L/min         Subjective:  Communicated with pt and CNA prior to session.  No new complaints; pt is stating, "I am ready to go home."    Pain/Comfort  Pain Rating 1: 0/10    Objective:        Functional Mobility:  Bed Mobility: mod I, slow speed       Transfers: CGA/SBA from bed to w/c        Functional Ambulation: see PT notes    Activities of Daily Living:     Feeding adaptive equipment: none needed     UE adaptive equipment: none     LE adaptive equipment: Reacher and Sock aid                     Bathing adaptive equipment: long-handled sponge and shower chair    Balance:   Static Sit: FAIR+: Able to take MINIMAL challenges from all directions  Dynamic Sit: FAIR+: Maintains balance through MINIMAL excursions of active trunk motion  Static Stand: FAIR+: Takes MINIMAL challenges from all directions  Dynamic stand: FAIR: Needs CONTACT GUARD during gait    Therapeutic Activities and Exercises:  To improve endurance, strength, OT facilitated pt with:  UBE x 10min with no RB's throughout, low level resist  GREEN PUTTY to simulate kitchen prep task and improve FM and     To improve reach, AROM, FM/ and trunk rotation with core forward leaning engagement:   seated Reciprocal pulleys x 8 min in sh flexion and abduction    ADL session to start OT tx today including dressing at bedside with setup and independence with UB, then CGA to min a with LB dressing, just from standing position to maintain balance while managing LB " "clothing.  Then setup at sinkside for grooming, with items handed to her and occasional cues for turning water off etc.      AM-PAC 6 CLICK ADL   How much help from another person does this patient currently need?   1 = Unable, Total/Dependent Assistance  2 = A lot, Maximum/Moderate Assistance  3 = A little, Minimum/Contact Guard/Supervision  4 = None, Modified [place]/Independent    Putting on and taking off regular lower body clothing? : 3  Bathing (including washing, rinsing, drying)?: 3  Toileting, which includes using toilet, bedpan, or urinal? : 3  Putting on and taking off regular upper body clothing?: 4  Taking care of personal grooming such as brushing teeth?: 4  Eating meals?: 4  Daily Activity Total Score: 21     AM-PAC Raw Score CMS "G-Code Modifier Level of Impairment Assistance   6 % Total / Unable   7 - 8 CM 80 - 100% Maximal Assist   9-13 CL 60 - 80% Moderate Assist   14 - 19 CK 40 - 60% Moderate Assist   20 - 22 CJ 20 - 40% Minimal Assist   23 CI 1-20% SBA / CGA   24 CH 0% Independent/ Mod I       Patient left up in chair with call button in reach    ASSESSMENT:  Tosin Cortez is a 89 y.o. female with a medical diagnosis of Frailty syndrome in geriatric patient and presents with no new complaints but reports she is "ready to go home.".    Rehab identified problem list/impairments:  weakness, impaired endurance, impaired self care skills, impaired functional mobility, gait instability, impaired balance, decreased lower extremity function, orthopedic precautions    Rehab potential is good.    Activity tolerance: Good    Discharge recommendations: home, home with home health, home health PT, home health OT   Barriers to discharge: Barriers to Discharge: None    Equipment recommendations: none    GOALS:   Multidisciplinary Problems       Occupational Therapy Goals          Problem: Occupational Therapy    Goal Priority Disciplines Outcome Interventions   Occupational Therapy Goal     OT, " PT/OT Ongoing, Progressing    Description: STG: within 2 weeks  Pt will perform grooming with independence at sinkside from seated SBA  Pt will bathe with setup and mod I sinkside SBA  Pt will perform UE dressing with setup and independently from bedside SBA  Pt will perform LE dressing with setup and mod I from bedside SBA/CGA  Pt will sit EOB x 30 min with no assistance MET  Pt will transfer bed/chair/bsc with mod I CURRENTLY SBA  Pt will perform standing task x 3 min with no assistance CURRENTLY CGA  Pt will tolerate 35 minutes of tx without fatigue MET      LT.Restore to max I with self care and mobility.                          Plan:  It is unclear at this time what environment pt will d/c to post tx here in this facility.  Family seeking nursing home placement.  Pt has been [place] wants to go home with her family.  TBD.  Patient to be seen 5 x/week to address the above listed problems via self-care/home management, therapeutic activities, therapeutic exercises, wheelchair management/training  Plan of Care expires: 23  Plan of Care reviewed with: patient         2023

## 2023-07-31 NOTE — PROGRESS NOTES
[facility]  Hospital Medicine  Progress Note    Patient Name: Tosin Cortez  MRN: [id number]  Patient Class: IP- Swing   Admission Date: 7/13/2023  Length of Stay: 18 days  Attending Physician: Won Plasencia DO  Primary Care Provider: Zechariah Camacho MD        Subjective:     Principal Problem:Frailty syndrome in geriatric patient        HPI:  Mrs. Tosin Cortez is a pleasant 89 y/o  female who has admitted to [facility] for skilled nursing services, PT/OT s/p surgical reduction of a right hip dislocation on 7/11/2023 by Dr. Pena of [facility]. She is doing well this am, No major pain.  Tolerating diet.       Overview/Hospital Course:  7/17 Seems to be doing well, UA was abnormal and started on Keflex.     7/21 Urine culture came back E. Coli, S to Cephalosporins.  Finished course.  She states she is real weak today and feels bad.  No elaboration though.  Just legs weak.     7/28 Doing well, still weak but she does participate with therapy     7/31 Doing better strength wise.  Asking about going home.       Interval History: seems more eager to go home.  Working with therapy     Review of Systems   Respiratory:  Negative for shortness of breath.    Cardiovascular:  Negative for chest pain.   Gastrointestinal:  Negative for abdominal pain, nausea and vomiting.   Psychiatric/Behavioral:  Negative for agitation and confusion.    All other systems reviewed and are negative.    Objective:     Vital Signs (Most Recent):  Temp: 97.7 °F (36.5 °C) (07/31/23 0802)  Pulse: 67 (07/31/23 0843)  Resp: 20 (07/31/23 0843)  BP: (!) 149/62 (07/31/23 0802)  SpO2: 95 % (07/31/23 0843) Vital Signs (24h Range):  Temp:  [97.7 °F (36.5 °C)-98.1 °F (36.7 °C)] 97.7 °F (36.5 °C)  Pulse:  [67-74] 67  Resp:  [20-22] 20  SpO2:  [95 %-98 %] 95 %  BP: (114-150)/(49-63) 149/62     Weight: 73.7 kg (162 lb 6.4 oz)  Body mass index is 27.02 kg/m².    Intake/Output Summary (Last 24  hours) at 7/31/2023 1151  Last data filed at 7/31/2023 0825  Gross per 24 hour   Intake 1475 ml   Output --   Net 1475 ml         Physical Exam  Vitals reviewed.   Constitutional:       General: She is not in acute distress.     Appearance: Normal appearance.   HENT:      Head: Normocephalic and atraumatic.   Eyes:      General: No scleral icterus.     Extraocular Movements: Extraocular movements intact.      Conjunctiva/sclera: Conjunctivae normal.      Pupils: Pupils are equal, round, and reactive to light.   Cardiovascular:      Rate and Rhythm: Normal rate and regular rhythm.      Heart sounds: No murmur heard.     No friction rub. No gallop.   Pulmonary:      Effort: Pulmonary effort is normal. No respiratory distress.      Breath sounds: Normal breath sounds. No wheezing or rales.   Abdominal:      General: Abdomen is flat. Bowel sounds are normal. There is no distension.      Palpations: Abdomen is soft.      Tenderness: There is no abdominal tenderness. There is no guarding.   Musculoskeletal:         General: No swelling.   Skin:     General: Skin is warm and dry.      Coloration: Skin is not jaundiced.      Findings: No rash.   Neurological:      General: No focal deficit present.      Mental Status: She is alert and oriented to person, place, and time.      Sensory: No sensory deficit.      Motor: Weakness present.   Psychiatric:         Mood and Affect: Mood normal.         Thought Content: Thought content normal.             Significant Labs: All pertinent labs within the past 24 hours have been reviewed.  Recent Lab Results       None            Significant Imaging: I have reviewed all pertinent imaging results/findings within the past 24 hours.      Assessment/Plan:      * Frailty syndrome in geriatric patient  PT/OT for safety       Hip dislocation, right, initial encounter  PT and OT for physical strengthening        Current mild episode of major depressive disorder without prior episode  Patient  has persistent depression which is mild and is currently controlled. Will Continue anti-depressant medications. We will not consult psychiatry at this time. Patient does not display psychosis at this time. Continue to monitor closely and adjust plan of care as needed.        Chronic low back pain without sciatica  Prn meds      HTN (hypertension)  Check home meds and resume. Numbers up at times.  May be situational.         VTE Risk Mitigation (From admission, onward)         Ordered     IP VTE HIGH RISK PATIENT  Once         07/13/23 1547     Place sequential compression device  Until discontinued         07/13/23 1547                Discharge Planning   FLACO:      Code Status: DNR   Is the patient medically ready for discharge?:     Reason for patient still in hospital (select all that apply): PT / OT recommendations  Discharge Plan A: Home with family                  Won Plasencia DO  Department of Hospital Medicine   [facility]

## 2023-08-01 NOTE — PLAN OF CARE
[facility]   Interdisciplinary Team Meeting    Patient: Tosin Cortez   Today's Date: 8/1/2023   Estimated D/C Date: TBD        Physician: Won Plasencia DO Nurse Practitioner: Aurea Rodriguez NP   Pharmacy: Mikey Erickson PharmD Unit Director: Zita Brown RN   : Liam Barry RN Physical/Occupational Therapy: Sigrid Parnell OT   Speech Therapy: ST Balaji Activity Therapy: Iris Amezcua   Nursing: Zita Brown RN  Respiratory: Steffany Cee,  Dietary: Shameka Olea RD  Other: Yana Gerard     Nursing  New Symptoms/Problems: n/a  Last Bowel Movement: 07/31/23  Urine: continent Diarrhea: No   Constipated: No Bowel: continent [place]: No   Isolation: No     O2 Device: Room Air O2 Flow: n/a  SpO2: 100 %   Nutrition: Regular  Speech/Swallowing: No current speech or swallowing issues  Aspiration Precautions: No  Cognition: WNL    Physical Therapy  Physical Therapy/Gait: ambulatory withRW ELOS: Plan to DC to nursing home   Transfers: Minimal Assistance Range of Motion/Restrictions: n/a     Occupational Therapy  Eating/Grooming: Contact Guard Assistance Toileting: Minimal Assistance   Bathing: Minimal Assistance Dressing (Upper Body): Minimal Assistance   Dressing (Lower Body): Minimal Assistance      Activity Therapy  Level of participation: Active participation      Tx Plan/Recommendations reviewed with family and/or patient on (date) 8/1.  Additional family Conference/Training: n/a  D/C Plan/Recommendations:  dc to nursing home   FLACO: TBD     Pharmacy  Medication Changes (see MD orders in chart): No  MD/NP: Won Plasencia DO Labs Reviewed: Yes New Lab Orders: No   Lab Concerns: n/a    I spoke with Joaquina at [facility], beds are available. States she will contact son to see when he will be available to sign papers/complete paperwork for long term care.

## 2023-08-01 NOTE — PT/OT/SLP PROGRESS
Physical Therapy Treatment    Patient Name:  Tosin Cortez   MRN:  [id number]    Recommendations:     Discharge Recommendations: intermediate care facility/nursing home  Discharge Equipment Recommendations: none  Barriers to discharge: Inaccessible home and Decreased caregiver support    Assessment:     Tosin Cortez is a 89 y.o. female admitted with a medical diagnosis of Frailty syndrome in geriatric patient.  She presents with the following impairments/functional limitations: impaired endurance, weakness, impaired self care skills, impaired functional mobility, gait instability, impaired balance.    Rehab Prognosis: Good; patient would benefit from acute skilled PT services to address these deficits and reach maximum level of function.    Recent Surgery: * No surgery found *      Plan:     During this hospitalization, patient to be seen 5 x/week to address the identified rehab impairments via gait training, therapeutic activities, therapeutic exercises, neuromuscular re-education and progress toward the following goals:    Plan of Care Expires:  08/04/23    Subjective     Chief Complaint: none  Patient/Family Comments/goals: Pt. States she is ready to go back home soon. States she feels like she can manage within her home.    Pain/Comfort:  Pain Rating 1: 0/10      Objective:     Communicated with Nursing  prior to session.  Patient found up in chair with oxygen upon PT entry to room.     General Precautions: Standard, fall, respiratory  Orthopedic Precautions: RLE anterior precautions, RLE weight bearing as tolerated  Braces: N/A  Respiratory Status: Nasal cannula, flow 2 L/min     Functional Mobility:  Transfers:     Sit to Stand:  minimum assistance with rolling walker  Gait: Pt. Amb. After LE PREs and rest x 8 feet with RW and CGA before requiring seated rest, then x 25 feet with RW and CGA to fatigue.        AM-PAC 6 CLICK MOBILITY  Turning over in bed (including adjusting bedclothes, sheets and blankets)?:  4  Sitting down on and standing up from a chair with arms (e.g., wheelchair, bedside commode, etc.): 3  Moving from lying on back to sitting on the side of the bed?: 3  Moving to and from a bed to a chair (including a wheelchair)?: 3  Need to walk in hospital room?: 3  Climbing 3-5 steps with a railing?: 1  Basic Mobility Total Score: 17       Treatment & Education:  Pt. Performed mobility per above and LE PREs in Sitting: Marching and  LAQs 2 x 10 reps each bilateral, Hell -toe raises 2 x 15 reps each, HS curls with Red TB 2 x 10 bilat, Hip ADD ball squeeze 2 x 15 and hip ABD/ ADD with knee ext x 10 reps each within limited ROM.     Patient left up in chair with all lines intact and call button in reach..    GOALS:   Multidisciplinary Problems       Physical Therapy Goals          Problem: Physical Therapy    Goal Priority Disciplines Outcome Goal Variances Interventions   Physical Therapy Goal     PT, PT/OT Ongoing, Progressing     Description: LTG - 3 weeks  1. Pt SBA with supine to/from sit using strap or cane to life RLE onto bed surface.-PROGRESSING  2. Pt will amb 150 ft with SBA using RW.-PROGRESSING  3. Pt will be SBA with transfers throughout.-PROGRESSING                         Time Tracking:     PT Received On: 08/01/23  PT Start Time: 1054     PT Stop Time: 1143  PT Total Time (min): 49 min     Billable Minutes: Gait Training 15 and Therapeutic Exercise 34    Treatment Type: Treatment  PT/PTA: PTA     Number of PTA visits since last PT visit: 3     08/01/2023

## 2023-08-01 NOTE — PT/OT/SLP PROGRESS
"Occupational Therapy   Treatment    Name: Tosin Cortez  MRN: [id number]  Admitting Diagnosis:  Frailty syndrome in geriatric patient       Recommendations:     Discharge Recommendations: home, home with home health, home health PT, home health OT  Discharge Equipment Recommendations:  none  Barriers to discharge:  None    Assessment:     Tosin Cortez is a 89 y.o. female with a medical diagnosis of Frailty syndrome in geriatric patient.  She presents with not wanting to do any more therapy, stating, 'I worked my arms this morning, too, already."  . Performance deficits affecting function are weakness, impaired endurance, impaired self care skills, impaired functional mobility, gait instability, impaired balance, decreased lower extremity function, orthopedic precautions.     Rehab Prognosis:  Fair; patient would benefit from acute skilled OT services to address these deficits and reach maximum level of function.       Plan:     Patient to be seen 5 x/week to address the above listed problems via self-care/home management, therapeutic activities, therapeutic exercises, wheelchair management/training  Plan of Care Expires: 08/04/23  Plan of Care Reviewed with: patient    Subjective     Chief Complaint: weakness  Patient/Family Comments/goals: return home to her house with her son  Pain/Comfort:       Objective:     Communicated with: pt and SB coordinator prior to session.  Patient found HOB elevated with  (no lines or drains) upon OT entry to room.    General Precautions: Standard, fall    Orthopedic Precautions:RLE anterior precautions  Braces: N/A  Respiratory Status: Nasal cannula, flow 2 L/min     Occupational Performance:     Bed Mobility:    Patient completed Rolling/Turning to Left with  modified independence  Patient completed Rolling/Turning to Right with modified independence  Patient completed Supine to Sit with modified independence  Patient completed Sit to Supine with modified independence , all with " "extra time and effort to complete    Functional Mobility/Transfers:  Patient completed Sit <> Stand Transfer with contact guard assistance  with  rolling walker   Patient completed Toilet Transfer Step Transfer technique with contact guard assistance with  rolling walker  Functional Mobility: see PT    Activities of Daily Living:  Toileting: minimum assistance to manage clothing getting back up into place after toileting      [facility] 6 Click ADL: 21    Treatment & Education:  Toileting as noted above.  Pt did not wish to participate in any further exercise today.  She also reports she plans to go home by the end of the week, "no matter what they say."    Patient left sitting edge of bed with call button in reach    GOALS:   Multidisciplinary Problems       Occupational Therapy Goals          Problem: Occupational Therapy    Goal Priority Disciplines Outcome Interventions   Occupational Therapy Goal     OT, PT/OT Ongoing, Progressing    Description: STG: within 2 weeks  Pt will perform grooming with independence at sinkside from seated SBA  Pt will bathe with setup and mod I sinkside SBA  Pt will perform UE dressing with setup and independently from bedside SBA  Pt will perform LE dressing with setup and mod I from bedside SBA/CGA  Pt will sit EOB x 30 min with no assistance MET  Pt will transfer bed/chair/bsc with mod I CURRENTLY SBA  Pt will perform standing task x 3 min with no assistance CURRENTLY CGA  Pt will tolerate 35 minutes of tx without fatigue MET      LT.Restore to max I with self care and mobility.                          Time Tracking:     OT Date of Treatment: 23  OT Start Time: 1450  OT Stop Time: 1506  OT Total Time (min): 16 min    Billable Minutes:Self Care/Home Management 16    OT/PATTI: OT          2023  "

## 2023-08-01 NOTE — PLAN OF CARE
Problem: Adult Inpatient Plan of Care  Goal: Patient-Specific Goal (Individualized)  Outcome: Ongoing, Progressing  Goal: Readiness for Transition of Care  Outcome: Ongoing, Progressing

## 2023-08-02 NOTE — PT/OT/SLP PROGRESS
Physical Therapy Treatment    Patient Name:  Tosin Cortez   MRN:  [id number]    Recommendations:     Discharge Recommendations: intermediate care facility/nursing home  Discharge Equipment Recommendations: none  Barriers to discharge: Inaccessible home    Assessment:     Tosin Cortez is a 89 y.o. female admitted with a medical diagnosis of Frailty syndrome in geriatric patient.  She presents with the following impairments/functional limitations: weakness, impaired endurance, impaired self care skills, impaired functional mobility, gait instability, impaired balance. Patient reported dizziness once when first standing prior to gait training, and once in between gait trials. Patient with no new complaints after completion of treatment.   Rehab Prognosis: Fair; patient would benefit from acute skilled PT services to address these deficits and reach maximum level of function.    Recent Surgery: * No surgery found *      Plan:     During this hospitalization, patient to be seen 5 x/week to address the identified rehab impairments via gait training, therapeutic activities, therapeutic exercises, neuromuscular re-education and progress toward the following goals:    Plan of Care Expires:  08/04/23    Subjective     Chief Complaint: Patient with stated that she felt nervous today, RN notified.   Patient/Family Comments/goals: Pt stated that she is ready to go home.   Pain/Comfort:  Pain Rating 1: 0/10      Objective:     Communicated with pt prior to session. Patient found up in chair upon PTA entry to room.    General Precautions: Standard, fall, respiratory  Orthopedic Precautions: RLE anterior precautions, RLE weight bearing as tolerated  Braces: N/A  Respiratory Status: Nasal cannula, flow 2 L/min     Functional Mobility:  Transfers:     Sit to Stand:  minimum assistance with rolling walker  Gait: Patient ambulated 23 feet + 17 feet with RW, CGA, 2L of O2, and WC trail.         AM-PAC 6 CLICK MOBILITY  Turning over in  bed (including adjusting bedclothes, sheets and blankets)?: 4  Sitting down on and standing up from a chair with arms (e.g., wheelchair, bedside commode, etc.): 3  Moving from lying on back to sitting on the side of the bed?: 3  Moving to and from a bed to a chair (including a wheelchair)?: 3  Need to walk in hospital room?: 3  Climbing 3-5 steps with a railing?: 1  Basic Mobility Total Score: 17       Treatment & Education:  Patient performed the following seated therapeutic exercises on BLE: LAQs 10x, Hip ADD squeezes 10x, heel/toe raises 10x, HS curls with red TB 10x    Patient left up in chair with call button in reach.    GOALS:   Multidisciplinary Problems       Physical Therapy Goals          Problem: Physical Therapy    Goal Priority Disciplines Outcome Goal Variances Interventions   Physical Therapy Goal     PT, PT/OT Ongoing, Progressing     Description: LTG - 3 weeks  1. Pt SBA with supine to/from sit using strap or cane to life RLE onto bed surface.-PROGRESSING  2. Pt will amb 150 ft with SBA using RW.-PROGRESSING  3. Pt will be SBA with transfers throughout.-PROGRESSING                         Time Tracking:     PT Received On: 08/02/23  PT Start Time: 1109     PT Stop Time: 1134  PT Total Time (min): 25 min     Billable Minutes: Gait Training 15 and Therapeutic Exercise 10    Treatment Type: Treatment  PT/PTA: PTA     Number of PTA visits since last PT visit: 4     08/02/2023

## 2023-08-02 NOTE — PT/OT/SLP PROGRESS
Occupational Therapy   Treatment    Name: Tosin Cortez  MRN: [id number]  Admitting Diagnosis:  Frailty syndrome in geriatric patient       Recommendations:     Discharge Recommendations: home, home with home health, home health PT, home health OT  Discharge Equipment Recommendations:  none  Barriers to discharge:  None    Assessment:     Tosin Cortez is a 89 y.o. female with a medical diagnosis of Frailty syndrome in geriatric patient.  She presents with no new px's, seems to feel better today. Performance deficits affecting function are weakness, impaired endurance, impaired self care skills, impaired functional mobility, gait instability, impaired balance, decreased lower extremity function, orthopedic precautions.     Rehab Prognosis:  Fair; patient would benefit from acute skilled OT services to address these deficits and reach maximum level of function.       Plan:     Patient to be seen 5 x/week to address the above listed problems via self-care/home management, therapeutic activities, therapeutic exercises, wheelchair management/training  Plan of Care Expires: 08/04/23  Plan of Care Reviewed with: patient    Subjective     Chief Complaint: weakness  Patient/Family Comments/goals: return home with family if possible; family is still seeking LTC placement per SB coordinator notes.  Pain/Comfort:       Objective:     Communicated with: pt prior to session.  Patient found up in chair with  (no lines or drains) upon OT entry to room.    General Precautions: Standard, fall    Orthopedic Precautions:RLE anterior precautions  Braces: N/A  Respiratory Status: Nasal cannula, flow 2 L/min     Occupational Performance:     Bed Mobility:    Na with OT today    Functional Mobility/Transfers:  See PT    Activities of Daily Living:  Na with OT today      [facility] 6 Click ADL:      Treatment & Education:  To improve endurance, strength, OT facilitated pt with:  UBE x 10 min with 1 to 2 RB's throughout, low level resist  GREEN  PUTTY to simulate kitchen prep task and improve FM and     To improve reach, AROM, FM/ and trunk rotation with core forward leaning engagement:   Towel scrubbing at tabletop with 3# weighted towel in multi directional pushes/pulls  seated Reciprocal pulleys x 5 min in sh flexion and abduction    Patient left up in chair with call button in reach    GOALS:   Multidisciplinary Problems       Occupational Therapy Goals          Problem: Occupational Therapy    Goal Priority Disciplines Outcome Interventions   Occupational Therapy Goal     OT, PT/OT Ongoing, Progressing    Description: STG: within 2 weeks  Pt will perform grooming with independence at sinkside from seated SBA  Pt will bathe with setup and mod I sinkside SBA  Pt will perform UE dressing with setup and independently from bedside SBA  Pt will perform LE dressing with setup and mod I from bedside SBA/CGA  Pt will sit EOB x 30 min with no assistance MET  Pt will transfer bed/chair/bsc with mod I CURRENTLY SBA  Pt will perform standing task x 3 min with no assistance CURRENTLY CGA  Pt will tolerate 35 minutes of tx without fatigue MET      LT.Restore to max I with self care and mobility.                          Time Tracking:     OT Date of Treatment: 23  OT Start Time: 1328  OT Stop Time: 1408  OT Total Time (min): 40 min    Billable Minutes:Therapeutic Activity 25  Therapeutic Exercise 15    OT/PATTI: OT          2023

## 2023-08-02 NOTE — PLAN OF CARE
I have spoken with patient's son, Gil. I again explained the importance of starting the paperwork for long term care. He states he would like for his mother to go from our facility to [facility]. I relayed the information regarding availability at [facility], he states he will call B[facility]

## 2023-08-03 NOTE — PT/OT/SLP PROGRESS
"Physical Therapy Treatment    Patient Name:  Tosin Cortez   MRN:  [id number]    Recommendations:     Discharge Recommendations: home health PT  Discharge Equipment Recommendations: none  Barriers to discharge: Inaccessible home and Decreased caregiver support    Assessment:     Tosin Cortez is a 89 y.o. female admitted with a medical diagnosis of Frailty syndrome in geriatric patient.  She presents with the following impairments/functional limitations: impaired endurance, gait instability, impaired balance, impaired cognition, decreased lower extremity function, impaired cardiopulmonary response to activity, orthopedic precautions .    Pt did not meet goals set due to dementia and respiratory issues. Progress was also compromised by UTI during her stay.    Rehab Prognosis: Fair; patient would benefit from acute skilled PT services to address these deficits and reach maximum level of function.    Recent Surgery: * No surgery found *      Plan:     During this hospitalization, patient to be seen 5 x/week to address the identified rehab impairments via gait training, therapeutic activities, therapeutic exercises and progress toward the following goals:    Plan of Care Expires:  08/04/23    Subjective     Chief Complaint: Pt states that her son, Akin, is "getting someone to stay with me".  Patient/Family Comments/goals: Pt to dc home tomorrow.  Pain/Comfort:  Pain Rating 1: 0/10      Objective:     Communicated with KAMLA Simmons prior to session.  Patient found up in chair with oxygen upon PT entry to room.     General Precautions: Standard, fall, respiratory  Orthopedic Precautions: LLE anterior precautions  Braces: N/A  Respiratory Status: Nasal cannula, flow 2 L/min     Functional Mobility:  Transfers:     Sit to Stand:  stand by assistance with rolling walker  Gait: Pt amb'd x 95 ft with SBA using RW with O2 and wc trail.      AM-PAC 6 CLICK MOBILITY  Turning over in bed (including adjusting bedclothes, sheets and " blankets)?: 4  Sitting down on and standing up from a chair with arms (e.g., wheelchair, bedside commode, etc.): 4  Moving from lying on back to sitting on the side of the bed?: 4  Moving to and from a bed to a chair (including a wheelchair)?: 3  Need to walk in hospital room?: 3  Climbing 3-5 steps with a railing?: 1  Basic Mobility Total Score: 19       Treatment & Education:  Nu-step at Level 2.0 x 6 minutes.SPO2 monitored while on 2L O2 and it stayed at 94-95% throughout treatment session.    Patient left up in chair with all lines intact..    GOALS:   Multidisciplinary Problems       Physical Therapy Goals          Problem: Physical Therapy    Goal Priority Disciplines Outcome Goal Variances Interventions   Physical Therapy Goal     PT, PT/OT Ongoing, Progressing     Description: LTG - 3 weeks  1. Pt SBA with supine to/from sit using strap or cane to life RLE onto bed surface.-PROGRESSING  2. Pt will amb 150 ft with SBA using RW.-PROGRESSING  3. Pt will be SBA with transfers throughout.-PROGRESSING                         Time Tracking:     PT Received On: 08/03/23  PT Start Time: 1438     PT Stop Time: 1502  PT Total Time (min): 24 min     Billable Minutes: Gait Training 11, Therapeutic Activity 5, Therapeutic Exercise 8, and Total Time 24 min    Treatment Type: Treatment  PT/PTA: PT     Number of PTA visits since last PT visit: 0     08/03/2023

## 2023-08-03 NOTE — PT/OT/SLP DISCHARGE
Occupational Therapy Discharge Summary    Tosin Cortez  MRN: [id number]   Principal Problem: Frailty syndrome in geriatric patient       Patient Discharged from acute Occupational Therapy on 8/3/2023.  Please refer to prior OT note dated 8/3/2023 for functional status.    Assessment:      Patient is no longer making progress. Patient appropriate for care in another setting.    Objective:     GOALS:   Multidisciplinary Problems       Occupational Therapy Goals          Problem: Occupational Therapy    Goal Priority Disciplines Outcome Interventions   Occupational Therapy Goal     OT, PT/OT Adequate for Care Transition    Description: STG: within 2 weeks  Pt will perform grooming with independence at sinkside from seated SBA CURRENTLY AT MAX POTENTIAL  Pt will bathe with setup and mod I sinkside CURRENTLY AT MAX POTENTIAL of SBA  Pt will perform UE dressing with setup and independently from bedside CURRENTLY AT MAX POTENTIAL of SBA  Pt will perform LE dressing with setup and mod I from bedside CURRENTLY AT MAX POTENTIAL of SBA/CGA  Pt will sit EOB x 30 min with no assistance MET  Pt will transfer bed/chair/bsc with mod I CURRENTLY AT MAX POTENTIAL of SBA  Pt will perform standing task x 3 min with no assistance CURRENTLY CGA  Pt will tolerate 35 minutes of tx without fatigue MET      LT.Restore to max I with self care and mobility.                          Reasons for Discontinuation of Therapy Services  Transfer to alternate level of care.  Pt son wants her to go home to her house with him tomorrow with home health    Plan:     Patient Discharged to: Home with Home Health Service and after d/c tomorrow from this facility to home with family    8/3/2023

## 2023-08-03 NOTE — PLAN OF CARE
Problem: Occupational Therapy  Goal: Occupational Therapy Goal  Description: STG: within 2 weeks  Pt will perform grooming with independence at sinkside from seated SBA CURRENTLY AT MAX POTENTIAL  Pt will bathe with setup and mod I sinkside CURRENTLY AT MAX POTENTIAL of SBA  Pt will perform UE dressing with setup and independently from bedside CURRENTLY AT MAX POTENTIAL of SBA  Pt will perform LE dressing with setup and mod I from bedside CURRENTLY AT MAX POTENTIAL of SBA/CGA  Pt will sit EOB x 30 min with no assistance MET  Pt will transfer bed/chair/bsc with mod I CURRENTLY AT MAX POTENTIAL of SBA  Pt will perform standing task x 3 min with no assistance CURRENTLY [facility]  Pt will tolerate 35 minutes of tx without fatigue MET      LT.Restore to max I with self care and mobility.     Outcome: Adequate for Care Transition

## 2023-08-03 NOTE — PLAN OF CARE
[facility] - Medical Surgical Unit  Discharge Final Note    Primary Care Provider: Zechariah Camacho MD    Expected Discharge Date: 8/4/2023    Final Discharge Note (most recent)       Final Note - 08/03/23 1046          Final Note    Assessment Type Final Discharge Note     Anticipated Discharge Disposition [facility]-Health [facility]     What phone number can be called within the next 1-3 days to see how you are doing after discharge? [phone]        Post-Acute Status    Post-Acute Authorization Home Health     Home Health Status Referrals Sent     Discharge Delays None known at this time                     Important Message from Medicare        Date IMM was signed: 08/03/23  Time IMM was signed: 1024

## 2023-08-03 NOTE — PLAN OF CARE
Gil states that he would like patient to dc home tomorrow with home health. She has received services from [facility] previously and would like to have them again.

## 2023-08-04 PROBLEM — G89.29 CHRONIC LOW BACK PAIN WITHOUT SCIATICA: Status: RESOLVED | Noted: 2021-10-25 | Resolved: 2023-01-01

## 2023-08-04 PROBLEM — M54.50 CHRONIC LOW BACK PAIN WITHOUT SCIATICA: Status: RESOLVED | Noted: 2021-10-25 | Resolved: 2023-01-01

## 2023-08-04 PROBLEM — S73.004A HIP DISLOCATION, RIGHT, INITIAL ENCOUNTER: Status: RESOLVED | Noted: 2023-01-01 | Resolved: 2023-01-01

## 2023-08-04 NOTE — PLAN OF CARE
Problem: Adult Inpatient Plan of Care  Goal: Patient-Specific Goal (Individualized)  8/4/2023 1347 by Meche Canada RN  Outcome: Met  8/4/2023 1346 by Meche Canada RN  Outcome: Adequate for Care Transition  8/4/2023 0744 by Meche Canada RN  Outcome: Ongoing, Progressing  Goal: Absence of Hospital-Acquired Illness or Injury  8/4/2023 1347 by Meche Canada RN  Outcome: Met  8/4/2023 1346 by Meche Canada RN  Outcome: Adequate for Care Transition  8/4/2023 0744 by Meche Canada RN  Outcome: Ongoing, Progressing  Goal: Optimal Comfort and Wellbeing  8/4/2023 1347 by Meche Canada RN  Outcome: Met  8/4/2023 1346 by Meche Canada RN  Outcome: Adequate for Care Transition  8/4/2023 0744 by Meche Canada RN  Outcome: Ongoing, Progressing  Goal: Readiness for Transition of Care  8/4/2023 1347 by Meche Canada RN  Outcome: Met  8/4/2023 1346 by Meche Canada RN  Outcome: Adequate for Care Transition  8/4/2023 0744 by Meche Canada RN  Outcome: Ongoing, Progressing     Problem: Pain Acute  Goal: Acceptable Pain Control and Functional Ability  8/4/2023 1347 by Meche Canada RN  Outcome: Met  8/4/2023 1346 by Meche Canada RN  Outcome: Adequate for Care Transition  8/4/2023 0744 by Meche Canada RN  Outcome: Ongoing, Progressing     Problem: Fall Injury Risk  Goal: Absence of Fall and Fall-Related Injury  8/4/2023 1347 by Meche Canada, JESSICA  Outcome: Met  8/4/2023 1346 by Meche Canada RN  Outcome: Adequate for Care Transition  8/4/2023 0744 by Meche Canada RN  Outcome: Ongoing, Progressing     Problem: Skin Injury Risk Increased  Goal: Skin Health and Integrity  8/4/2023 1347 by Meche Canada RN  Outcome: Met  8/4/2023 1346 by Meche Canada RN  Outcome: Adequate for Care Transition  8/4/2023 0744 by Meche Canada RN  Outcome: Ongoing, Progressing     Problem: Breathing Pattern Ineffective  Goal: Effective Breathing Pattern  8/4/2023 1347 by Meche Canada, RN  Outcome: Met  8/4/2023 1346 by Meche Canada, RN  Outcome: Adequate  for Care Transition     Problem: Gas Exchange Impaired  Goal: Optimal Gas Exchange  2023 1347 by Meche Canada, RN  Outcome: Met  2023 1346 by Meche Canada, RN  Outcome: Adequate for Care Transition     Problem: Occupational Therapy  Goal: Occupational Therapy Goal  Description: STG: within 2 weeks  Pt will perform grooming with independence at sinkside from seated SBA CURRENTLY AT MAX POTENTIAL  Pt will bathe with setup and mod I sinkside CURRENTLY AT MAX POTENTIAL of SBA  Pt will perform UE dressing with setup and independently from bedside CURRENTLY AT MAX POTENTIAL of SBA  Pt will perform LE dressing with setup and mod I from bedside CURRENTLY AT MAX POTENTIAL of SBA/CGA  Pt will sit EOB x 30 min with no assistance MET  Pt will transfer bed/chair/bsc with mod I CURRENTLY AT MAX POTENTIAL of SBA  Pt will perform standing task x 3 min with no assistance CURRENTLY CGA  Pt will tolerate 35 minutes of tx without fatigue MET      LT.Restore to max I with self care and mobility.     Outcome: Met     Problem: Physical Therapy  Goal: Physical Therapy Goal  Description: LTG - 3 weeks  1. Pt SBA with supine to/from sit using strap or cane to life RLE onto bed surface.-PROGRESSING  2. Pt will amb 150 ft with SBA using RW.-PROGRESSING  3. Pt will be SBA with transfers throughout.-PROGRESSING    Outcome: Met     Problem: Infection  Goal: Absence of Infection Signs and Symptoms  2023 1347 by Meche Canada, RN  Outcome: Met  2023 1346 by Meche Canada, RN  Outcome: Adequate for Care Transition  2023 0744 by Meche Canada, RN  Outcome: Ongoing, Progressing

## 2023-08-07 NOTE — PROGRESS NOTES
C3 nurse spoke with Tosin Cortez' son  for a TCC post hospital discharge follow up call. The patient has a scheduled [facility] appointment with Zechariah Camacho MD  on 08/11/2023  @ 1000.

## 2023-08-07 NOTE — PROGRESS NOTES
Rec'd voicemail from patient's son, Justo, regarding prescription for Valsartan.  Patient has refills at the pharmacy according to medication list.  Contacted [facility]'s pharmacy states they are filling the medication now and will be ready for  this afternoon.    Called Justo with no answer.  Left voicemail that valsartan would be ready for  this afternoon.

## 2023-08-08 NOTE — PROGRESS NOTES
Rec'd voicemail from patient's son regarding home health.  Returned  call and discussed with him my conversation with home health on 8/7/23.   Maranda with [facility] said the patient would be seen by their nurse today 8/8/23.   Explained to Mr. Cortez if the nurse with [facility] does not come today to call [facility] or call me back and [facility] would [facility].   Voiced understanding.

## 2023-08-10 PROBLEM — I70.0 AORTIC ATHEROSCLEROSIS: Status: ACTIVE | Noted: 2023-01-01

## 2023-08-10 PROBLEM — D69.6 THROMBOCYTOPENIA, UNSPECIFIED: Status: ACTIVE | Noted: 2023-01-01

## 2023-08-10 PROBLEM — Z09 HOSPITAL DISCHARGE FOLLOW-UP: Status: ACTIVE | Noted: 2023-01-01

## 2023-08-10 NOTE — PROGRESS NOTES
Subjective     Patient ID: Tosin Cortez is a 89 y.o. female.    Chief Complaint: recent hospitalization (At [facility] for R hip displacement and swing bed at [facility]) and Follow-up    HPI    89-year-old woman with the medical problems listed below who comes to clinic for hospital follow-up. She was in the hospital from 7/13 - 8/4.    Initially required surgical reduction of a right hip dislocation on 7/11/23 by Dr. Pena at [facility], then transferred to [facility] subacute rehab.     Subacute rehab hospital course:    7/17 Seems to be doing well, UA was abnormal and started on Keflex.      7/21 Urine culture came back E. Coli, S to Cephalosporins.  Finished course.  She states she is real weak today and feels bad.  No elaboration though.  Just legs weak.      7/28 Doing well, still weak but she does participate with therapy      7/31 Doing better strength wise.  Asking about going home.      8/4 DC home, she has been on O2.  Checked RA sats and were low.  But when she coughed up secretions and took deep breath she came up 94%.  Will send home with wilma.  Son says he has a nebulizer.  She has no hx of COPD but may need a workup at some point.        She has been doing fairly well since her discharge home, but she has been weak and unsteady. She has not started physical therapy at home yet, but she is using her walker to get around the house. Appetite has been ok, but she is not supplementing her diet with Ensure regularly. Review of dietician records indicate she was eating about 50-75% of her meals.    Her main concern is insomnia. She has trouble falling asleep due to anxiety. She has been taking trazodone prescribed by her psychiatrist, but this has not worked very well for her recently. Her son accompanies her and has a bottle of melatonin in her medication bag; she has not been taking this regularly. She has a history of sleep walking.     She continues on both clopidogrel and aspirin for secondary stroke  "prevention. She will be seeing her neurologist, Dr. Miranda, next month for a follow-up appointment.     The home health nurses were concerned about low pulse oximetry readings at the house and requesting we place an order for home oxygen. At rest her SpO2 is in the low-mid 90's. She does not note any problems with dyspnea when she is walking or complain of being out of breath. She does still have a productive cough for which mucinex has been helping. Dr. Plasencia from [facility] also sent her home with dulionel. She does have an incentive spirometer but is not using it very much. She appears comfortable on exam today.       Her discharge medication list was reviewed:    ASA 81 mg daily  Atorva 80 mg qhs  Clopidogrel 75 daily    Colesevelam 625 mg BID with meals  Famotidine 20 mg dily    Duonebs q6h prn  Duaifenesin 600 mg BID    Mag ox 250 daily  Metop tartrate 25 BID  Valsartan 80 mg daily     Trazodone 100 mg qhs        Objective     BP (!) 183/81 (BP Location: Left arm, Patient Position: Sitting, BP Method: Medium (Automatic))   Pulse 69   Temp 98.1 °F (36.7 °C) (Oral)   Ht 5' 5" (1.651 m)   Wt 66.7 kg (147 lb)   SpO2 (!) 93%   BMI 24.46 kg/m²     Wt Readings from Last 3 Encounters:   08/10/23 0926 66.7 kg (147 lb)   08/03/23 0607 74.1 kg (163 lb 6.4 oz)   07/27/23 0603 73.7 kg (162 lb 6.4 oz)   07/20/23 0500 76 kg (167 lb 8 oz)   07/13/23 1213 75.8 kg (167 lb 1.6 oz)   04/11/23 0832 76.2 kg (168 lb)         Physical Exam    Gen: NAD, well-groomed, well-dressed   HEENT: no conjunctival pallor or scleral icterus; mucous membranes moist  CV: normal rate, regular rhythm; soft systolic murmur present  Pulm: CTAB; normal work of breathing  Abdomen: NABS, soft, non tender/non-distended; no hepatosplenomegaly appreciated  Extremities: warm, well-perfused; no peripheral edema  MSK: normal bulk and tone   Skin: warm and dry; no suspicious lesions  Neuro: CN II-XII grossly normal; awake and alert; walking with walker "   Psych: pleasant affect; judgment/insight intact       Assessment and Plan     Problem List Items Addressed This Visit       Major neurocognitive disorder, due to vascular disease, without behavioral disturbance, mild (Chronic)     Not on medication; unclear hx, will need to clarify this diagnosis at next visit.          Other insomnia     Melatonin was previously helping; refill today, encourage adherence.          Relevant Medications    melatonin 5 mg Cap    Hyperlipemia     Continue atorvastatin; Unclear what the indication for colesevelam was - diarrhea, elevated LDL-C? Will stop this as it can impair the absorption of other drugs and fat soluble vitamins.         Relevant Orders    Lipid Panel    Vitamin D deficiency     Need to recheck, given the above;         Relevant Orders    Vitamin D    Weight loss     Has lost a substantial amount of weight - likely due to comorbid mood problems, decreased appetite. Have encourage a meal supplement. Will continue to monitor         Relevant Orders    CBC Auto Differential    Comprehensive Metabolic Panel    Paresthesia of right upper and lower extremity     Stable; seeing Dr. Miranda; continue magnesium; recheck levels today.         Relevant Orders    Magnesium    Frailty syndrome in geriatric patient     Was previously very active physically; continue working with PT at home, good nutritional support. Will check labs and try to optimize her nutrition as necessary         Relevant Orders    CBC Auto Differential    Comprehensive Metabolic Panel    Thrombocytopenia, unspecified    Aortic atherosclerosis     Stable; asymptomatic; follows with cards. continue to monitor           Hospital discharge follow-up - Primary     Stable / improving. home health assisting.           Other Visit Diagnoses       Cerebrovascular accident (CVA) due to stenosis of left posterior cerebral artery        Relevant Medications    clopidogreL (PLAVIX) 75 mg tablet    Cough, unspecified type         Relevant Medications    dextromethorphan-guaiFENesin 60-1,200 mg per 12 hr tablet

## 2023-08-10 NOTE — ASSESSMENT & PLAN NOTE
Was previously very active physically; continue working with PT at home, good nutritional support. Will check labs and try to optimize her nutrition as necessary

## 2023-08-10 NOTE — ASSESSMENT & PLAN NOTE
Has lost a substantial amount of weight - likely due to comorbid mood problems, decreased appetite. Have encourage a meal supplement. Will continue to monitor

## 2023-08-10 NOTE — ASSESSMENT & PLAN NOTE
Continue atorvastatin; Unclear what the indication for colesevelam was - diarrhea, elevated LDL-C? Will stop this as it can impair the absorption of other drugs and fat soluble vitamins.

## 2023-08-10 NOTE — PATIENT INSTRUCTIONS
- Start taking melatonin, 1-2 tabs nightly as needed for sleep   - Talk to neurologist about stopping clopidogrel (Plavix) and just taking the baby aspirin for stroke prevention  - Increase your use of the incentive spirometer device. You should be doing 2 sets of 10 repetitions, 4-5 times a day.

## 2023-09-09 DIAGNOSIS — Z71.89 COMPLEX CARE COORDINATION: ICD-10-CM

## 2023-11-13 PROBLEM — Z09 HOSPITAL DISCHARGE FOLLOW-UP: Status: RESOLVED | Noted: 2023-01-01 | Resolved: 2023-11-13

## 2023-12-27 ENCOUNTER — DOCUMENTATION ONLY (OUTPATIENT)
Dept: FAMILY MEDICINE | Facility: CLINIC | Age: 88
End: 2023-12-27
Payer: MEDICARE